# Patient Record
Sex: FEMALE | Race: WHITE | NOT HISPANIC OR LATINO | Employment: OTHER | URBAN - METROPOLITAN AREA
[De-identification: names, ages, dates, MRNs, and addresses within clinical notes are randomized per-mention and may not be internally consistent; named-entity substitution may affect disease eponyms.]

---

## 2017-01-05 ENCOUNTER — OFFICE VISIT (OUTPATIENT)
Dept: INTERNAL MEDICINE | Facility: CLINIC | Age: 82
End: 2017-01-05
Payer: MEDICARE

## 2017-01-05 VITALS
SYSTOLIC BLOOD PRESSURE: 124 MMHG | HEART RATE: 73 BPM | HEIGHT: 62 IN | OXYGEN SATURATION: 98 % | RESPIRATION RATE: 18 BRPM | BODY MASS INDEX: 28.16 KG/M2 | WEIGHT: 153 LBS | TEMPERATURE: 99 F | DIASTOLIC BLOOD PRESSURE: 78 MMHG

## 2017-01-05 DIAGNOSIS — J44.9 CHRONIC OBSTRUCTIVE PULMONARY DISEASE, UNSPECIFIED COPD TYPE: ICD-10-CM

## 2017-01-05 PROCEDURE — 99213 OFFICE O/P EST LOW 20 MIN: CPT | Mod: PBBFAC,PN | Performed by: INTERNAL MEDICINE

## 2017-01-05 PROCEDURE — 99999 PR PBB SHADOW E&M-EST. PATIENT-LVL III: CPT | Mod: PBBFAC,,, | Performed by: INTERNAL MEDICINE

## 2017-01-05 PROCEDURE — 99213 OFFICE O/P EST LOW 20 MIN: CPT | Mod: S$PBB,,, | Performed by: INTERNAL MEDICINE

## 2017-01-05 NOTE — MR AVS SNAPSHOT
[facility]  [address] [place] [address] [address]  [place] [address]  Phone: [phone]                  Carmela Maria   2017 9:00 AM   Office Visit    Description:  Female : 1926   Provider:  Aden Jones MD   Department:  [facility]           Reason for Visit     Follow-up           Diagnoses this Visit        Comments    Chronic obstructive pulmonary disease, unspecified COPD type                To Do List           Future Appointments        Provider Department Dept Phone    2017 10:00 AM [facility] BMD1 [facility] [phone]    2017 10:30 AM Santosh Narayanan MD [facility] Rheumatology [phone]    2017 10:00 AM Aden Jones MD [facility] [phone]    2017 10:00 AM [facility] XR2 [facility] [phone]    2017 10:20 AM PULMONARY LAB, [facility]- Pulmonary Function Svcs [phone]      Goals (5 Years of Data)     None      Follow-Up and Disposition     Return in about 7 months (around 2017), or if symptoms worsen or fail to improve, for Annual Wellness Visit.    Follow-up and Disposition History      [facility]     [facility] -  Assistance  Registered nurses in the [facility] provide clinical advisement, health education, appointment booking, and other advisory services.  Call for this free service at [phone].             Medications           Message regarding Medications     Verify the changes and/or additions to your medication regime listed below are the same as discussed with your clinician today.  If any of these changes or additions are incorrect, please notify your healthcare provider.        STOP taking these medications     albuterol-ipratropium 2.5mg-0.5mg/3mL (DUO-NEB) 0.5 mg-3 mg(2.5 mg base)/3 mL nebulizer solution Take 3 mLs by nebulization every 6 (six) hours while awake.           Verify that the below list of  "medications is an accurate representation of the medications you are currently taking.  If none reported, the list may be blank. If incorrect, please contact your healthcare provider. Carry this list with you in case of emergency.           Current Medications     betamethasone dipropionate (DIPROLENE) 0.05 % cream Apply topically 2 (two) times daily.    betamethasone dipropionate (DIPROLENE) 0.05 % cream Apply topically 2 (two) times daily.    budesonide (PULMICORT) 0.5 mg/2 mL nebulizer solution Take 2 mLs (0.5 mg total) by nebulization 2 (two) times daily.    calcium carbonate (OS-KASIA) 600 mg (1,500 mg) Tab Take 600 mg by mouth once daily.    cholecalciferol, vitamin D3, (VITAMIN D3) 400 unit Chew Take by mouth. Take  tues - thurs - sat    dasatinib (SPRYCEL) 50 MG tablet Take 1 tablet (50 mg total) by mouth every other day.    docusate sodium (COLACE) 100 MG capsule Take by mouth 2 (two) times daily.    furosemide (LASIX) 20 MG tablet Take 1 tablet (20 mg total) by mouth 2 (two) times daily as needed (leg swelling).    metoprolol succinate (TOPROL-XL) 25 MG 24 hr tablet Take 0.5 tablets (12.5 mg total) by mouth every evening.    ranitidine (ZANTAC) 150 MG tablet Take 150 mg by mouth 2 (two) times daily.    ranolazine (RANEXA) 500 MG Tb12 Take 1 tablet (500 mg total) by mouth 2 (two) times daily.    trazodone (DESYREL) 50 MG tablet Take 0.5 tablets (25 mg total) by mouth every evening.           Clinical Reference Information           Vital Signs - Last Recorded  Most recent update: 1/5/2017  9:07 AM by Mag Yu MA    BP Pulse Temp Resp Ht Wt    124/78 (BP Location: Right arm, Patient Position: Sitting, BP Method: Manual) 73 98.6 °F (37 °C) (Tympanic) 18 5' 2" (1.575 m) 69.4 kg (153 lb)    SpO2 BMI             98% 27.98 kg/m2         Blood Pressure          Most Recent Value    BP  124/78      Allergies as of 1/5/2017     Aspirin    Asmanex Twisthaler  [Mometasone]    Brovana  [Arformoterol]    Butisol  " [Butabarbital]    Codeine    Diazepam    Iodine    Limbitrol  [Amitriptyline-chlordiazepoxide]    Medrol  [Methylprednisolone]    Sulfa (Sulfonamide Antibiotics)      Immunizations Administered on Date of Encounter - 1/5/2017     None

## 2017-01-05 NOTE — PROGRESS NOTES
Subjective:      Patient ID: Carmela Maria is a 89 y.o. female.    Chief Complaint: Follow-up    HPI  Ms. Maria is a patient of mine, who presents for FU on leg swelling. She reports resolution of her bilateral leg swelling following taking lasix 20 mg on 2 episodes. No sob. No cp.     She reports she stopped taking her DUO-NEB 1 month ago due making her feel nervous, fatigue and sob.    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 Unruly Ball    CHF (congestive heart failure)     Clotting disorder     COPD (chronic obstructive pulmonary disease)     Coronary artery disease     CVA (cerebral infarction) 2013    Depression     Fall 2013    Heart murmur     Hyperlipidemia     Osteoporosis     Palpitation 2015    Sleep apnea     Traumatic brain injury 2013    Ulcer      Past Surgical History   Procedure Laterality Date    Appendectomy       section      Tubal ligation       Social History     Social History    Marital status:      Spouse name: N/A    Number of children: N/A    Years of education: N/A     Occupational History    Not on file.     Social History Main Topics    Smoking status: Never Smoker    Smokeless tobacco: Never Used    Alcohol use No    Drug use: No    Sexual activity: No     Other Topics Concern    Not on file     Social History Narrative     Family History   Problem Relation Age of Onset    COPD Mother     Heart disease Father     Cancer Sister     COPD Sister     COPD Brother        Current Outpatient Prescriptions:     betamethasone dipropionate (DIPROLENE) 0.05 % cream, Apply topically 2 (two) times daily., Disp: 60 g, Rfl: 1    betamethasone dipropionate (DIPROLENE) 0.05 % cream, Apply topically 2 (two) times daily., Disp: 60 g, Rfl: 1    budesonide (PULMICORT) 0.5 mg/2 mL nebulizer  solution, Take 2 mLs (0.5 mg total) by nebulization 2 (two) times daily., Disp: 360 mL, Rfl: 3    calcium carbonate (OS-KASIA) 600 mg (1,500 mg) Tab, Take 600 mg by mouth once daily., Disp: , Rfl:     cholecalciferol, vitamin D3, (VITAMIN D3) 400 unit Chew, Take by mouth. Take  tues - thurs - sat, Disp: , Rfl:     dasatinib (SPRYCEL) 50 MG tablet, Take 1 tablet (50 mg total) by mouth every other day., Disp: , Rfl:     docusate sodium (COLACE) 100 MG capsule, Take by mouth 2 (two) times daily., Disp: , Rfl:     furosemide (LASIX) 20 MG tablet, Take 1 tablet (20 mg total) by mouth 2 (two) times daily as needed (leg swelling)., Disp: 60 tablet, Rfl: 11    metoprolol succinate (TOPROL-XL) 25 MG 24 hr tablet, Take 0.5 tablets (12.5 mg total) by mouth every evening., Disp: 30 tablet, Rfl: 11    ranitidine (ZANTAC) 150 MG tablet, Take 150 mg by mouth 2 (two) times daily., Disp: , Rfl:     ranolazine (RANEXA) 500 MG Tb12, Take 1 tablet (500 mg total) by mouth 2 (two) times daily., Disp: 180 tablet, Rfl: 2    trazodone (DESYREL) 50 MG tablet, Take 0.5 tablets (25 mg total) by mouth every evening., Disp: 15 tablet, Rfl: 11    Review of patient's allergies indicates:   Allergen Reactions    Aspirin      Other reaction(s): Unknown    Asmanex twisthaler  [mometasone]      Other reaction(s): Rhinitis  Other reaction(s): Headache  Other reaction(s): Eye irritation    Brovana  [arformoterol] Nausea Only     Other reaction(s): Headache  Other reaction(s): Dry Nose    Butisol  [butabarbital]      Other reaction(s): Unknown    Codeine      Other reaction(s): Unknown    Diazepam      Other reaction(s): Unknown    Iodine      Other reaction(s): Unknown    Limbitrol  [amitriptyline-chlordiazepoxide]      Other reaction(s): Unknown  Other reaction(s): Unknown    Medrol  [methylprednisolone]      Other reaction(s): Shortness of breath  Other reaction(s): Shortness of breath    Sulfa (sulfonamide antibiotics)      Other  "reaction(s): Unknown     Lab Results   Component Value Date    WBC 5.63 09/09/2015    HGB 11.6 (L) 09/09/2015    HCT 34.7 (L) 09/09/2015     (L) 09/09/2015    CHOL 169 11/10/2015    TRIG 119 11/10/2015    HDL 55 11/10/2015    ALT 8 (L) 09/21/2016    AST 18 09/21/2016     09/21/2016     09/21/2016    K 4.9 09/21/2016    K 4.9 09/21/2016     09/21/2016     09/21/2016    CREATININE 0.9 09/21/2016    CREATININE 0.9 09/21/2016    BUN 23 09/21/2016    BUN 23 09/21/2016    CO2 34 (H) 09/21/2016    CO2 34 (H) 09/21/2016    TSH 1.771 12/08/2016    INR 1.0 06/07/2013    GLUF 103 05/03/2006     Visit Vitals    /78 (BP Location: Right arm, Patient Position: Sitting, BP Method: Manual)    Pulse 73    Temp 98.6 °F (37 °C) (Tympanic)    Resp 18    Ht 5' 2" (1.575 m)    Wt 69.4 kg (153 lb)    SpO2 98%    BMI 27.98 kg/m2     Review of Systems   Negative    Objective:     Physical Exam  GEN: A&O x3, NAD  PSYC: Normal affect  EXT: No C/C/E    No results found for: HGBA1C    Assessment/Plan:   1. Peripheral edema: Resolved.  2. SOB: Resolved.     RTC in 7 months for annual wellness visit or sooner as needed  "

## 2017-02-04 DIAGNOSIS — R00.2 PALPITATION: ICD-10-CM

## 2017-02-04 DIAGNOSIS — I35.0 NONRHEUMATIC AORTIC VALVE STENOSIS: ICD-10-CM

## 2017-02-04 RX ORDER — METOPROLOL SUCCINATE 25 MG/1
TABLET, EXTENDED RELEASE ORAL
Qty: 30 TABLET | Refills: 10 | Status: SHIPPED | OUTPATIENT
Start: 2017-02-04 | End: 2017-03-30 | Stop reason: SDUPTHER

## 2017-03-06 ENCOUNTER — TELEPHONE (OUTPATIENT)
Dept: RHEUMATOLOGY | Facility: HOSPITAL | Age: 82
End: 2017-03-06

## 2017-03-06 DIAGNOSIS — M81.0 OSTEOPOROSIS: Primary | ICD-10-CM

## 2017-03-06 RX ORDER — ZOLEDRONIC ACID 5 MG/100ML
5 INJECTION, SOLUTION INTRAVENOUS ONCE
Status: CANCELLED | OUTPATIENT
Start: 2017-03-06 | End: 2017-03-06

## 2017-03-06 RX ORDER — SODIUM CHLORIDE 0.9 % (FLUSH) 0.9 %
10 SYRINGE (ML) INJECTION
Status: CANCELLED | OUTPATIENT
Start: 2017-03-06

## 2017-03-21 DIAGNOSIS — M85.80 OSTEOPENIA OF THE ELDERLY: Primary | ICD-10-CM

## 2017-03-21 RX ORDER — SODIUM CHLORIDE 0.9 % (FLUSH) 0.9 %
10 SYRINGE (ML) INJECTION
Status: CANCELLED | OUTPATIENT
Start: 2017-03-21

## 2017-03-21 RX ORDER — ZOLEDRONIC ACID 5 MG/100ML
5 INJECTION, SOLUTION INTRAVENOUS ONCE
Status: CANCELLED | OUTPATIENT
Start: 2017-03-21 | End: 2017-03-21

## 2017-03-30 DIAGNOSIS — R00.2 PALPITATION: ICD-10-CM

## 2017-03-30 DIAGNOSIS — I35.0 NONRHEUMATIC AORTIC VALVE STENOSIS: ICD-10-CM

## 2017-03-30 DIAGNOSIS — J44.9 CHRONIC OBSTRUCTIVE PULMONARY DISEASE, UNSPECIFIED COPD TYPE: ICD-10-CM

## 2017-03-30 DIAGNOSIS — R00.2 PALPITATION: Primary | ICD-10-CM

## 2017-03-30 RX ORDER — BUDESONIDE 0.5 MG/2ML
0.5 INHALANT ORAL 2 TIMES DAILY
Qty: 360 ML | Refills: 3 | Status: SHIPPED | OUTPATIENT
Start: 2017-03-30 | End: 2017-08-17 | Stop reason: SDUPTHER

## 2017-03-30 RX ORDER — BUDESONIDE 0.5 MG/2ML
0.5 INHALANT ORAL 2 TIMES DAILY
Qty: 360 ML | Refills: 3 | Status: SHIPPED | OUTPATIENT
Start: 2017-03-30 | End: 2017-03-30

## 2017-03-30 RX ORDER — RANOLAZINE 500 MG/1
500 TABLET, EXTENDED RELEASE ORAL 2 TIMES DAILY
Qty: 180 TABLET | Refills: 2 | Status: SHIPPED | OUTPATIENT
Start: 2017-03-30 | End: 2018-04-12 | Stop reason: SDUPTHER

## 2017-03-30 RX ORDER — METOPROLOL SUCCINATE 25 MG/1
25 TABLET, EXTENDED RELEASE ORAL DAILY
Qty: 30 TABLET | Refills: 6 | Status: SHIPPED | OUTPATIENT
Start: 2017-03-30 | End: 2018-04-17 | Stop reason: SINTOL

## 2017-03-30 RX ORDER — METOPROLOL SUCCINATE 25 MG/1
TABLET, EXTENDED RELEASE ORAL
Qty: 30 TABLET | Refills: 10 | Status: SHIPPED | OUTPATIENT
Start: 2017-03-30 | End: 2017-08-07

## 2017-03-30 NOTE — TELEPHONE ENCOUNTER
Pt last seen on 10/25/16 , last seen by Aury 11/16/16, last seen by Karen 01/05/17 and Cruz 12/08/16.

## 2017-04-24 ENCOUNTER — INFUSION (OUTPATIENT)
Dept: RHEUMATOLOGY | Facility: HOSPITAL | Age: 82
End: 2017-04-24
Attending: INTERNAL MEDICINE
Payer: MEDICARE

## 2017-04-24 ENCOUNTER — APPOINTMENT (OUTPATIENT)
Dept: RADIOLOGY | Facility: CLINIC | Age: 82
End: 2017-04-24
Attending: INTERNAL MEDICINE
Payer: MEDICARE

## 2017-04-24 ENCOUNTER — OFFICE VISIT (OUTPATIENT)
Dept: RHEUMATOLOGY | Facility: CLINIC | Age: 82
End: 2017-04-24
Payer: MEDICARE

## 2017-04-24 VITALS
WEIGHT: 149.5 LBS | BODY MASS INDEX: 27.51 KG/M2 | SYSTOLIC BLOOD PRESSURE: 111 MMHG | RESPIRATION RATE: 18 BRPM | HEART RATE: 61 BPM | HEIGHT: 62 IN | DIASTOLIC BLOOD PRESSURE: 70 MMHG

## 2017-04-24 VITALS
SYSTOLIC BLOOD PRESSURE: 106 MMHG | WEIGHT: 149.5 LBS | HEART RATE: 58 BPM | BODY MASS INDEX: 27.51 KG/M2 | HEIGHT: 62 IN | DIASTOLIC BLOOD PRESSURE: 54 MMHG

## 2017-04-24 DIAGNOSIS — M85.80 OSTEOPENIA OF THE ELDERLY: Primary | ICD-10-CM

## 2017-04-24 DIAGNOSIS — M85.80 OSTEOPENIA OF THE ELDERLY: Chronic | ICD-10-CM

## 2017-04-24 DIAGNOSIS — Z51.81 MEDICATION MONITORING ENCOUNTER: Primary | ICD-10-CM

## 2017-04-24 DIAGNOSIS — M81.0 AGE-RELATED OSTEOPOROSIS WITHOUT CURRENT PATHOLOGICAL FRACTURE: ICD-10-CM

## 2017-04-24 PROCEDURE — 99213 OFFICE O/P EST LOW 20 MIN: CPT | Mod: PBBFAC,PO | Performed by: INTERNAL MEDICINE

## 2017-04-24 PROCEDURE — 99213 OFFICE O/P EST LOW 20 MIN: CPT | Mod: S$PBB,,, | Performed by: INTERNAL MEDICINE

## 2017-04-24 PROCEDURE — 77080 DXA BONE DENSITY AXIAL: CPT | Mod: 26,,, | Performed by: INTERNAL MEDICINE

## 2017-04-24 PROCEDURE — 99999 PR PBB SHADOW E&M-EST. PATIENT-LVL III: CPT | Mod: PBBFAC,,, | Performed by: INTERNAL MEDICINE

## 2017-04-24 RX ORDER — ZOLEDRONIC ACID 5 MG/100ML
5 INJECTION, SOLUTION INTRAVENOUS ONCE
Status: COMPLETED | OUTPATIENT
Start: 2017-04-24 | End: 2017-04-24

## 2017-04-24 RX ORDER — SODIUM CHLORIDE 0.9 % (FLUSH) 0.9 %
10 SYRINGE (ML) INJECTION
Status: CANCELLED | OUTPATIENT
Start: 2017-04-24

## 2017-04-24 RX ORDER — SODIUM CHLORIDE 0.9 % (FLUSH) 0.9 %
10 SYRINGE (ML) INJECTION
Status: DISCONTINUED | OUTPATIENT
Start: 2017-04-24 | End: 2017-04-24 | Stop reason: HOSPADM

## 2017-04-24 RX ORDER — ZOLEDRONIC ACID 5 MG/100ML
5 INJECTION, SOLUTION INTRAVENOUS ONCE
Status: CANCELLED | OUTPATIENT
Start: 2017-04-24 | End: 2017-04-24

## 2017-04-24 RX ADMIN — Medication 10 ML: at 12:04

## 2017-04-24 RX ADMIN — ZOLEDRONIC ACID 5 MG: 5 INJECTION, SOLUTION INTRAVENOUS at 12:04

## 2017-04-24 NOTE — MR AVS SNAPSHOT
[facility]  [address] [place] [address]  Phone: [phone]  Fax: [phone]                  Carmela Maria   2017 11:00 AM   Infusion    Description:  Female : 1926   Provider:  RHEUMATOLOGY, INFUSION   Department:  [facility]           Diagnoses this Visit        Comments    Osteopenia of the elderly    -  Primary            To Do List           Future Appointments        Provider Department Dept Phone    2017 10:00 AM Aden Jones MD [place] - Internal Medicine [phone]    2017 10:00 AM [facility] XR2 [facility] [phone]    2017 10:20 AM PULMONARY LAB, [facility]- Pulmonary Function Svcs [phone]    2017 10:40 AM Gigi Arroyo MD [facility] - Pulmonary Services [phone]      Goals (5 Years of Data)     None      [facility]     [facility] -  Assistance  Unless otherwise directed by your provider, please contact [facility], our nurse care line that is available for  assistance.     Registered nurses in the [facility] provide: appointment scheduling, clinical advisement, health education, and other advisory services.  Call: [phone] (toll free)               Medications           Message regarding Medications     Verify the changes and/or additions to your medication regime listed below are the same as discussed with your clinician today.  If any of these changes or additions are incorrect, please notify your healthcare provider.        These medications were administered today        Dose Freq    zoledronic acid-mannitol & water 5 mg/100 mL infusion 5 mg 5 mg Once    Sig: Inject 100 mLs (5 mg total) into the vein once.    Class: Normal    Route: Intravenous    Non-formulary Exception Code: Specific indication for non-formulary alternative    sodium chloride 0.9% flush 10 mL 10 mL As needed (PRN)    Sig: Inject 10 mLs into the vein as  "needed for Line Care.    Class: Normal    Route: Intravenous    Non-formulary Exception Code: Specific indication for non-formulary alternative           Verify that the below list of medications is an accurate representation of the medications you are currently taking.  If none reported, the list may be blank. If incorrect, please contact your healthcare provider. Carry this list with you in case of emergency.           Current Medications     betamethasone dipropionate (DIPROLENE) 0.05 % cream Apply topically 2 (two) times daily.    budesonide (PULMICORT) 0.5 mg/2 mL nebulizer solution Take 2 mLs (0.5 mg total) by nebulization 2 (two) times daily.    calcium carbonate (OS-KASIA) 600 mg (1,500 mg) Tab Take 600 mg by mouth once daily.    cholecalciferol, vitamin D3, (VITAMIN D3) 400 unit Chew Take by mouth. Take  tues - thurs - sat    dasatinib (SPRYCEL) 50 MG tablet Take 1 tablet (50 mg total) by mouth every other day.    docusate sodium (COLACE) 100 MG capsule Take by mouth 2 (two) times daily.    furosemide (LASIX) 20 MG tablet Take 1 tablet (20 mg total) by mouth 2 (two) times daily as needed (leg swelling).    metoprolol succinate (TOPROL XL) 25 MG 24 hr tablet TAKE ONE-HALF TABLET (12.5 MG) EVERY EVENING    metoprolol succinate (TOPROL XL) 25 MG 24 hr tablet Take 1 tablet (25 mg total) by mouth once daily.    ranitidine (ZANTAC) 150 MG capsule Take 1 capsule (150 mg total) by mouth 2 (two) times daily.    ranolazine (RANEXA) 500 MG Tb12 Take 1 tablet (500 mg total) by mouth 2 (two) times daily.    trazodone (DESYREL) 50 MG tablet Take 0.5 tablets (25 mg total) by mouth every evening.           Clinical Reference Information           Your Vitals Were     Height Weight BMI          5' 2" (1.575 m) 67.8 kg (149 lb 7.6 oz) 27.34 kg/m2        Allergies as of 4/24/2017     Aspirin    Asmanex Twisthaler  [Mometasone]    Brovana  [Arformoterol]    Butisol  [Butabarbital]    Codeine    Diazepam    Iodine    Limbitrol  " [Amitriptyline-chlordiazepoxide]    Medrol  [Methylprednisolone]    Sulfa (Sulfonamide Antibiotics)      Immunizations Administered on Date of Encounter - 4/24/2017     None      Orders Placed During Today's Visit      Normal Orders This Visit    Medication Pre-Authorization       Administrations This Visit     sodium chloride 0.9% flush 10 mL     Admin Date Action Dose Route Administered By             04/24/2017 Given 10 mL Intravenous Rafita Logan RN                    zoledronic acid-mannitol & water 5 mg/100 mL infusion 5 mg     Admin Date Action Dose Rate Route Administered By          04/24/2017 New Bag 5 mg 300 mL/hr Intravenous Rafita Logan RN                     Instructions      Zoledronic Acid injection (Paget's Disease, Osteoporosis)  What is this medicine?  ZOLEDRONIC ACID (MAIKOL [place] AS id) lowers the amount of calcium loss from bone. It is used to treat Paget's disease and osteoporosis in women.  How should I use this medicine?  This medicine is for infusion into a vein. It is given by a health care professional in a hospital or clinic setting.  Talk to your pediatrician regarding the use of this medicine in children. This medicine is not approved for use in children.  What side effects may I notice from receiving this medicine?  Side effects that you should report to your doctor or health care professional as soon as possible:  · allergic reactions like skin rash, itching or hives, swelling of the face, lips, or tongue  · anxiety, confusion, or depression  · breathing problems  · changes in vision  · eye pain  · feeling faint or lightheaded, falls  · jaw pain, especially after dental work  · mouth sores  · muscle cramps, stiffness, or weakness  · redness, blistering, peeling or loosening of the skin, including inside the mouth  · trouble passing urine or change in the amount of urine  Side effects that usually do not require medical attention (report to your doctor or health care  professional if they continue or are bothersome):  · bone, joint, or muscle pain  · constipation  · diarrhea  · fever  · hair loss  · irritation at site where injected  · loss of appetite  · nausea, vomiting  · stomach upset  · trouble sleeping  · trouble swallowing  · weak or tired  What may interact with this medicine?  · certain antibiotics given by injection  · NSAIDs, medicines for pain and inflammation, like ibuprofen or naproxen  · some diuretics like bumetanide, furosemide  · teriparatide  What if I miss a dose?  It is important not to miss your dose. Call your doctor or health care professional if you are unable to keep an appointment.  Where should I keep my medicine?  This drug is given in a hospital or clinic and will not be stored at home.  What should I tell my health care provider before I take this medicine?  They need to know if you have any of these conditions:  · aspirin-sensitive asthma  · cancer, especially if you are receiving medicines used to treat cancer  · dental disease or wear dentures  · infection  · kidney disease  · low levels of calcium in the blood  · past surgery on the parathyroid gland or intestines  · receiving corticosteroids like dexamethasone or prednisone  · an unusual or allergic reaction to zoledronic acid, other medicines, foods, dyes, or preservatives  · pregnant or trying to get pregnant  · breast-feeding  What should I watch for while using this medicine?  Visit your doctor or health care professional for regular checkups. It may be some time before you see the benefit from this medicine. Do not stop taking your medicine unless your doctor tells you to. Your doctor may order blood tests or other tests to see how you are doing.  Women should inform their doctor if they wish to become pregnant or think they might be pregnant. There is a potential for serious side effects to an unborn child. Talk to your health care professional or pharmacist for more information.  You  should make sure that you get enough calcium and vitamin D while you are taking this medicine. Discuss the foods you eat and the vitamins you take with your health care professional.  Some people who take this medicine have severe bone, joint, and/or muscle pain. This medicine may also increase your risk for jaw problems or a broken thigh bone. Tell your doctor right away if you have severe pain in your jaw, bones, joints, or muscles. Tell your doctor if you have any pain that does not go away or that gets worse.  Tell your dentist and dental surgeon that you are taking this medicine. You should not have major dental surgery while on this medicine. See your dentist to have a dental exam and fix any dental problems before starting this medicine. Take good care of your teeth while on this medicine. Make sure you see your dentist for regular follow-up appointments.  Date Last Reviewed:   NOTE:This sheet is a summary. It may not cover all possible information. If you have questions about this medicine, talk to your doctor, pharmacist, or health care provider. Copyright© 2016 [employer]             Language Assistance Services     ATTENTION: Language assistance services are available, free of charge. Please call [phone].      ATENCIÓN: Si yao chisholm, tiene a diaz disposición servicios gratuitos de asistencia lingüística. Llame al [phone].     GISELA Ý: N?u b?n nói Ti?ng Vi?t, có các d?ch v? h? tr? ngôn ng? mi?n phí dành cho b?n. G?i s? [phone].         [facility] complies with applicable Federal civil rights laws and does not discriminate on the basis of race, color, national origin, age, disability, or sex.

## 2017-04-24 NOTE — PROGRESS NOTES
Subjective:       Patient ID: Blake Garza is a 89 y.o. female.    Chief Complaint: Osteoporosis and Osteoarthritis    HPI   Pt is a 89 yo with CML, hypovitaminosis D, OA, osteoporosis presenting for f/u. Pt was last seen on 9/21/16. She is currently on Reclast 5 mg IV q 12 mo, infusion started on 3/6/2014. Previously Mrs. Garza was on Boniva and had a drug holiday in 2012 but after a Colles fx Reclast was restarted.   Pt is currently on Sprycel for CML and reports doing well. Pt denies any joint pain or swelling. She has minimal activity but reports walking outside when no one is around. No near falls or traumas.    Pt takes Calcium on Tues, Thurs, Saturday. She takes daily vit D 1 g    Review of Systems   Constitutional: Positive for activity change. Negative for appetite change.   Respiratory: Negative for shortness of breath.    Cardiovascular: Negative for palpitations.   Gastrointestinal: Negative for constipation, diarrhea, nausea and vomiting.   Genitourinary: Negative for frequency.   Musculoskeletal: Negative for arthralgias, back pain, joint swelling and myalgias.   Skin: Negative for pallor, rash and wound.   Neurological: Negative for weakness, light-headedness, numbness and headaches.   All other systems reviewed and are negative.        Labs:   Results for BLAKE GARZA (MRN [id number]) as of 4/24/2017 15:36   Ref. Range 4/24/2017 10:11   Sodium Latest Ref Range: 136 - 145 mmol/L 142   Potassium Latest Ref Range: 3.5 - 5.1 mmol/L 4.2   Chloride Latest Ref Range: 95 - 110 mmol/L 103   CO2 Latest Ref Range: 23 - 29 mmol/L 33 (H)   Anion Gap Latest Ref Range: 8 - 16 mmol/L 6 (L)   BUN, Bld Latest Ref Range: 8 - 23 mg/dL 22   Creatinine Latest Ref Range: 0.5 - 1.4 mg/dL 0.9   eGFR if non African American Latest Ref Range: >60 mL/min/1.73 m^2 56 (A)   eGFR if African American Latest Ref Range: >60 mL/min/1.73 m^2 >60   Glucose Latest Ref Range: 70 - 110 mg/dL 85   Calcium Latest Ref Range: 8.7 - 10.5  "mg/dL 9.2   Alkaline Phosphatase Latest Ref Range: 55 - 135 U/L 52 (L)   Total Protein Latest Ref Range: 6.0 - 8.4 g/dL 6.5   Albumin Latest Ref Range: 3.5 - 5.2 g/dL 3.5   Total Bilirubin Latest Ref Range: 0.1 - 1.0 mg/dL 0.5   AST Latest Ref Range: 10 - 40 U/L 19   ALT Latest Ref Range: 10 - 44 U/L 9 (L)       DEXA 4/24/17: Osteoporosis Femur neck -2.5, stable BMD    Objective:     BP (!) 106/54  Pulse (!) 58  Ht 5' 2" (1.575 m)  Wt 67.8 kg (149 lb 7.6 oz)  BMI 27.34 kg/m2     Physical Exam   Constitutional: She is oriented to person, place, and time and well-developed, well-nourished, and in no distress.   HENT:   Head: Normocephalic.   Mouth/Throat: Oropharynx is clear and moist.   Eyes: Conjunctivae and EOM are normal. No scleral icterus.   Neck: Normal range of motion. Neck supple.   Cardiovascular: Normal rate and regular rhythm.    Murmur heard.  Pulmonary/Chest: Effort normal and breath sounds normal.   Abdominal: Soft.   Neurological: She is alert and oriented to person, place, and time.   Slow gait   Skin: Skin is warm and dry. No rash noted. No erythema. No pallor.     Psychiatric: Affect normal.   Musculoskeletal: She exhibits deformity. She exhibits no edema or tenderness.    OA changes to the DIPs and CMCs.  Knees and hips move well.            Assessment:       1. Medication monitoring encounter    2. Age-related osteoporosis without current pathological fracture        89 yo with CML, hypovitaminosis D, OA, osteoporosis presenting for f/u. Labs reviewed, no adverse effects seen with Reclast. Normal Renal function, calcium. Last magnesium level normal  DEXA done today show osteoporosis with stable BMD, due for 4th Reclast infusion today (4/24/17)  No toxicities from Reclast therapy  Plan:   - Okay to receive Reclast infusion today, her BMD is stable after 3 infusions, will plan to continue for 3 more years (6 yrs total). Depending on the DEXA, we may be able to give pt a drug holiday.   - " Continue vitamin D 400 IU and calcium 600 mg   -  RTC in 1 year with repeat CMP, CBC, magnesium, phosphorus level.     Jared Roman Sedrick  PGY-4   I have personally reviewed the history with the patient in the room, examined the patient's joints and pertinent organ systems in the room and developed the above Impressions and Plan. The Imp: and Plan were discussed with the patient and  daughter before leaving the room. BLAYNE Narayanan MD

## 2017-04-24 NOTE — PROGRESS NOTES
Hx reviewed personally  with pt. in room, examined pt.'s joints and pertinent organ areas in room, developed  Imp/Plan as follows and discussed same with patient and daughter.  Continue Reclast for 3 mor yrs as still OP and stable. Fall prevention program given. Discussed hip strengthening program - note Get up and Go time 15-20 sec

## 2017-04-24 NOTE — PROGRESS NOTES
Infusion # 4  Recent Labs? 4/24/17  Recent Invasive or planned dental procedures? Denies.  Reclast 5mg administered IV over 20 minutes. Pt tolerated well without adverse events. Pressure dressing applied.  See Vitals and MAR for further details.  Pt discharged and ambulatory from infusion clinic without any difficulty.

## 2017-04-24 NOTE — PATIENT INSTRUCTIONS
Zoledronic Acid injection (Paget's Disease, Osteoporosis)  What is this medicine?  ZOLEDRONIC ACID (MAIKOL le dron ik AS id) lowers the amount of calcium loss from bone. It is used to treat Paget's disease and osteoporosis in women.  How should I use this medicine?  This medicine is for infusion into a vein. It is given by a health care professional in a hospital or clinic setting.  Talk to your pediatrician regarding the use of this medicine in children. This medicine is not approved for use in children.  What side effects may I notice from receiving this medicine?  Side effects that you should report to your doctor or health care professional as soon as possible:  · allergic reactions like skin rash, itching or hives, swelling of the face, lips, or tongue  · anxiety, confusion, or depression  · breathing problems  · changes in vision  · eye pain  · feeling faint or lightheaded, falls  · jaw pain, especially after dental work  · mouth sores  · muscle cramps, stiffness, or weakness  · redness, blistering, peeling or loosening of the skin, including inside the mouth  · trouble passing urine or change in the amount of urine  Side effects that usually do not require medical attention (report to your doctor or health care professional if they continue or are bothersome):  · bone, joint, or muscle pain  · constipation  · diarrhea  · fever  · hair loss  · irritation at site where injected  · loss of appetite  · nausea, vomiting  · stomach upset  · trouble sleeping  · trouble swallowing  · weak or tired  What may interact with this medicine?  · certain antibiotics given by injection  · NSAIDs, medicines for pain and inflammation, like ibuprofen or naproxen  · some diuretics like bumetanide, furosemide  · teriparatide  What if I miss a dose?  It is important not to miss your dose. Call your doctor or health care professional if you are unable to keep an appointment.  Where should I keep my medicine?  This drug is given in a  hospital or clinic and will not be stored at home.  What should I tell my health care provider before I take this medicine?  They need to know if you have any of these conditions:  · aspirin-sensitive asthma  · cancer, especially if you are receiving medicines used to treat cancer  · dental disease or wear dentures  · infection  · kidney disease  · low levels of calcium in the blood  · past surgery on the parathyroid gland or intestines  · receiving corticosteroids like dexamethasone or prednisone  · an unusual or allergic reaction to zoledronic acid, other medicines, foods, dyes, or preservatives  · pregnant or trying to get pregnant  · breast-feeding  What should I watch for while using this medicine?  Visit your doctor or health care professional for regular checkups. It may be some time before you see the benefit from this medicine. Do not stop taking your medicine unless your doctor tells you to. Your doctor may order blood tests or other tests to see how you are doing.  Women should inform their doctor if they wish to become pregnant or think they might be pregnant. There is a potential for serious side effects to an unborn child. Talk to your health care professional or pharmacist for more information.  You should make sure that you get enough calcium and vitamin D while you are taking this medicine. Discuss the foods you eat and the vitamins you take with your health care professional.  Some people who take this medicine have severe bone, joint, and/or muscle pain. This medicine may also increase your risk for jaw problems or a broken thigh bone. Tell your doctor right away if you have severe pain in your jaw, bones, joints, or muscles. Tell your doctor if you have any pain that does not go away or that gets worse.  Tell your dentist and dental surgeon that you are taking this medicine. You should not have major dental surgery while on this medicine. See your dentist to have a dental exam and fix any dental  problems before starting this medicine. Take good care of your teeth while on this medicine. Make sure you see your dentist for regular follow-up appointments.  Date Last Reviewed:   NOTE:This sheet is a summary. It may not cover all possible information. If you have questions about this medicine, talk to your doctor, pharmacist, or health care provider. Copyright© 2016 [employer]

## 2017-04-24 NOTE — PATIENT INSTRUCTIONS
Fall Prevention  Falls often occur due to slipping, tripping or losing your balance. Millions of people fall every year and injure themselves. Here are ways to reduce your risk of falling again.  · Think about your fall, was there anything that caused your fall that can be fixed, removed, or replaced?  · Make your home safe by keeping walkways clear of objects you may trip over.  · Use non-slip pads under rugs. Do not use area rugs or small throw rugs.  · Use non-slip mats in bathtubs and showers.  · Install handrails and lights on staircases.  · Do not walk in poorly lit areas.  · Do not stand on chairs or wobbly ladders.  · Use caution when reaching overhead or looking upward. This position can cause a loss of balance.  · Be sure your shoes fit properly, have non-slip bottoms and are in good condition.   · Wear shoes both inside and out. Avoid going barefoot or wearing slippers.  · Be cautious when going up and down stairs, curbs, and when walking on uneven sidewalks.  · If your balance is poor, consider using a cane or walker.  · If your fall was related to alcohol use, stop or limit alcohol intake.   · If your fall was related to use of sleeping medicines, talk to your doctor about this. You may need to reduce your dosage at bedtime if you awaken during the night to go to the bathroom.    · To reduce the need for nighttime bathroom trips:  ¨ Avoid drinking fluids for several hours before going to bed  ¨ Empty your bladder before going to bed  ¨ Men can keep a urinal at the bedside  · Stay as active as you can. Balance, flexibility, strength, and endurance all come from exercise. They all play a role in preventing falls. Ask your healthcare provider which types of activity are right for you.  · Get your vision checked on a regular basis.  · If you have pets, know where they are before you stand up or walk so you don't trip over them.  · Use night lights.  Date Last Reviewed: 11/5/2015  © 9382-7815 [employer] [address] [place] [address], [place] [address]. All rights reserved. This information is not intended as a substitute for professional medical care. Always follow your healthcare professional's instructions.

## 2017-05-18 ENCOUNTER — LAB VISIT (OUTPATIENT)
Dept: LAB | Facility: HOSPITAL | Age: 82
End: 2017-05-18
Attending: INTERNAL MEDICINE
Payer: MEDICARE

## 2017-05-18 ENCOUNTER — OFFICE VISIT (OUTPATIENT)
Dept: INTERNAL MEDICINE | Facility: CLINIC | Age: 82
End: 2017-05-18
Payer: MEDICARE

## 2017-05-18 VITALS
DIASTOLIC BLOOD PRESSURE: 58 MMHG | TEMPERATURE: 98 F | HEART RATE: 85 BPM | SYSTOLIC BLOOD PRESSURE: 125 MMHG | WEIGHT: 147.5 LBS | HEIGHT: 59 IN | OXYGEN SATURATION: 96 % | BODY MASS INDEX: 29.73 KG/M2

## 2017-05-18 DIAGNOSIS — I35.0 AORTIC VALVE STENOSIS, UNSPECIFIED ETIOLOGY: ICD-10-CM

## 2017-05-18 DIAGNOSIS — F33.1 DEPRESSION, MAJOR, RECURRENT, MODERATE: ICD-10-CM

## 2017-05-18 DIAGNOSIS — R53.83 FATIGUE, UNSPECIFIED TYPE: ICD-10-CM

## 2017-05-18 DIAGNOSIS — J44.9 CHRONIC OBSTRUCTIVE PULMONARY DISEASE, UNSPECIFIED COPD TYPE: Primary | ICD-10-CM

## 2017-05-18 DIAGNOSIS — I50.20 SYSTOLIC CONGESTIVE HEART FAILURE, UNSPECIFIED CONGESTIVE HEART FAILURE CHRONICITY: ICD-10-CM

## 2017-05-18 DIAGNOSIS — C92.10 CML (CHRONIC MYELOCYTIC LEUKEMIA): ICD-10-CM

## 2017-05-18 DIAGNOSIS — Z86.73 HX OF COMPLETED STROKE: ICD-10-CM

## 2017-05-18 LAB
BASOPHILS # BLD AUTO: 0.02 K/UL
BASOPHILS NFR BLD: 0.3 %
BNP SERPL-MCNC: 378 PG/ML
DIFFERENTIAL METHOD: ABNORMAL
EOSINOPHIL # BLD AUTO: 0.2 K/UL
EOSINOPHIL NFR BLD: 3.4 %
ERYTHROCYTE [DISTWIDTH] IN BLOOD BY AUTOMATED COUNT: 14 %
HCT VFR BLD AUTO: 36.6 %
HGB BLD-MCNC: 11.9 G/DL
LYMPHOCYTES # BLD AUTO: 1.9 K/UL
LYMPHOCYTES NFR BLD: 27.8 %
MCH RBC QN AUTO: 32.2 PG
MCHC RBC AUTO-ENTMCNC: 32.5 %
MCV RBC AUTO: 99 FL
MONOCYTES # BLD AUTO: 0.5 K/UL
MONOCYTES NFR BLD: 7.9 %
NEUTROPHILS # BLD AUTO: 4.1 K/UL
NEUTROPHILS NFR BLD: 60.6 %
PLATELET # BLD AUTO: 141 K/UL
PLATELET BLD QL SMEAR: ABNORMAL
PMV BLD AUTO: 14.1 FL
RBC # BLD AUTO: 3.69 M/UL
WBC # BLD AUTO: 6.69 K/UL

## 2017-05-18 PROCEDURE — 99213 OFFICE O/P EST LOW 20 MIN: CPT | Mod: S$PBB,,, | Performed by: INTERNAL MEDICINE

## 2017-05-18 PROCEDURE — 85025 COMPLETE CBC W/AUTO DIFF WBC: CPT

## 2017-05-18 PROCEDURE — 83880 ASSAY OF NATRIURETIC PEPTIDE: CPT

## 2017-05-18 PROCEDURE — 36415 COLL VENOUS BLD VENIPUNCTURE: CPT | Mod: PO

## 2017-05-18 PROCEDURE — 99999 PR PBB SHADOW E&M-EST. PATIENT-LVL III: CPT | Mod: PBBFAC,,, | Performed by: INTERNAL MEDICINE

## 2017-05-18 RX ORDER — CITALOPRAM 40 MG/1
TABLET, FILM COATED ORAL
Qty: 90 TABLET | Refills: 3 | Status: SHIPPED | OUTPATIENT
Start: 2017-05-18 | End: 2018-06-28

## 2017-05-18 RX ORDER — ALBUTEROL SULFATE 5 MG/ML
2.5 SOLUTION RESPIRATORY (INHALATION) EVERY 6 HOURS PRN
Qty: 1 VIAL | Refills: 11
Start: 2017-05-18 | End: 2017-08-17 | Stop reason: SDUPTHER

## 2017-05-18 NOTE — PROGRESS NOTES
Subjective:      Patient ID: Carmela Maria is a 89 y.o. female.    Chief Complaint: Fatigue      HPI  Ms. Maria is a patient of mine, who presents for fatigue.    She reports stopping her lasix after two doses in 2016 for swollen legs, which resolved the leg swelling and ever since continues to use compression stockings.    She denies having any CP.     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 Unruly Ball    CHF (congestive heart failure)     Clotting disorder     COPD (chronic obstructive pulmonary disease)     Coronary artery disease     CVA (cerebral infarction) 2013    Depression     Fall 2013    Heart murmur     Hyperlipidemia     Osteoporosis     Palpitation 2015    Sleep apnea     Traumatic brain injury 2013    Ulcer      Past Surgical History:   Procedure Laterality Date    APPENDECTOMY       SECTION      TUBAL LIGATION       Social History     Social History    Marital status:      Spouse name: N/A    Number of children: N/A    Years of education: N/A     Occupational History    Not on file.     Social History Main Topics    Smoking status: Never Smoker    Smokeless tobacco: Never Used    Alcohol use No    Drug use: No    Sexual activity: No     Other Topics Concern    Not on file     Social History Narrative     Family History   Problem Relation Age of Onset    COPD Mother     Heart disease Father     Cancer Sister     COPD Sister     COPD Brother        Current Outpatient Prescriptions:     betamethasone dipropionate (DIPROLENE) 0.05 % cream, Apply topically 2 (two) times daily., Disp: 60 g, Rfl: 1    budesonide (PULMICORT) 0.5 mg/2 mL nebulizer solution, Take 2 mLs (0.5 mg total) by nebulization 2 (two) times daily., Disp: 360 mL, Rfl: 3    calcium carbonate (OS-KASIA) 600 mg  (1,500 mg) Tab, Take 600 mg by mouth once daily., Disp: , Rfl:     cholecalciferol, vitamin D3, (VITAMIN D3) 400 unit Chew, Take by mouth. Take  tues - thurs - sat, Disp: , Rfl:     citalopram (CELEXA) 40 MG tablet, TAKE 1 TABLET DAILY, Disp: 90 tablet, Rfl: 3    dasatinib (SPRYCEL) 50 MG tablet, Take 1 tablet (50 mg total) by mouth every other day., Disp: , Rfl:     docusate sodium (COLACE) 100 MG capsule, Take by mouth 2 (two) times daily., Disp: , Rfl:     metoprolol succinate (TOPROL XL) 25 MG 24 hr tablet, TAKE ONE-HALF TABLET (12.5 MG) EVERY EVENING, Disp: 30 tablet, Rfl: 10    metoprolol succinate (TOPROL XL) 25 MG 24 hr tablet, Take 1 tablet (25 mg total) by mouth once daily., Disp: 30 tablet, Rfl: 6    ranitidine (ZANTAC) 150 MG capsule, Take 1 capsule (150 mg total) by mouth 2 (two) times daily., Disp: 180 capsule, Rfl: 10    ranolazine (RANEXA) 500 MG Tb12, Take 1 tablet (500 mg total) by mouth 2 (two) times daily., Disp: 180 tablet, Rfl: 2    trazodone (DESYREL) 50 MG tablet, Take 0.5 tablets (25 mg total) by mouth every evening., Disp: 15 tablet, Rfl: 11    albuterol (PROVENTIL) 5 mg/mL nebulizer solution, Take 0.5 mLs (2.5 mg total) by nebulization every 6 (six) hours as needed for Wheezing., Disp: 1 vial, Rfl: 11    Review of patient's allergies indicates:   Allergen Reactions    Aspirin      Other reaction(s): Unknown    Asmanex twisthaler  [mometasone]      Other reaction(s): Rhinitis  Other reaction(s): Headache  Other reaction(s): Eye irritation    Brovana  [arformoterol] Nausea Only     Other reaction(s): Headache  Other reaction(s): Dry Nose    Butisol  [butabarbital]      Other reaction(s): Unknown    Codeine      Other reaction(s): Unknown    Diazepam      Other reaction(s): Unknown    Iodine      Other reaction(s): Unknown    Limbitrol  [amitriptyline-chlordiazepoxide]      Other reaction(s): Unknown  Other reaction(s): Unknown    Medrol  [methylprednisolone]      Other  "reaction(s): Shortness of breath  Other reaction(s): Shortness of breath    Sulfa (sulfonamide antibiotics)      Other reaction(s): Unknown     Review of Systems   Constitutional: Negative.   Cardiovascular: Negative.   Respiratory: Negative, except for her usual CRYSTAL, which is much improved by budsonide.   Gastrointestinal: Negative.   Genitourinary: Negative.   Musculoskeletal: Negative.   Derm: Negative.  Allergic/Immunologic: Negative.   Endocrine: Negative.   Hematological: Negative.   Neurological: Negative.   Psychiatric/Behavioral: Negative.     Objective:     BP (!) 125/58 (BP Location: Right arm, Patient Position: Sitting, BP Method: Manual)  Pulse 85  Temp 98.1 °F (36.7 °C) (Tympanic)   Ht 4' 11" (1.499 m)  Wt 66.9 kg (147 lb 7.8 oz)  SpO2 96%  BMI 29.79 kg/m2    Physical Exam  GEN: A&O fully, NAD; ambulates with walker  PSYC: Normal affect  CV: RRR, +EDUARDO best at apex  PULM: CTA bilaterally, no wheezes, rales  EXT: No C/C/E, stockings on legs  NEURO: 5/5 strength globally, no sensory losses    Lab Results   Component Value Date    WBC 5.63 09/09/2015    HGB 11.6 (L) 09/09/2015    HCT 34.7 (L) 09/09/2015     (L) 09/09/2015    CHOL 169 11/10/2015    TRIG 119 11/10/2015    HDL 55 11/10/2015    ALT 9 (L) 04/24/2017    AST 19 04/24/2017     04/24/2017    K 4.2 04/24/2017     04/24/2017    CREATININE 0.9 04/24/2017    BUN 22 04/24/2017    CO2 33 (H) 04/24/2017    TSH 1.771 12/08/2016    INR 1.0 06/07/2013    GLUF 103 05/03/2006       Assessment:      1. Chronic obstructive pulmonary disease, unspecified COPD type: Stable.    2. Systolic congestive heart failure, unspecified congestive heart failure chronicity: Stable.     3. Depression, major, recurrent, moderate: Stable.    4. CML (chronic myelocytic leukemia): Stable.   5. Hx of completed stroke: Stable.    6. Aortic valve stenosis, unspecified etiology: Stable.   7.      Fatigue: May be related to any of the above. Will check CBC and " BNP to r/o worsening anemia and CHF.     Plan:   Chronic obstructive pulmonary disease, unspecified COPD type  -     albuterol (PROVENTIL) 5 mg/mL nebulizer solution; Take 0.5 mLs (2.5 mg total) by nebulization every 6 (six) hours as needed for Wheezing.  Dispense: 1 vial; Refill: 11    Systolic congestive heart failure, unspecified congestive heart failure chronicity  -     Brain natriuretic peptide; Future; Expected date: 5/18/17    Depression, major, recurrent, moderate    CML (chronic myelocytic leukemia)  -     CBC auto differential; Future; Expected date: 5/18/17    Hx of completed stroke    Aortic valve stenosis, unspecified etiology    Fatigue, unspecified type  -     CBC auto differential; Future; Expected date: 5/18/17  -     Brain natriuretic peptide; Future; Expected date: 5/18/17      RTC already made for July, 2017

## 2017-05-18 NOTE — MR AVS SNAPSHOT
[facility]  [address] [place] [id number]  Phone: [phone]                  Carmela Maria   2017 1:20 PM   Office Visit    Description:  Female : 1926   Provider:  Aden Jones MD   Department:  [facility]           Reason for Visit     Fatigue           Diagnoses this Visit        Comments    Chronic obstructive pulmonary disease, unspecified COPD type    -  Primary     Systolic congestive heart failure, unspecified congestive heart failure chronicity         Depression, major, recurrent, moderate         CML (chronic myelocytic leukemia)         Hx of completed stroke         Aortic valve stenosis, unspecified etiology         Fatigue, unspecified type                To Do List           Future Appointments        Provider Department Dept Phone    2017 4:50 PM LABORATORY, [facility]     2017 10:00 AM Aden Jones MD [facility] [phone]    2017 10:00 AM SUM XR2 [facility] [phone]    2017 10:20 AM PULMONARY LAB, [facility] [phone]    2017 10:40 AM Gigi Arroyo MD [facility] [phone]      Goals (5 Years of Data)     None      Follow-Up and Disposition     Return if symptoms worsen or fail to improve.    Follow-up and Disposition History       These Medications        Disp Refills Start End    albuterol (PROVENTIL) 5 mg/mL nebulizer solution 1 vial 11 2017     Take 0.5 mLs (2.5 mg total) by nebulization every 6 (six) hours as needed for Wheezing. - Nebulization    Pharmacy: [facility] - [facility], [place] [address] #: [phone]         [facility]     [facility] -  Assistance  Unless otherwise directed by your provider, please contact [facility], our nurse care line that is available for  assistance.      Registered nurses in the [facility] provide: appointment scheduling, clinical advisement, health education, and other advisory services.  Call: [phone] (toll free)               Medications           Message regarding Medications     Verify the changes and/or additions to your medication regime listed below are the same as discussed with your clinician today.  If any of these changes or additions are incorrect, please notify your healthcare provider.        START taking these NEW medications        Refills    albuterol (PROVENTIL) 5 mg/mL nebulizer solution 11    Sig: Take 0.5 mLs (2.5 mg total) by nebulization every 6 (six) hours as needed for Wheezing.    Class: No Print    Route: Nebulization      STOP taking these medications     furosemide (LASIX) 20 MG tablet Take 1 tablet (20 mg total) by mouth 2 (two) times daily as needed (leg swelling).           Verify that the below list of medications is an accurate representation of the medications you are currently taking.  If none reported, the list may be blank. If incorrect, please contact your healthcare provider. Carry this list with you in case of emergency.           Current Medications     citalopram (CELEXA) 40 MG tablet TAKE 1 TABLET DAILY    albuterol (PROVENTIL) 5 mg/mL nebulizer solution Take 0.5 mLs (2.5 mg total) by nebulization every 6 (six) hours as needed for Wheezing.    betamethasone dipropionate (DIPROLENE) 0.05 % cream Apply topically 2 (two) times daily.    budesonide (PULMICORT) 0.5 mg/2 mL nebulizer solution Take 2 mLs (0.5 mg total) by nebulization 2 (two) times daily.    calcium carbonate (OS-KASIA) 600 mg (1,500 mg) Tab Take 600 mg by mouth once daily.    cholecalciferol, vitamin D3, (VITAMIN D3) 400 unit Chew Take by mouth. Take  tues - thurs - sat    dasatinib (SPRYCEL) 50 MG tablet Take 1 tablet (50 mg total) by mouth every other day.    docusate sodium (COLACE) 100 MG capsule Take by mouth 2 (two) times daily.     "metoprolol succinate (TOPROL XL) 25 MG 24 hr tablet TAKE ONE-HALF TABLET (12.5 MG) EVERY EVENING    metoprolol succinate (TOPROL XL) 25 MG 24 hr tablet Take 1 tablet (25 mg total) by mouth once daily.    ranitidine (ZANTAC) 150 MG capsule Take 1 capsule (150 mg total) by mouth 2 (two) times daily.    ranolazine (RANEXA) 500 MG Tb12 Take 1 tablet (500 mg total) by mouth 2 (two) times daily.    trazodone (DESYREL) 50 MG tablet Take 0.5 tablets (25 mg total) by mouth every evening.           Clinical Reference Information           Your Vitals Were     BP Pulse Temp Height    125/58 (BP Location: Right arm, Patient Position: Sitting, BP Method: Manual) 85 98.1 °F (36.7 °C) (Tympanic) 4' 11" (1.499 m)    Weight SpO2 BMI    66.9 kg (147 lb 7.8 oz) 96% 29.79 kg/m2      Blood Pressure          Most Recent Value    BP  (!)  125/58      Allergies as of 5/18/2017     Aspirin    Asmanex Twisthaler  [Mometasone]    Brovana  [Arformoterol]    Butisol  [Butabarbital]    Codeine    Diazepam    Iodine    Limbitrol  [Amitriptyline-chlordiazepoxide]    Medrol  [Methylprednisolone]    Sulfa (Sulfonamide Antibiotics)      Immunizations Administered on Date of Encounter - 5/18/2017     None      Orders Placed During Today's Visit     Future Labs/Procedures Expected by Expires    Brain natriuretic peptide  5/18/2017 7/17/2018    CBC auto differential  5/18/2017 7/17/2018      Language Assistance Services     ATTENTION: Language assistance services are available, free of charge. Please call [phone].      ATENCIÓN: Si habla español, tiene a diaz disposición servicios gratuitos de asistencia lingüística. Llame al [phone].     CHÚ Ý: N?u b?n nói Ti?ng Vi?t, có các d?ch v? h? tr? ngôn ng? mi?n phí dành cho b?n. G?i s? [phone].         [facility] complies with applicable Federal civil rights laws and does not discriminate on the basis of race, color, national origin, age, disability, or sex.        "

## 2017-07-05 ENCOUNTER — APPOINTMENT (OUTPATIENT)
Dept: RADIOLOGY | Facility: HOSPITAL | Age: 82
End: 2017-07-05
Attending: INTERNAL MEDICINE
Payer: MEDICARE

## 2017-07-05 ENCOUNTER — OFFICE VISIT (OUTPATIENT)
Dept: INTERNAL MEDICINE | Facility: CLINIC | Age: 82
End: 2017-07-05
Payer: MEDICARE

## 2017-07-05 VITALS
WEIGHT: 150.38 LBS | OXYGEN SATURATION: 98 % | RESPIRATION RATE: 16 BRPM | HEIGHT: 59 IN | TEMPERATURE: 98 F | DIASTOLIC BLOOD PRESSURE: 62 MMHG | BODY MASS INDEX: 30.32 KG/M2 | HEART RATE: 82 BPM | SYSTOLIC BLOOD PRESSURE: 133 MMHG

## 2017-07-05 DIAGNOSIS — J44.9 CHRONIC OBSTRUCTIVE PULMONARY DISEASE, UNSPECIFIED COPD TYPE: Primary | ICD-10-CM

## 2017-07-05 DIAGNOSIS — R05.9 COUGH: ICD-10-CM

## 2017-07-05 DIAGNOSIS — R53.83 FATIGUE, UNSPECIFIED TYPE: ICD-10-CM

## 2017-07-05 DIAGNOSIS — C92.10 CML (CHRONIC MYELOCYTIC LEUKEMIA): ICD-10-CM

## 2017-07-05 DIAGNOSIS — J42 CHRONIC BRONCHITIS, UNSPECIFIED CHRONIC BRONCHITIS TYPE: ICD-10-CM

## 2017-07-05 DIAGNOSIS — I35.0 AORTIC VALVE STENOSIS, UNSPECIFIED ETIOLOGY: ICD-10-CM

## 2017-07-05 DIAGNOSIS — I50.20 SYSTOLIC CONGESTIVE HEART FAILURE, UNSPECIFIED CONGESTIVE HEART FAILURE CHRONICITY: ICD-10-CM

## 2017-07-05 PROCEDURE — 71020 XR CHEST PA AND LATERAL: CPT | Mod: TC,PO

## 2017-07-05 PROCEDURE — 1159F MED LIST DOCD IN RCRD: CPT | Mod: ,,, | Performed by: INTERNAL MEDICINE

## 2017-07-05 PROCEDURE — 71020 XR CHEST PA AND LATERAL: CPT | Mod: 26,,, | Performed by: RADIOLOGY

## 2017-07-05 PROCEDURE — 99214 OFFICE O/P EST MOD 30 MIN: CPT | Mod: S$PBB,,, | Performed by: INTERNAL MEDICINE

## 2017-07-05 PROCEDURE — 99999 PR PBB SHADOW E&M-EST. PATIENT-LVL III: CPT | Mod: PBBFAC,,, | Performed by: INTERNAL MEDICINE

## 2017-07-05 PROCEDURE — 1126F AMNT PAIN NOTED NONE PRSNT: CPT | Mod: ,,, | Performed by: INTERNAL MEDICINE

## 2017-07-05 NOTE — PROGRESS NOTES
Subjective:      Patient ID: Carmela Maria is a 89 y.o. female.    Chief Complaint: Follow-up (7 MONTHS**) and COPD      HPI  Ms. Maria is a patient of mine, who presents for f/u on CHF, COPD, CAD.    She reports feeling more tired lately, which she attributes to her worsening heart function.     She continues cooking, washing and cleaning the house, but feels tired quicker. She walks 8 times around the house, but previously was able to do so without feeling tired and sob. She does not want to know from me exactly what the condition of her heart is, which she has requested multiple times in the past. She is followed by Dr. Jeffers.     She reports coughing more than usual, which is productive of clear or cream colored phlegm.    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 Unruly Ball    CHF (congestive heart failure)     Clotting disorder     COPD (chronic obstructive pulmonary disease)     Coronary artery disease     CVA (cerebral infarction) 2013    Depression     Fall 2013    Heart murmur     Hyperlipidemia     Osteoporosis     Palpitation 2015    Sleep apnea     Traumatic brain injury 2013    Ulcer      Past Surgical History:   Procedure Laterality Date    APPENDECTOMY       SECTION      TUBAL LIGATION       Social History     Social History    Marital status:      Spouse name: N/A    Number of children: N/A    Years of education: N/A     Occupational History    Not on file.     Social History Main Topics    Smoking status: Never Smoker    Smokeless tobacco: Never Used    Alcohol use No    Drug use: No    Sexual activity: No     Other Topics Concern    Not on file     Social History Narrative    No narrative on file     Family History   Problem Relation Age of Onset    COPD Mother     Heart disease Father      Cancer Sister     COPD Sister     COPD Brother        Current Outpatient Prescriptions:     albuterol (PROVENTIL) 5 mg/mL nebulizer solution, Take 0.5 mLs (2.5 mg total) by nebulization every 6 (six) hours as needed for Wheezing., Disp: 1 vial, Rfl: 11    betamethasone dipropionate (DIPROLENE) 0.05 % cream, Apply topically 2 (two) times daily., Disp: 60 g, Rfl: 1    budesonide (PULMICORT) 0.5 mg/2 mL nebulizer solution, Take 2 mLs (0.5 mg total) by nebulization 2 (two) times daily., Disp: 360 mL, Rfl: 3    calcium carbonate (OS-KASIA) 600 mg (1,500 mg) Tab, Take 600 mg by mouth once daily., Disp: , Rfl:     cholecalciferol, vitamin D3, (VITAMIN D3) 400 unit Chew, Take by mouth. Take  tues - thurs - sat, Disp: , Rfl:     citalopram (CELEXA) 40 MG tablet, TAKE 1 TABLET DAILY, Disp: 90 tablet, Rfl: 3    dasatinib (SPRYCEL) 50 MG tablet, Take 1 tablet (50 mg total) by mouth every other day., Disp: , Rfl:     docusate sodium (COLACE) 100 MG capsule, Take by mouth 2 (two) times daily., Disp: , Rfl:     metoprolol succinate (TOPROL XL) 25 MG 24 hr tablet, TAKE ONE-HALF TABLET (12.5 MG) EVERY EVENING, Disp: 30 tablet, Rfl: 10    metoprolol succinate (TOPROL XL) 25 MG 24 hr tablet, Take 1 tablet (25 mg total) by mouth once daily., Disp: 30 tablet, Rfl: 6    ranitidine (ZANTAC) 150 MG capsule, Take 1 capsule (150 mg total) by mouth 2 (two) times daily., Disp: 180 capsule, Rfl: 10    ranolazine (RANEXA) 500 MG Tb12, Take 1 tablet (500 mg total) by mouth 2 (two) times daily., Disp: 180 tablet, Rfl: 2    Review of patient's allergies indicates:   Allergen Reactions    Asmanex twisthaler  [mometasone]      Other reaction(s): Rhinitis  Other reaction(s): Headache  Other reaction(s): Eye irritation    Aspirin      Other reaction(s): Unknown    Brovana  [arformoterol] Nausea Only     Other reaction(s): Headache  Other reaction(s): Dry Nose    Butisol  [butabarbital]      Other reaction(s): Unknown    Codeine      " Other reaction(s): Unknown    Diazepam      Other reaction(s): Unknown    Iodine      Other reaction(s): Unknown    Limbitrol  [amitriptyline-chlordiazepoxide]      Other reaction(s): Unknown  Other reaction(s): Unknown    Medrol  [methylprednisolone]      Other reaction(s): Shortness of breath  Other reaction(s): Shortness of breath    Sulfa (sulfonamide antibiotics)      Other reaction(s): Unknown     Review of Systems   Negative.     Objective:     /62 (BP Location: Left arm, Patient Position: Sitting, BP Method: Automatic)   Pulse 82   Temp 97.8 °F (36.6 °C) (Tympanic)   Resp 16   Ht 4' 11" (1.499 m)   Wt 68.2 kg (150 lb 5.7 oz)   SpO2 98%   BMI 30.37 kg/m²     Physical Exam  GEN: A&O fully, NAD  PSYC: Normal affect  CV: IV/VI EDUARDO best at R 2nd ICS  PULM: CTA bilaterally, no wheezes, rales  EXT: No C/C/E    Lab Results   Component Value Date    WBC 6.69 05/18/2017    HGB 11.9 (L) 05/18/2017    HCT 36.6 (L) 05/18/2017     (L) 05/18/2017    CHOL 169 11/10/2015    TRIG 119 11/10/2015    HDL 55 11/10/2015    ALT 9 (L) 04/24/2017    AST 19 04/24/2017     04/24/2017    K 4.2 04/24/2017     04/24/2017    CREATININE 0.9 04/24/2017    BUN 22 04/24/2017    CO2 33 (H) 04/24/2017    TSH 1.771 12/08/2016    INR 1.0 06/07/2013    GLUF 103 05/03/2006      Ref Range & Units 1mo ago 6yr ago     BNP 0 - 99 pg/mL 378  58CM       Assessment:   1. CHF: Worsening sx and increasing BNP. 2/2 need for AVR, which she declined at ~88y. She reports                  Dr. Jeffers told her not to take any fluid pills. Continue ranolazine.   2. CML: Her dasatinib was recently decreased from 7 times to 3 times per week. Followed by Dr. Unruly Ball.    RTC in RE 3 months or sooner as needed  "

## 2017-07-09 ENCOUNTER — PATIENT MESSAGE (OUTPATIENT)
Dept: CARDIOLOGY | Facility: CLINIC | Age: 82
End: 2017-07-09

## 2017-07-10 ENCOUNTER — TELEPHONE (OUTPATIENT)
Dept: CARDIOLOGY | Facility: CLINIC | Age: 82
End: 2017-07-10

## 2017-07-10 NOTE — TELEPHONE ENCOUNTER
----- Message from Kristen Norton sent at 7/10/2017  2:42 PM CDT -----  Mrs collins came to [facility] trying to bkd appt soon as possible having heart problems , she ask to call [phone] to set up her appt samm foss

## 2017-07-10 NOTE — TELEPHONE ENCOUNTER
Patient is scheduled for soonest available with Dr. Jeffers.  She refused appointment with mid-level provider.

## 2017-08-07 ENCOUNTER — OFFICE VISIT (OUTPATIENT)
Dept: INTERNAL MEDICINE | Facility: CLINIC | Age: 82
End: 2017-08-07
Payer: MEDICARE

## 2017-08-07 VITALS
WEIGHT: 150.56 LBS | TEMPERATURE: 98 F | HEIGHT: 59 IN | BODY MASS INDEX: 30.35 KG/M2 | DIASTOLIC BLOOD PRESSURE: 51 MMHG | SYSTOLIC BLOOD PRESSURE: 107 MMHG | RESPIRATION RATE: 16 BRPM | OXYGEN SATURATION: 96 % | HEART RATE: 60 BPM

## 2017-08-07 DIAGNOSIS — L03.116 CELLULITIS OF BOTH FEET: Primary | ICD-10-CM

## 2017-08-07 DIAGNOSIS — L03.115 CELLULITIS OF BOTH FEET: Primary | ICD-10-CM

## 2017-08-07 PROCEDURE — 3008F BODY MASS INDEX DOCD: CPT | Mod: ,,, | Performed by: INTERNAL MEDICINE

## 2017-08-07 PROCEDURE — 99213 OFFICE O/P EST LOW 20 MIN: CPT | Mod: S$PBB,,, | Performed by: INTERNAL MEDICINE

## 2017-08-07 PROCEDURE — 99213 OFFICE O/P EST LOW 20 MIN: CPT | Mod: PBBFAC,PN | Performed by: INTERNAL MEDICINE

## 2017-08-07 PROCEDURE — 99999 PR PBB SHADOW E&M-EST. PATIENT-LVL III: CPT | Mod: PBBFAC,,, | Performed by: INTERNAL MEDICINE

## 2017-08-07 PROCEDURE — 1159F MED LIST DOCD IN RCRD: CPT | Mod: ,,, | Performed by: INTERNAL MEDICINE

## 2017-08-07 PROCEDURE — 1125F AMNT PAIN NOTED PAIN PRSNT: CPT | Mod: ,,, | Performed by: INTERNAL MEDICINE

## 2017-08-07 PROCEDURE — 87070 CULTURE OTHR SPECIMN AEROBIC: CPT

## 2017-08-07 RX ORDER — CLINDAMYCIN HYDROCHLORIDE 300 MG/1
300 CAPSULE ORAL 3 TIMES DAILY
Qty: 30 CAPSULE | Refills: 0 | Status: SHIPPED | OUTPATIENT
Start: 2017-08-07 | End: 2017-08-16 | Stop reason: SDDI

## 2017-08-07 NOTE — PROGRESS NOTES
Subjective:      Patient ID: Carmela Maria is a 89 y.o. female.    Chief Complaint: Foot Swelling and Blister (on feet**)      HPI  Ms. Maria is a patient of mine, who presents for bilateral foot redness and pain. She reports her dog ran over her feet with uncut nails on Thursday, which led to redness, pain and bleeding. No f/c.     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 Unruly Ball    CHF (congestive heart failure)     Clotting disorder     COPD (chronic obstructive pulmonary disease)     Coronary artery disease     CVA (cerebral infarction) 2013    Depression     Fall 2013    Heart murmur     Hyperlipidemia     Osteoporosis     Palpitation 2015    Sleep apnea     Traumatic brain injury 2013    Ulcer      Past Surgical History:   Procedure Laterality Date    APPENDECTOMY       SECTION      TUBAL LIGATION       Social History     Social History    Marital status:      Spouse name: N/A    Number of children: N/A    Years of education: N/A     Occupational History    Not on file.     Social History Main Topics    Smoking status: Never Smoker    Smokeless tobacco: Never Used    Alcohol use No    Drug use: No    Sexual activity: No     Other Topics Concern    Not on file     Social History Narrative    No narrative on file     Family History   Problem Relation Age of Onset    COPD Mother     Heart disease Father     Cancer Sister     COPD Sister     COPD Brother        Current Outpatient Prescriptions:     albuterol (PROVENTIL) 5 mg/mL nebulizer solution, Take 0.5 mLs (2.5 mg total) by nebulization every 6 (six) hours as needed for Wheezing., Disp: 1 vial, Rfl: 11    betamethasone dipropionate (DIPROLENE) 0.05 % cream, Apply topically 2 (two) times daily., Disp: 60 g, Rfl: 1    budesonide (PULMICORT)  0.5 mg/2 mL nebulizer solution, Take 2 mLs (0.5 mg total) by nebulization 2 (two) times daily., Disp: 360 mL, Rfl: 3    calcium carbonate (OS-KASIA) 600 mg (1,500 mg) Tab, Take 600 mg by mouth once daily., Disp: , Rfl:     cholecalciferol, vitamin D3, (VITAMIN D3) 400 unit Chew, Take by mouth. Take  tues - thurs - sat, Disp: , Rfl:     citalopram (CELEXA) 40 MG tablet, TAKE 1 TABLET DAILY, Disp: 90 tablet, Rfl: 3    dasatinib (SPRYCEL) 50 MG tablet, Take 1 tablet (50 mg total) by mouth every other day., Disp: , Rfl:     docusate sodium (COLACE) 100 MG capsule, Take by mouth 2 (two) times daily., Disp: , Rfl:     metoprolol succinate (TOPROL XL) 25 MG 24 hr tablet, Take 1 tablet (25 mg total) by mouth once daily., Disp: 30 tablet, Rfl: 6    ranitidine (ZANTAC) 150 MG capsule, Take 1 capsule (150 mg total) by mouth 2 (two) times daily., Disp: 180 capsule, Rfl: 10    ranolazine (RANEXA) 500 MG Tb12, Take 1 tablet (500 mg total) by mouth 2 (two) times daily., Disp: 180 tablet, Rfl: 2    clindamycin (CLEOCIN) 300 MG capsule, Take 1 capsule (300 mg total) by mouth 3 (three) times daily., Disp: 30 capsule, Rfl: 0    Review of patient's allergies indicates:   Allergen Reactions    Asmanex twisthaler  [mometasone]      Other reaction(s): Rhinitis  Other reaction(s): Headache  Other reaction(s): Eye irritation    Aspirin      Other reaction(s): Unknown    Brovana  [arformoterol] Nausea Only     Other reaction(s): Headache  Other reaction(s): Dry Nose    Butisol  [butabarbital]      Other reaction(s): Unknown    Codeine      Other reaction(s): Unknown    Diazepam      Other reaction(s): Unknown    Iodine      Other reaction(s): Unknown    Limbitrol  [amitriptyline-chlordiazepoxide]      Other reaction(s): Unknown  Other reaction(s): Unknown    Medrol  [methylprednisolone]      Other reaction(s): Shortness of breath  Other reaction(s): Shortness of breath    Sulfa (sulfonamide antibiotics)      Other  "reaction(s): Unknown     Review of Systems   Negative.     Objective:     BP (!) 107/51 (BP Location: Left arm, Patient Position: Sitting, BP Method: Manual)   Pulse 60   Temp 97.8 °F (36.6 °C) (Tympanic)   Resp 16   Ht 4' 11" (1.499 m)   Wt 68.3 kg (150 lb 9.2 oz)   SpO2 96%   BMI 30.41 kg/m²     Physical Exam  GEN: A&O fully, NAD  PSYC: Normal affect  DERM: Right foot: erythematous rash around ankle 2x7" with a 7x5 mm central ulcerated lesion with serous fluid draining    Lab Results   Component Value Date    WBC 6.69 05/18/2017    HGB 11.9 (L) 05/18/2017    HCT 36.6 (L) 05/18/2017     (L) 05/18/2017    CHOL 169 11/10/2015    TRIG 119 11/10/2015    HDL 55 11/10/2015    ALT 9 (L) 04/24/2017    AST 19 04/24/2017     04/24/2017    K 4.2 04/24/2017     04/24/2017    CREATININE 0.9 04/24/2017    BUN 22 04/24/2017    CO2 33 (H) 04/24/2017    TSH 1.771 12/08/2016    INR 1.0 06/07/2013    GLUF 103 05/03/2006       Assessment:      1. Cellulitis of both feet      Plan:   Cellulitis of both feet  -     clindamycin (CLEOCIN) 300 MG capsule; Take 1 capsule (300 mg total) by mouth 3 (three) times daily.  Dispense: 30 capsule; Refill: 0  -     CULTURE, AEROBIC  (SPECIFY SOURCE)        RTC in 2 weeks RE bilateral foot cellulitis or sooner as needed  "

## 2017-08-10 LAB — BACTERIA SPEC AEROBE CULT: NORMAL

## 2017-08-16 ENCOUNTER — OFFICE VISIT (OUTPATIENT)
Dept: INTERNAL MEDICINE | Facility: CLINIC | Age: 82
End: 2017-08-16
Payer: MEDICARE

## 2017-08-16 ENCOUNTER — APPOINTMENT (OUTPATIENT)
Dept: RADIOLOGY | Facility: HOSPITAL | Age: 82
End: 2017-08-16
Attending: INTERNAL MEDICINE
Payer: MEDICARE

## 2017-08-16 VITALS
WEIGHT: 150.56 LBS | HEIGHT: 59 IN | HEART RATE: 73 BPM | DIASTOLIC BLOOD PRESSURE: 56 MMHG | OXYGEN SATURATION: 91 % | SYSTOLIC BLOOD PRESSURE: 114 MMHG | RESPIRATION RATE: 22 BRPM | TEMPERATURE: 99 F | BODY MASS INDEX: 30.35 KG/M2

## 2017-08-16 DIAGNOSIS — R06.02 SOB (SHORTNESS OF BREATH): ICD-10-CM

## 2017-08-16 DIAGNOSIS — R06.02 SOB (SHORTNESS OF BREATH): Primary | ICD-10-CM

## 2017-08-16 PROCEDURE — 1159F MED LIST DOCD IN RCRD: CPT | Mod: ,,, | Performed by: INTERNAL MEDICINE

## 2017-08-16 PROCEDURE — 99213 OFFICE O/P EST LOW 20 MIN: CPT | Mod: PBBFAC,PN | Performed by: INTERNAL MEDICINE

## 2017-08-16 PROCEDURE — 99214 OFFICE O/P EST MOD 30 MIN: CPT | Mod: S$PBB,,, | Performed by: INTERNAL MEDICINE

## 2017-08-16 PROCEDURE — 1126F AMNT PAIN NOTED NONE PRSNT: CPT | Mod: ,,, | Performed by: INTERNAL MEDICINE

## 2017-08-16 PROCEDURE — 71020 XR CHEST PA AND LATERAL: CPT | Mod: 26,,, | Performed by: RADIOLOGY

## 2017-08-16 PROCEDURE — 99999 PR PBB SHADOW E&M-EST. PATIENT-LVL III: CPT | Mod: PBBFAC,,, | Performed by: INTERNAL MEDICINE

## 2017-08-16 PROCEDURE — 71020 XR CHEST PA AND LATERAL: CPT | Mod: TC,PO

## 2017-08-16 RX ORDER — DOXYCYCLINE HYCLATE 100 MG
100 TABLET ORAL 2 TIMES DAILY
Qty: 14 TABLET | Refills: 0 | Status: SHIPPED | OUTPATIENT
Start: 2017-08-16 | End: 2017-10-09 | Stop reason: ALTCHOICE

## 2017-08-16 RX ORDER — GUAIFENESIN 600 MG/1
600 TABLET, EXTENDED RELEASE ORAL 2 TIMES DAILY
Qty: 60 TABLET | Refills: 2 | Status: SHIPPED | OUTPATIENT
Start: 2017-08-16 | End: 2018-06-04

## 2017-08-16 NOTE — PROGRESS NOTES
Subjective:      Patient ID: Carmela Maria is a 89 y.o. female.    Chief Complaint: Nasal Congestion; Cough; and Shortness of Breath      HPI  Mrs. Maria is a patient of mine, who presents for cough, sob, nasal congestion, which started 3-4 days ago, which she describes as cough productive of yellow-green sputum. She also reports a burning cp that started today, which is constant this afternoon. She hasn't taken her ranitidine for this. She has been taking a peppermint candy for it. She reports dropping her clindamycin on the floor by accident this morning. 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 Unruly Ball    CHF (congestive heart failure)     Clotting disorder     COPD (chronic obstructive pulmonary disease)     Coronary artery disease     CVA (cerebral infarction) 2013    Depression     Fall 2013    Heart murmur     Hyperlipidemia     Osteoporosis     Palpitation 2015    Sleep apnea     Traumatic brain injury 2013    Ulcer      Past Surgical History:   Procedure Laterality Date    APPENDECTOMY       SECTION      TUBAL LIGATION       Social History     Social History    Marital status:      Spouse name: N/A    Number of children: N/A    Years of education: N/A     Occupational History    Not on file.     Social History Main Topics    Smoking status: Never Smoker    Smokeless tobacco: Never Used    Alcohol use No    Drug use: No    Sexual activity: No     Other Topics Concern    Not on file     Social History Narrative    No narrative on file     Family History   Problem Relation Age of Onset    COPD Mother     Heart disease Father     Cancer Sister     COPD Sister     COPD Brother        Current Outpatient Prescriptions:     albuterol  (PROVENTIL) 5 mg/mL nebulizer solution, Take 0.5 mLs (2.5 mg total) by nebulization every 6 (six) hours as needed for Wheezing., Disp: 1 vial, Rfl: 11    betamethasone dipropionate (DIPROLENE) 0.05 % cream, Apply topically 2 (two) times daily., Disp: 60 g, Rfl: 1    budesonide (PULMICORT) 0.5 mg/2 mL nebulizer solution, Take 2 mLs (0.5 mg total) by nebulization 2 (two) times daily., Disp: 360 mL, Rfl: 3    calcium carbonate (OS-KASIA) 600 mg (1,500 mg) Tab, Take 600 mg by mouth once daily., Disp: , Rfl:     cholecalciferol, vitamin D3, (VITAMIN D3) 400 unit Chew, Take by mouth. Take  tues - thurs - sat, Disp: , Rfl:     citalopram (CELEXA) 40 MG tablet, TAKE 1 TABLET DAILY, Disp: 90 tablet, Rfl: 3    dasatinib (SPRYCEL) 50 MG tablet, Take 1 tablet (50 mg total) by mouth every other day., Disp: , Rfl:     docusate sodium (COLACE) 100 MG capsule, Take by mouth 2 (two) times daily., Disp: , Rfl:     metoprolol succinate (TOPROL XL) 25 MG 24 hr tablet, Take 1 tablet (25 mg total) by mouth once daily., Disp: 30 tablet, Rfl: 6    ranitidine (ZANTAC) 150 MG capsule, Take 1 capsule (150 mg total) by mouth 2 (two) times daily., Disp: 180 capsule, Rfl: 10    ranolazine (RANEXA) 500 MG Tb12, Take 1 tablet (500 mg total) by mouth 2 (two) times daily., Disp: 180 tablet, Rfl: 2    doxycycline (VIBRA-TABS) 100 MG tablet, Take 1 tablet (100 mg total) by mouth 2 (two) times daily., Disp: 14 tablet, Rfl: 0    guaifenesin (MUCINEX) 600 mg 12 hr tablet, Take 1 tablet (600 mg total) by mouth 2 (two) times daily., Disp: 60 tablet, Rfl: 2    Review of patient's allergies indicates:   Allergen Reactions    Asmanex twisthaler  [mometasone]      Other reaction(s): Rhinitis  Other reaction(s): Headache  Other reaction(s): Eye irritation    Aspirin      Other reaction(s): Unknown    Brovana  [arformoterol] Nausea Only     Other reaction(s): Headache  Other reaction(s): Dry Nose    Butisol  [butabarbital]      Other reaction(s):  "Unknown    Codeine      Other reaction(s): Unknown    Diazepam      Other reaction(s): Unknown    Iodine      Other reaction(s): Unknown    Limbitrol  [amitriptyline-chlordiazepoxide]      Other reaction(s): Unknown  Other reaction(s): Unknown    Medrol  [methylprednisolone]      Other reaction(s): Shortness of breath  Other reaction(s): Shortness of breath    Sulfa (sulfonamide antibiotics)      Other reaction(s): Unknown     Review of Systems   Negative.     Objective:     BP (!) 114/56 (BP Location: Left arm, Patient Position: Sitting, BP Method: Medium (Automatic))   Pulse 73   Temp 98.7 °F (37.1 °C) (Tympanic)   Resp (!) 22   Ht 4' 11" (1.499 m)   Wt 68.3 kg (150 lb 9.2 oz)   SpO2 (!) 91%   BMI 30.41 kg/m²     Physical Exam  GEN: A&O fully, NAD  PSYC: Normal affect  HEENT: OP: Clear, no LAD, no thyroid masses  CV: RRR, V/VI +EDUARDO best at R 2nd ICS  PULM: +crackles in RLL  EXT: No C/C/E  DERM: Right ankle: Decreased erythema (previously all the way around ankle, now only 1x2 cm small patch)    Lab Results   Component Value Date    WBC 6.69 05/18/2017    HGB 11.9 (L) 05/18/2017    HCT 36.6 (L) 05/18/2017     (L) 05/18/2017    CHOL 169 11/10/2015    TRIG 119 11/10/2015    HDL 55 11/10/2015    ALT 9 (L) 04/24/2017    AST 19 04/24/2017     04/24/2017    K 4.2 04/24/2017     04/24/2017    CREATININE 0.9 04/24/2017    BUN 22 04/24/2017    CO2 33 (H) 04/24/2017    TSH 1.771 12/08/2016    INR 1.0 06/07/2013    GLUF 103 05/03/2006       Assessment:      1. SOB (shortness of breath): Decreased sats to 91% at rest. Will check CXR stat.    2.      Cellulitis, right foot/ankle: Improving. Incomplete tx. Was only taking clindamycin 1 po QD instead of             TID. Will change to doxycycline 100 mg po BID since it may be better tolerated.     Plan:   SOB (shortness of breath)  -     X-Ray Chest PA And Lateral; Future; Expected date: 08/16/2017    Other orders  -     guaifenesin (MUCINEX) 600 " mg 12 hr tablet; Take 1 tablet (600 mg total) by mouth 2 (two) times daily.  Dispense: 60 tablet; Refill: 2  -     doxycycline (VIBRA-TABS) 100 MG tablet; Take 1 tablet (100 mg total) by mouth 2 (two) times daily.  Dispense: 14 tablet; Refill: 0      RTC as scheduled on 8/21/17 or sooner as needed

## 2017-08-17 ENCOUNTER — OFFICE VISIT (OUTPATIENT)
Dept: PULMONOLOGY | Facility: CLINIC | Age: 82
End: 2017-08-17
Payer: MEDICARE

## 2017-08-17 VITALS
BODY MASS INDEX: 30.24 KG/M2 | SYSTOLIC BLOOD PRESSURE: 101 MMHG | RESPIRATION RATE: 17 BRPM | HEART RATE: 68 BPM | HEIGHT: 59 IN | DIASTOLIC BLOOD PRESSURE: 62 MMHG | WEIGHT: 150 LBS | OXYGEN SATURATION: 92 %

## 2017-08-17 DIAGNOSIS — I35.0 AORTIC VALVE STENOSIS, UNSPECIFIED ETIOLOGY: ICD-10-CM

## 2017-08-17 DIAGNOSIS — R06.09 DYSPNEA ON EXERTION: ICD-10-CM

## 2017-08-17 DIAGNOSIS — J45.41 MODERATE PERSISTENT ASTHMA WITH ACUTE EXACERBATION: Primary | ICD-10-CM

## 2017-08-17 DIAGNOSIS — J44.9 CHRONIC OBSTRUCTIVE PULMONARY DISEASE, UNSPECIFIED COPD TYPE: ICD-10-CM

## 2017-08-17 PROBLEM — C92.01: Status: ACTIVE | Noted: 2017-08-17

## 2017-08-17 PROCEDURE — 99215 OFFICE O/P EST HI 40 MIN: CPT | Mod: PBBFAC,PO,25 | Performed by: INTERNAL MEDICINE

## 2017-08-17 PROCEDURE — 1159F MED LIST DOCD IN RCRD: CPT | Mod: ,,, | Performed by: INTERNAL MEDICINE

## 2017-08-17 PROCEDURE — 99215 OFFICE O/P EST HI 40 MIN: CPT | Mod: 25,S$PBB,, | Performed by: INTERNAL MEDICINE

## 2017-08-17 PROCEDURE — 99999 PR PBB SHADOW E&M-EST. PATIENT-LVL V: CPT | Mod: PBBFAC,,, | Performed by: INTERNAL MEDICINE

## 2017-08-17 PROCEDURE — 96372 THER/PROPH/DIAG INJ SC/IM: CPT | Mod: PBBFAC,PO

## 2017-08-17 RX ORDER — BUDESONIDE 0.5 MG/2ML
0.5 INHALANT ORAL 2 TIMES DAILY
Qty: 360 ML | Refills: 3 | Status: SHIPPED | OUTPATIENT
Start: 2017-08-17 | End: 2018-02-21 | Stop reason: SDUPTHER

## 2017-08-17 RX ORDER — PREDNISONE 20 MG/1
TABLET ORAL
Qty: 20 TABLET | Refills: 1 | Status: SHIPPED | OUTPATIENT
Start: 2017-08-17 | End: 2017-08-17 | Stop reason: SDUPTHER

## 2017-08-17 RX ORDER — PREDNISONE 20 MG/1
TABLET ORAL
Qty: 20 TABLET | Refills: 1 | Status: SHIPPED | OUTPATIENT
Start: 2017-08-17 | End: 2017-10-09 | Stop reason: ALTCHOICE

## 2017-08-17 RX ORDER — TRIAMCINOLONE ACETONIDE 40 MG/ML
80 INJECTION, SUSPENSION INTRA-ARTICULAR; INTRAMUSCULAR
Status: COMPLETED | OUTPATIENT
Start: 2017-08-17 | End: 2017-08-17

## 2017-08-17 RX ORDER — ALBUTEROL SULFATE 5 MG/ML
2.5 SOLUTION RESPIRATORY (INHALATION) EVERY 6 HOURS PRN
Qty: 1 VIAL | Refills: 11
Start: 2017-08-17 | End: 2018-02-21 | Stop reason: ALTCHOICE

## 2017-08-17 RX ADMIN — TRIAMCINOLONE ACETONIDE 80 MG: 40 INJECTION, SUSPENSION INTRA-ARTICULAR; INTRAMUSCULAR at 11:08

## 2017-08-17 NOTE — PATIENT INSTRUCTIONS
Triamcinolone injection  What is this medicine?  TRIAMCINOLONE (trye am SIN [place]) is a corticosteroid. It helps to reduce swelling, redness, itching, and allergic reactions. This medicine is used to treat allergies, arthritis, asthma, skin problems, and many other conditions.  How should I use this medicine?  This medicine is injected by a health care professional. After your dose follow your doctor's instructions for your care.  Contact your pediatrician regarding the use of this medicine in children. Special care may be needed.  What side effects may I notice from receiving this medicine?  Side effects that you should report to your doctor or health care professional as soon as possible:  · allergic reactions like skin rash, itching or hives, swelling of the face, lips, or tongue  · black, tarry stools  · breathing problems  · changes in vision  · confusion, depression, excitement, mood swings  · dizziness  · fever, infection, sores that do not heal  · frequent passing of urine  · high blood pressure  · increased thirst  · lumpy, thin skin at site where injected  · menstrual problems  · pain in back, hips, shoulders, ribs  · rounding of face  · seizures  · stomach pain  · swelling of feet, hands  · unusual bruising or red pinpoint spots on the skin  · unusually weak or tired  Side effects that usually do not require medical attention (report to your doctor or health care professional if they continue or are bothersome):  · headache  · increased sweating  · trouble sleeping  · unusual increased growth of hair on the face or body  · upset stomach, nausea  What may interact with this medicine?  Do not take this medicine with any of the following medications:  · mifepristone  This medicine may also interact with the following medications:  · aspirin  · other steroid medicines  · vaccines and other immunization products  What if I miss a dose?  This does not apply.  Where should I keep my medicine?  Keep out of  the reach of children.  Store at room temperature between 15 and 30 degrees C (59 and 86 degrees F). Do not freeze. Protect from light. Throw away any unused medicine after the expiration date.  What should I tell my health care provider before I take this medicine?  They need to know if you have any of these conditions:  · depression, anxiety, or psychotic disturbances  · diabetes  · infection, like tuberculosis, herpes, or fungal infection  · liver disease  · osteoporosis  · previous heart attack  · seizures  · stomach or intestine disease  · thyroid disease  · an unusual or allergic reaction to triamcinolone, corticosteroids, benzyl alcohol, other medicines, foods, dyes, or preservatives  · pregnant or trying to get pregnant  · breast-feeding  What should I watch for while using this medicine?  Visit your doctor or health care professional for regular checks on your progress. If you are diabetic, check your blood sugar as directed. If you are taking this medicine for a long time, carry an identification card with your name, the type and dose of medicine, and your doctor's name and address.  You may need to be on a special diet while taking this medicine. Talk to your doctor.  Do not come in contact with people who have chickenpox or the measles while you are taking this medicine. If you do, call your doctor right away.  Date Last Reviewed:   NOTE:This sheet is a summary. It may not cover all possible information. If you have questions about this medicine, talk to your doctor, pharmacist, or health care provider. Copyright© 2016 [employer]

## 2017-08-17 NOTE — PROGRESS NOTES
Subjective:       Patient ID: Carmela Maria is a 89 y.o. female.    Chief Complaint: COPD and Cough    HPI COPD  She presents for evaluation and treatment of COPD. The patient is currently having symptoms / an exacerbation. Current symptoms include chronic dyspnea, non-productive cough, cough productive of white sputum in small amounts and wheezing. Symptoms have been present since several weeks ago and have been gradually worsening. She denies chills and fever. Associated symptoms include fatigue, poor exercise tolerance, shortness of breath and weakness.  This episode appears to have been triggered by no identifiable factor. Treatments tried for the current exacerbation: albuterol nebulizer. The patient has been having similar episodes for approximately 10 years.   She uses sleeps in a chair pillows at night. Patient currently is not on home oxygen therapy.. The patient is having no constitutional symptoms, denying fever, chills, anorexia, or weight loss. The patient has been hospitalized for this condition before. She has never smoked. The patient is experiencing exercise intolerance (difficulty walking 10 feet on flat ground).  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 Unruly Ball    CHF (congestive heart failure)     Clotting disorder     COPD (chronic obstructive pulmonary disease)     Coronary artery disease     CVA (cerebral infarction) 2013    Depression     Fall 2013    Heart murmur     Hyperlipidemia     Osteoporosis     Palpitation 2015    Sleep apnea     Traumatic brain injury 2013    Ulcer      Past Surgical History:   Procedure Laterality Date    APPENDECTOMY       SECTION      TUBAL LIGATION       Social History     Social History    Marital status:      Spouse name: N/A    Number of children: N/A     Years of education: N/A     Occupational History    Not on file.     Social History Main Topics    Smoking status: Never Smoker    Smokeless tobacco: Never Used    Alcohol use No    Drug use: No    Sexual activity: No     Other Topics Concern    Not on file     Social History Narrative    No narrative on file     Review of Systems   Constitutional: Positive for fatigue. Negative for fever.   HENT: Positive for postnasal drip, rhinorrhea and congestion.    Eyes: Negative for redness and itching.   Respiratory: Positive for cough, sputum production, shortness of breath, dyspnea on extertion, use of rescue inhaler and Paroxysmal Nocturnal Dyspnea.    Cardiovascular: Negative for chest pain, palpitations and leg swelling.   Genitourinary: Negative for difficulty urinating and hematuria.   Endocrine: Negative for cold intolerance and heat intolerance.    Skin: Negative for rash.   Gastrointestinal: Negative for nausea and abdominal pain.   Neurological: Negative for dizziness, syncope, weakness and light-headedness.   Hematological: Negative for adenopathy. Does not bruise/bleed easily.   Psychiatric/Behavioral: Negative for sleep disturbance. The patient is not nervous/anxious.        Objective:      Physical Exam   Constitutional: She is oriented to person, place, and time. She appears well-developed and well-nourished.   HENT:   Head: Normocephalic and atraumatic.   Mouth/Throat: Oropharyngeal exudate present.   Eyes: Conjunctivae are normal. Pupils are equal, round, and reactive to light.   Neck: Neck supple. No JVD present. No tracheal deviation present. No thyromegaly present.   Cardiovascular: Normal rate.  An irregularly irregular rhythm present. Exam reveals decreased pulses.    Murmur heard.   Systolic murmur is present with a grade of 2/6   Pulmonary/Chest: Effort normal. No respiratory distress. She has decreased breath sounds. She has wheezes in the right lower field and the left lower field. She has  no rhonchi. She has no rales. She exhibits no tenderness.   Abdominal: Soft. Bowel sounds are normal.   Musculoskeletal: Normal range of motion. She exhibits no edema.   Lymphadenopathy:     She has no cervical adenopathy.   Neurological: She is alert and oriented to person, place, and time.   Skin: Skin is warm and dry.   Nursing note and vitals reviewed.    Personal Diagnostic Review  Chest X-Ray: I personally reviewed the films and findings are:, air trapping/emphysema, cardiomegaly  Pulmonary function tests:  No recent  No flowsheet data found.      Assessment:       1. Moderate persistent asthma with acute exacerbation    2. Chronic obstructive pulmonary disease, unspecified COPD type        Outpatient Encounter Prescriptions as of 8/17/2017   Medication Sig Dispense Refill    albuterol (PROVENTIL) 5 mg/mL nebulizer solution Take 0.5 mLs (2.5 mg total) by nebulization every 6 (six) hours as needed for Wheezing. 1 vial 11    betamethasone dipropionate (DIPROLENE) 0.05 % cream Apply topically 2 (two) times daily. 60 g 1    budesonide (PULMICORT) 0.5 mg/2 mL nebulizer solution Take 2 mLs (0.5 mg total) by nebulization 2 (two) times daily. 360 mL 3    calcium carbonate (OS-KASIA) 600 mg (1,500 mg) Tab Take 600 mg by mouth once daily.      cholecalciferol, vitamin D3, (VITAMIN D3) 400 unit Chew Take by mouth. Take  tues - thurs - sat      dasatinib (SPRYCEL) 50 MG tablet Take 1 tablet (50 mg total) by mouth every other day.      docusate sodium (COLACE) 100 MG capsule Take by mouth 2 (two) times daily.      doxycycline (VIBRA-TABS) 100 MG tablet Take 1 tablet (100 mg total) by mouth 2 (two) times daily. 14 tablet 0    guaifenesin (MUCINEX) 600 mg 12 hr tablet Take 1 tablet (600 mg total) by mouth 2 (two) times daily. 60 tablet 2    metoprolol succinate (TOPROL XL) 25 MG 24 hr tablet Take 1 tablet (25 mg total) by mouth once daily. 30 tablet 6    ranitidine (ZANTAC) 150 MG capsule Take 1 capsule (150 mg  total) by mouth 2 (two) times daily. 180 capsule 10    ranolazine (RANEXA) 500 MG Tb12 Take 1 tablet (500 mg total) by mouth 2 (two) times daily. 180 tablet 2    [DISCONTINUED] albuterol (PROVENTIL) 5 mg/mL nebulizer solution Take 0.5 mLs (2.5 mg total) by nebulization every 6 (six) hours as needed for Wheezing. 1 vial 11    [DISCONTINUED] budesonide (PULMICORT) 0.5 mg/2 mL nebulizer solution Take 2 mLs (0.5 mg total) by nebulization 2 (two) times daily. 360 mL 3    citalopram (CELEXA) 40 MG tablet TAKE 1 TABLET DAILY 90 tablet 3    predniSONE (DELTASONE) 20 MG tablet Prednisone 60 mg/ day for 3 days, 40 mg/day for 3 days,20 mg/ day for 3 days, (1/2 tablet )10 mg a day for 3 days. 20 tablet 1    [DISCONTINUED] predniSONE (DELTASONE) 20 MG tablet Prednisone 60 mg/ day for 3 days, 40 mg/day for 3 days,20 mg/ day for 3 days, (1/2 tablet )10 mg a day for 3 days. 20 tablet 1     Facility-Administered Encounter Medications as of 8/17/2017   Medication Dose Route Frequency Provider Last Rate Last Dose    triamcinolone acetonide injection 80 mg  80 mg Intramuscular 1 time in Clinic/HOD Gigi Arroyo MD         Orders Placed This Encounter   Procedures    Ambulatory referral to Home Health     Referral Priority:   Routine     Referral Type:   Home Health     Referral Reason:   Specialty Services Required     Requested Specialty:   Home Health Services     Number of Visits Requested:   1     Plan:       Requested Prescriptions     Signed Prescriptions Disp Refills    albuterol (PROVENTIL) 5 mg/mL nebulizer solution 1 vial 11     Sig: Take 0.5 mLs (2.5 mg total) by nebulization every 6 (six) hours as needed for Wheezing.    budesonide (PULMICORT) 0.5 mg/2 mL nebulizer solution 360 mL 3     Sig: Take 2 mLs (0.5 mg total) by nebulization 2 (two) times daily.    predniSONE (DELTASONE) 20 MG tablet 20 tablet 1     Sig: Prednisone 60 mg/ day for 3 days, 40 mg/day for 3 days,20 mg/ day for 3 days, (1/2 tablet )10 mg a  day for 3 days.     Moderate persistent asthma with acute exacerbation  -     triamcinolone acetonide injection 80 mg; Inject 2 mLs (80 mg total) into the muscle one time.  -     Discontinue: predniSONE (DELTASONE) 20 MG tablet; Prednisone 60 mg/ day for 3 days, 40 mg/day for 3 days,20 mg/ day for 3 days, (1/2 tablet )10 mg a day for 3 days.  Dispense: 20 tablet; Refill: 1  -     Ambulatory referral to Home Health  -     predniSONE (DELTASONE) 20 MG tablet; Prednisone 60 mg/ day for 3 days, 40 mg/day for 3 days,20 mg/ day for 3 days, (1/2 tablet )10 mg a day for 3 days.  Dispense: 20 tablet; Refill: 1    Chronic obstructive pulmonary disease, unspecified COPD type  -     albuterol (PROVENTIL) 5 mg/mL nebulizer solution; Take 0.5 mLs (2.5 mg total) by nebulization every 6 (six) hours as needed for Wheezing.  Dispense: 1 vial; Refill: 11  -     budesonide (PULMICORT) 0.5 mg/2 mL nebulizer solution; Take 2 mLs (0.5 mg total) by nebulization 2 (two) times daily.  Dispense: 360 mL; Refill: 3  -     Ambulatory referral to Home Health    Side effects of steroid medications discussed in detail. These side effects include : changes in mood, hyperglycemia, cataracts, fluid retention, hypertension, diabetes and weight gain.    Return in about 6 months (around 2/17/2018).    MEDICAL DECISION MAKING: Moderate to high complexity.  Overall, the multiple problems listed are of moderate to high severity that may impact quality of life and activities of daily living. Side effects of medications, treatment plan as well as options and alternatives reviewed and discussed with patient. There was counseling of patient concerning these issues.    Total time spent in face to face counseling and coordination of care - 40 minutes over 50% of time was used in discussion of prognosis, risks, benefits of treatment, instructions and compliance with regimen . Discussion with other physicians or health care providers ([facility], [facility]  equipment providers).

## 2017-08-19 ENCOUNTER — HOSPITAL ENCOUNTER (EMERGENCY)
Facility: HOSPITAL | Age: 82
Discharge: HOME | End: 2017-08-19
Attending: EMERGENCY MEDICINE
Payer: MEDICARE

## 2017-08-19 VITALS
HEIGHT: 59 IN | BODY MASS INDEX: 30.24 KG/M2 | SYSTOLIC BLOOD PRESSURE: 126 MMHG | HEART RATE: 70 BPM | RESPIRATION RATE: 20 BRPM | TEMPERATURE: 98 F | OXYGEN SATURATION: 95 % | WEIGHT: 150 LBS | DIASTOLIC BLOOD PRESSURE: 47 MMHG

## 2017-08-19 DIAGNOSIS — R06.02 SOB (SHORTNESS OF BREATH): ICD-10-CM

## 2017-08-19 DIAGNOSIS — I50.9 ACUTE ON CHRONIC CONGESTIVE HEART FAILURE, UNSPECIFIED CONGESTIVE HEART FAILURE TYPE: Primary | ICD-10-CM

## 2017-08-19 LAB
ALBUMIN SERPL BCP-MCNC: 3.1 G/DL
ALP SERPL-CCNC: 90 U/L
ALT SERPL W/O P-5'-P-CCNC: 10 U/L
ANION GAP SERPL CALC-SCNC: 8 MMOL/L
APTT BLDCRRT: 29.4 SEC
AST SERPL-CCNC: 12 U/L
BACTERIA #/AREA URNS HPF: NORMAL /HPF
BASOPHILS # BLD AUTO: 0 K/UL
BASOPHILS NFR BLD: 0 %
BILIRUB SERPL-MCNC: 0.2 MG/DL
BILIRUB UR QL STRIP: NEGATIVE
BNP SERPL-MCNC: 776 PG/ML
BUN SERPL-MCNC: 31 MG/DL
CALCIUM SERPL-MCNC: 9.6 MG/DL
CHLORIDE SERPL-SCNC: 103 MMOL/L
CK MB SERPL-MCNC: 2.2 NG/ML
CK MB SERPL-RTO: 12.9 %
CK SERPL-CCNC: 17 U/L
CK SERPL-CCNC: 17 U/L
CLARITY UR: CLEAR
CO2 SERPL-SCNC: 31 MMOL/L
COLOR UR: YELLOW
CREAT SERPL-MCNC: 0.8 MG/DL
DIFFERENTIAL METHOD: ABNORMAL
EOSINOPHIL # BLD AUTO: 0 K/UL
EOSINOPHIL NFR BLD: 0 %
ERYTHROCYTE [DISTWIDTH] IN BLOOD BY AUTOMATED COUNT: 13.8 %
EST. GFR  (AFRICAN AMERICAN): >60 ML/MIN/1.73 M^2
EST. GFR  (NON AFRICAN AMERICAN): >60 ML/MIN/1.73 M^2
GLUCOSE SERPL-MCNC: 160 MG/DL
GLUCOSE UR QL STRIP: NEGATIVE
HCT VFR BLD AUTO: 36.5 %
HGB BLD-MCNC: 12 G/DL
HGB UR QL STRIP: ABNORMAL
HYALINE CASTS #/AREA URNS LPF: 0 /LPF
INR PPP: 1
KETONES UR QL STRIP: NEGATIVE
LEUKOCYTE ESTERASE UR QL STRIP: NEGATIVE
LYMPHOCYTES # BLD AUTO: 1.1 K/UL
LYMPHOCYTES NFR BLD: 13.5 %
MCH RBC QN AUTO: 32 PG
MCHC RBC AUTO-ENTMCNC: 32.9 G/DL
MCV RBC AUTO: 97 FL
MICROSCOPIC COMMENT: NORMAL
MONOCYTES # BLD AUTO: 0.2 K/UL
MONOCYTES NFR BLD: 2.4 %
NEUTROPHILS # BLD AUTO: 6.6 K/UL
NEUTROPHILS NFR BLD: 84.1 %
NITRITE UR QL STRIP: NEGATIVE
PH UR STRIP: 6 [PH] (ref 5–8)
PLATELET # BLD AUTO: 201 K/UL
PMV BLD AUTO: 13 FL
POTASSIUM SERPL-SCNC: 5.2 MMOL/L
PROT SERPL-MCNC: 7.2 G/DL
PROT UR QL STRIP: ABNORMAL
PROTHROMBIN TIME: 10.1 SEC
RBC # BLD AUTO: 3.75 M/UL
RBC #/AREA URNS HPF: 2 /HPF (ref 0–4)
SODIUM SERPL-SCNC: 142 MMOL/L
SP GR UR STRIP: >=1.03 (ref 1–1.03)
TROPONIN I SERPL DL<=0.01 NG/ML-MCNC: 0.01 NG/ML
URN SPEC COLLECT METH UR: ABNORMAL
UROBILINOGEN UR STRIP-ACNC: 1 EU/DL
WBC # BLD AUTO: 7.86 K/UL
WBC #/AREA URNS HPF: 2 /HPF (ref 0–5)

## 2017-08-19 PROCEDURE — 85730 THROMBOPLASTIN TIME PARTIAL: CPT

## 2017-08-19 PROCEDURE — 81000 URINALYSIS NONAUTO W/SCOPE: CPT

## 2017-08-19 PROCEDURE — 85025 COMPLETE CBC W/AUTO DIFF WBC: CPT

## 2017-08-19 PROCEDURE — 99284 EMERGENCY DEPT VISIT MOD MDM: CPT | Mod: 25

## 2017-08-19 PROCEDURE — 80053 COMPREHEN METABOLIC PANEL: CPT

## 2017-08-19 PROCEDURE — 93005 ELECTROCARDIOGRAM TRACING: CPT

## 2017-08-19 PROCEDURE — 93010 ELECTROCARDIOGRAM REPORT: CPT | Mod: ,,, | Performed by: INTERNAL MEDICINE

## 2017-08-19 PROCEDURE — 83880 ASSAY OF NATRIURETIC PEPTIDE: CPT

## 2017-08-19 PROCEDURE — 82553 CREATINE MB FRACTION: CPT

## 2017-08-19 PROCEDURE — 84484 ASSAY OF TROPONIN QUANT: CPT

## 2017-08-19 PROCEDURE — 85610 PROTHROMBIN TIME: CPT

## 2017-08-19 PROCEDURE — 25000003 PHARM REV CODE 250: Performed by: EMERGENCY MEDICINE

## 2017-08-19 RX ORDER — FUROSEMIDE 40 MG/1
40 TABLET ORAL
Status: COMPLETED | OUTPATIENT
Start: 2017-08-20 | End: 2017-08-19

## 2017-08-19 RX ADMIN — FUROSEMIDE 40 MG: 40 TABLET ORAL at 11:08

## 2017-08-20 NOTE — ED PROVIDER NOTES
SCRIBE #1 NOTE: I, Tereza Martinez, am scribing for, and in the presence of, Jose Alejandro Rodrigez MD. I have scribed the entire note.      History      Chief Complaint   Patient presents with    Shortness of Breath     reports recently dx with bronchitits by Dr Arroyo, reports is not improving and that her urine is dark as well        Review of patient's allergies indicates:   Allergen Reactions    Asmanex twisthaler  [mometasone]      Other reaction(s): Rhinitis  Other reaction(s): Headache  Other reaction(s): Eye irritation    Aspirin      Other reaction(s): Unknown    Brovana  [arformoterol] Nausea Only     Other reaction(s): Headache  Other reaction(s): Dry Nose    Butisol  [butabarbital]      Other reaction(s): Unknown    Codeine      Other reaction(s): Unknown    Diazepam      Other reaction(s): Unknown    Iodine      Other reaction(s): Unknown    Limbitrol  [amitriptyline-chlordiazepoxide]      Other reaction(s): Unknown  Other reaction(s): Unknown    Medrol  [methylprednisolone]      Other reaction(s): Shortness of breath  Other reaction(s): Shortness of breath    Sulfa (sulfonamide antibiotics)      Other reaction(s): Unknown        HPI   HPI    8/19/2017, 9:43 PM   History obtained from the patient      History of Present Illness: Carmela Maria is a 89 y.o. female patient who presents to the Emergency Department for SOB which onset gradually 1 week ago. Symptoms are constant and moderate in severity. Pt reports that she noticed her SOB worsening last night. Pt reports recently being dx with bronchitis by Dr. Arroyo. Pt reports that she is not on home oxygen. Sxs are exacerbated with laying flat and movement. Pt reports that she sleeps in a recliner so that she can breath. Associated sxs include mild intermittent L sided CP. Patient denies any fever, diaphoresis, cough, wheezing, leg swelling, palpitations, dizziness, light headedness, and all other sxs at this time. No further complaints or concerns at  this time.         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 Unruly Ball    CHF (congestive heart failure)     Clotting disorder     COPD (chronic obstructive pulmonary disease)     Coronary artery disease     CVA (cerebral infarction) 2013    Depression     Fall 2013    Heart murmur     Hyperlipidemia     Osteoporosis     Palpitation 2015    Sleep apnea     Traumatic brain injury 2013    Ulcer        Past Surgical History:  Past Surgical History:   Procedure Laterality Date    APPENDECTOMY       SECTION      TUBAL LIGATION           Family History:  Family History   Problem Relation Age of Onset    COPD Mother     Heart disease Father     Cancer Sister     COPD Sister     COPD Brother        Social History:  Social History     Social History Main Topics    Smoking status: Never Smoker    Smokeless tobacco: Never Used    Alcohol use No    Drug use: No    Sexual activity: No       ROS   Review of Systems   Constitutional: Negative for diaphoresis and fever.   HENT: Negative for sore throat.    Respiratory: Positive for shortness of breath. Negative for cough and wheezing.    Cardiovascular: Positive for chest pain. Negative for palpitations and leg swelling.   Gastrointestinal: Negative for nausea.   Genitourinary: Negative for dysuria.   Musculoskeletal: Negative for back pain.   Skin: Negative for rash.   Neurological: Negative for weakness.   Hematological: Does not bruise/bleed easily.       Physical Exam      Initial Vitals [174]   BP Pulse Resp Temp SpO2   134/68 86 16 97.7 °F (36.5 °C) (!) 91 %      MAP       90          Physical Exam  Nursing Notes and Vital Signs Reviewed.  Constitutional: Patient is in no apparent  "distress. Elderly. Frail.  Head: Atraumatic. Normocephalic.  Eyes: PERRL. EOM intact. Conjunctivae are not pale. No scleral icterus.  ENT: Mucous membranes are moist. Oropharynx is clear and symmetric.    Neck: Supple. Full ROM. No lymphadenopathy.  Cardiovascular: Regular rate. Regular rhythm. No murmurs, rubs, or gallops. Distal pulses are 2+ and symmetric.  Pulmonary/Chest: Tachypneic. Clear to auscultation bilaterally. No wheezing, rales, or rhonchi.  Abdominal: Soft and non-distended.  There is no tenderness.  No rebound, guarding, or rigidity. Good bowel sounds.  Genitourinary: No CVA tenderness  Musculoskeletal: Moves all extremities. No obvious deformities. No edema. No calf tenderness.  Skin: Warm and dry.  Neurological:  Alert, awake, and appropriate.  Normal speech.  No acute focal neurological deficits are appreciated.  Psychiatric: Normal affect. Good eye contact. Appropriate in content.    ED Course    Procedures  ED Vital Signs:  Vitals:    08/19/17 2124 08/19/17 2138 08/19/17 2215 08/19/17 2330   BP: 134/68 131/62 123/69 (!) 125/49   Pulse: 86 82 74 71   Resp: 16 (!) 23 (!) 22 (!) 23   Temp: 97.7 °F (36.5 °C)      TempSrc: Oral      SpO2: (!) 91% 98% 97% 96%   Weight: 68 kg (150 lb)      Height: 4' 11" (1.499 m)       08/19/17 2345   BP: (!) 126/47   Pulse: 70   Resp: 20   Temp: 98 °F (36.7 °C)   TempSrc:    SpO2: 95%   Weight:    Height:        Abnormal Lab Results:  Labs Reviewed   CBC W/ AUTO DIFFERENTIAL - Abnormal; Notable for the following:        Result Value    RBC 3.75 (*)     Hematocrit 36.5 (*)     MCH 32.0 (*)     MPV 13.0 (*)     Mono # 0.2 (*)     Gran% 84.1 (*)     Lymph% 13.5 (*)     Mono% 2.4 (*)     All other components within normal limits   COMPREHENSIVE METABOLIC PANEL - Abnormal; Notable for the following:     Potassium 5.2 (*)     CO2 31 (*)     Glucose 160 (*)     BUN, Bld 31 (*)     Albumin 3.1 (*)     All other components within normal limits   B-TYPE NATRIURETIC PEPTIDE - " Abnormal; Notable for the following:      (*)     All other components within normal limits   CK-MB - Abnormal; Notable for the following:     CPK 17 (*)     MB% 12.9 (*)     All other components within normal limits   CK - Abnormal; Notable for the following:     CPK 17 (*)     All other components within normal limits   URINALYSIS - Abnormal; Notable for the following:     Specific Gravity, UA >=1.030 (*)     Protein, UA 1+ (*)     Occult Blood UA Trace (*)     All other components within normal limits   PROTIME-INR   APTT   TROPONIN I   URINALYSIS MICROSCOPIC        All Lab Results:  Results for orders placed or performed during the hospital encounter of 08/19/17   CBC auto differential   Result Value Ref Range    WBC 7.86 3.90 - 12.70 K/uL    RBC 3.75 (L) 4.00 - 5.40 M/uL    Hemoglobin 12.0 12.0 - 16.0 g/dL    Hematocrit 36.5 (L) 37.0 - 48.5 %    MCV 97 82 - 98 fL    MCH 32.0 (H) 27.0 - 31.0 pg    MCHC 32.9 32.0 - 36.0 g/dL    RDW 13.8 11.5 - 14.5 %    Platelets 201 150 - 350 K/uL    MPV 13.0 (H) 9.2 - 12.9 fL    Gran # 6.6 1.8 - 7.7 K/uL    Lymph # 1.1 1.0 - 4.8 K/uL    Mono # 0.2 (L) 0.3 - 1.0 K/uL    Eos # 0.0 0.0 - 0.5 K/uL    Baso # 0.00 0.00 - 0.20 K/uL    Gran% 84.1 (H) 38.0 - 73.0 %    Lymph% 13.5 (L) 18.0 - 48.0 %    Mono% 2.4 (L) 4.0 - 15.0 %    Eosinophil% 0.0 0.0 - 8.0 %    Basophil% 0.0 0.0 - 1.9 %    Differential Method Automated    Comprehensive metabolic panel   Result Value Ref Range    Sodium 142 136 - 145 mmol/L    Potassium 5.2 (H) 3.5 - 5.1 mmol/L    Chloride 103 95 - 110 mmol/L    CO2 31 (H) 23 - 29 mmol/L    Glucose 160 (H) 70 - 110 mg/dL    BUN, Bld 31 (H) 10 - 30 mg/dL    Creatinine 0.8 0.5 - 1.4 mg/dL    Calcium 9.6 8.7 - 10.5 mg/dL    Total Protein 7.2 6.0 - 8.4 g/dL    Albumin 3.1 (L) 3.5 - 5.2 g/dL    Total Bilirubin 0.2 0.1 - 1.0 mg/dL    Alkaline Phosphatase 90 55 - 135 U/L    AST 12 10 - 40 U/L    ALT 10 10 - 44 U/L    Anion Gap 8 8 - 16 mmol/L    eGFR if African American  >60 >60 mL/min/1.73 m^2    eGFR if non African American >60 >60 mL/min/1.73 m^2   Protime-INR   Result Value Ref Range    Prothrombin Time 10.1 9.0 - 12.5 sec    INR 1.0 0.8 - 1.2   APTT   Result Value Ref Range    aPTT 29.4 21.0 - 32.0 sec   Troponin I   Result Value Ref Range    Troponin I 0.013 0.000 - 0.026 ng/mL   Brain natriuretic peptide   Result Value Ref Range     (H) 0 - 99 pg/mL   CK-MB   Result Value Ref Range    CPK 17 (L) 20 - 180 U/L    CPK MB 2.2 0.1 - 6.5 ng/mL    MB% 12.9 (H) 0.0 - 5.0 %   CK   Result Value Ref Range    CPK 17 (L) 20 - 180 U/L   Urinalysis   Result Value Ref Range    Specimen UA Urine, Clean Catch     Color, UA Yellow Yellow, Straw, Zully    Appearance, UA Clear Clear    pH, UA 6.0 5.0 - 8.0    Specific Gravity, UA >=1.030 (A) 1.005 - 1.030    Protein, UA 1+ (A) Negative    Glucose, UA Negative Negative    Ketones, UA Negative Negative    Bilirubin (UA) Negative Negative    Occult Blood UA Trace (A) Negative    Nitrite, UA Negative Negative    Urobilinogen, UA 1.0 <2.0 EU/dL    Leukocytes, UA Negative Negative   Urinalysis Microscopic   Result Value Ref Range    RBC, UA 2 0 - 4 /hpf    WBC, UA 2 0 - 5 /hpf    Bacteria, UA Occasional None-Occ /hpf    Hyaline Casts, UA 0 0-1/lpf /lpf    Microscopic Comment SEE COMMENT          Imaging Results:  Imaging Results          X-Ray Chest 1 View (Final result)  Result time 08/19/17 22:47:09    Final result by Brett Kumar MD (08/19/17 22:47:09)                 Impression:     Chronic lung coarsening but no acute lung infiltrate.            Electronically signed by: BRETT KUMAR MD  Date:     08/19/17  Time:    22:47              Narrative:    Exam: Portable chest radiograph    Clinical History: Shortness of breath.     Comparison: Chest x-ray, 08/16/2017.    Findings:.     There is chronic interstitial lung scarring.  No consolidation. Heart size is normal. No pleural effusion or pneumothorax.                             The EKG was  ordered, reviewed, and independently interpreted by the ED provider.  Interpretation time: 21:31  Rate: 80 BPM  Rhythm: normal sinus rhythm  Interpretation: No acute ST changes. No STEMI.             The Emergency Provider reviewed the vital signs and test results, which are outlined above.    ED Discussion     12:57 AM: Reassessed pt at this time. Pt is in no distress. Pt's daughter states that pt stopped taking her lasix at home because she did not like the increased urine output. Pt is instructed to continue taking her lasix as instructed.  Discussed with pt all pertinent ED information and results. Discussed pt dx and plan of tx. Gave pt all f/u and return to the ED instructions. All questions and concerns were addressed at this time. Pt expresses understanding of information and instructions, and is comfortable with plan to discharge. Pt is stable for discharge.    I discussed with patient and/or family/caretaker that evaluation in the ED does not suggest any emergent or life threatening medical conditions requiring immediate intervention beyond what was provided in the ED, and I believe patient is safe for discharge.  Regardless, an unremarkable evaluation in the ED does not preclude the development or presence of a serious of life threatening condition. As such, patient was instructed to return immediately for any worsening or change in current symptoms.      ED Medication(s):  Medications   furosemide tablet 40 mg (40 mg Oral Given 8/19/17 8295)       Discharge Medication List as of 8/19/2017 11:52 PM          Follow-up Information     Aden Jones MD In 2 days.    Specialty:  Internal Medicine  Contact information:  [address] [facility]  [place] [address]  [phone]             [facility] - .    Specialty:  Emergency Medicine  Why:  If symptoms worsen  Contact information:  [address] [facility] [address] [phone]                   Medical Decision  Making    Medical Decision Making:   Clinical Tests:   Lab Tests: Ordered and Reviewed  Radiological Study: Ordered and Reviewed  Medical Tests: Ordered and Reviewed           Scribe Attestation:   Scribe #1: I performed the above scribed service and the documentation accurately describes the services I performed. I attest to the accuracy of the note.    Attending:   Physician Attestation Statement for Scribe #1: I, Jose Alejandro Rodrigez MD, personally performed the services described in this documentation, as scribed by Tereza Martinez, in my presence, and it is both accurate and complete.          Clinical Impression       ICD-10-CM ICD-9-CM   1. Acute on chronic congestive heart failure, unspecified congestive heart failure type I50.9 428.0   2. SOB (shortness of breath) R06.02 786.05       Disposition:   Disposition: Discharged  Condition: Stable         Jose Alejandro Rodrigez MD  08/20/17 4490

## 2017-08-20 NOTE — ED NOTES
Pt resting in ER stretcher, aaox4, rr e/u, NAD noted. Pt remains on cardiac monitor with vss noted. Bed low and locked, call light in reach, side rails up x2. Son and daughter-in-law at bedside.  Pt verbalized understanding of status and POC; denies further needs. Will continue to monitor.

## 2017-08-20 NOTE — ED NOTES
Pt instructed on need for urine sample. Pt informed to alert staff when urine sample available. Pt verbalizes understanding. Pt daughter at bedside to assist with proper cleansing and assist onto bedpan.

## 2017-08-31 ENCOUNTER — TELEPHONE (OUTPATIENT)
Dept: INTERNAL MEDICINE | Facility: CLINIC | Age: 82
End: 2017-08-31

## 2017-08-31 ENCOUNTER — OFFICE VISIT (OUTPATIENT)
Dept: INTERNAL MEDICINE | Facility: CLINIC | Age: 82
End: 2017-08-31
Payer: MEDICARE

## 2017-08-31 VITALS
TEMPERATURE: 98 F | SYSTOLIC BLOOD PRESSURE: 110 MMHG | OXYGEN SATURATION: 95 % | HEART RATE: 70 BPM | DIASTOLIC BLOOD PRESSURE: 56 MMHG

## 2017-08-31 DIAGNOSIS — B35.1 ONYCHOMYCOSIS: ICD-10-CM

## 2017-08-31 DIAGNOSIS — S51.812A SKIN TEAR OF FOREARM WITHOUT COMPLICATION, LEFT, INITIAL ENCOUNTER: Primary | ICD-10-CM

## 2017-08-31 PROCEDURE — 12002 RPR S/N/AX/GEN/TRNK2.6-7.5CM: CPT | Mod: S$PBB,,, | Performed by: INTERNAL MEDICINE

## 2017-08-31 PROCEDURE — 99213 OFFICE O/P EST LOW 20 MIN: CPT | Mod: PBBFAC,PN,25 | Performed by: INTERNAL MEDICINE

## 2017-08-31 PROCEDURE — 99214 OFFICE O/P EST MOD 30 MIN: CPT | Mod: 25,S$PBB,, | Performed by: INTERNAL MEDICINE

## 2017-08-31 PROCEDURE — 1159F MED LIST DOCD IN RCRD: CPT | Mod: ,,, | Performed by: INTERNAL MEDICINE

## 2017-08-31 PROCEDURE — 99999 PR PBB SHADOW E&M-EST. PATIENT-LVL III: CPT | Mod: PBBFAC,,, | Performed by: INTERNAL MEDICINE

## 2017-08-31 PROCEDURE — 12002 RPR S/N/AX/GEN/TRNK2.6-7.5CM: CPT | Mod: PBBFAC,PN | Performed by: INTERNAL MEDICINE

## 2017-08-31 NOTE — TELEPHONE ENCOUNTER
----- Message from Tania Gay sent at 8/31/2017  8:36 AM CDT -----  Contact: mansi/son  Would like to speak to nurse about pt fall 8/30. Please call back at [phone]. thanks

## 2017-08-31 NOTE — PROGRESS NOTES
Subjective:      Patient ID: Carmela Maria is a 89 y.o. female.    Chief Complaint: Fall      HPI  Ms. Maria is a patient of mine, who presents for recent fall. She is accompanied by her son. She reports falling down last night after tripping over her 's oxygen tubing and fell and hit her head, left forearm and was seen in the ER in [facility], who sent over the records by fax. There, VS WNL, A CT head w/o, CT cervical spine w/o, XR left radius/ulna all WNL. She was dx'd with contusion of her forehead and tear of skin on her left forearm. Her wound was cleaned and dressed without sutures and was treated with ice pk to forehead. She reports being able to see through both eyes and her right eye subsequently became swollen shut last night.     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 Unruly Ball    CHF (congestive heart failure)     Clotting disorder     COPD (chronic obstructive pulmonary disease)     Coronary artery disease     CVA (cerebral infarction) 2013    Depression     Fall 2013    Heart murmur     Hyperlipidemia     Osteoporosis     Palpitation 2015    Sleep apnea     Traumatic brain injury 2013    Ulcer      Past Surgical History:   Procedure Laterality Date    APPENDECTOMY       SECTION      TUBAL LIGATION       Social History     Social History    Marital status:      Spouse name: N/A    Number of children: N/A    Years of education: N/A     Occupational History    Not on file.     Social History Main Topics    Smoking status: Never Smoker    Smokeless tobacco: Never Used    Alcohol use No    Drug use: No    Sexual activity: No     Other Topics Concern    Not on file     Social History Narrative    No narrative on file     Family History   Problem Relation Age of Onset    COPD Mother      Heart disease Father     Cancer Sister     COPD Sister     COPD Brother        Current Outpatient Prescriptions:     albuterol (PROVENTIL) 5 mg/mL nebulizer solution, Take 0.5 mLs (2.5 mg total) by nebulization every 6 (six) hours as needed for Wheezing., Disp: 1 vial, Rfl: 11    betamethasone dipropionate (DIPROLENE) 0.05 % cream, Apply topically 2 (two) times daily., Disp: 60 g, Rfl: 1    budesonide (PULMICORT) 0.5 mg/2 mL nebulizer solution, Take 2 mLs (0.5 mg total) by nebulization 2 (two) times daily., Disp: 360 mL, Rfl: 3    calcium carbonate (OS-KASIA) 600 mg (1,500 mg) Tab, Take 600 mg by mouth once daily., Disp: , Rfl:     cholecalciferol, vitamin D3, (VITAMIN D3) 400 unit Chew, Take by mouth. Take  tues - thurs - sat, Disp: , Rfl:     citalopram (CELEXA) 40 MG tablet, TAKE 1 TABLET DAILY, Disp: 90 tablet, Rfl: 3    dasatinib (SPRYCEL) 50 MG tablet, Take 1 tablet (50 mg total) by mouth every other day., Disp: , Rfl:     docusate sodium (COLACE) 100 MG capsule, Take by mouth 2 (two) times daily., Disp: , Rfl:     doxycycline (VIBRA-TABS) 100 MG tablet, Take 1 tablet (100 mg total) by mouth 2 (two) times daily., Disp: 14 tablet, Rfl: 0    guaifenesin (MUCINEX) 600 mg 12 hr tablet, Take 1 tablet (600 mg total) by mouth 2 (two) times daily., Disp: 60 tablet, Rfl: 2    metoprolol succinate (TOPROL XL) 25 MG 24 hr tablet, Take 1 tablet (25 mg total) by mouth once daily., Disp: 30 tablet, Rfl: 6    predniSONE (DELTASONE) 20 MG tablet, Prednisone 60 mg/ day for 3 days, 40 mg/day for 3 days,20 mg/ day for 3 days, (1/2 tablet )10 mg a day for 3 days., Disp: 20 tablet, Rfl: 1    ranitidine (ZANTAC) 150 MG capsule, Take 1 capsule (150 mg total) by mouth 2 (two) times daily., Disp: 180 capsule, Rfl: 10    ranolazine (RANEXA) 500 MG Tb12, Take 1 tablet (500 mg total) by mouth 2 (two) times daily., Disp: 180 tablet, Rfl: 2    Review of patient's allergies indicates:   Allergen Reactions    Asmanex  twisthaler  [mometasone]      Other reaction(s): Rhinitis  Other reaction(s): Headache  Other reaction(s): Eye irritation    Aspirin      Other reaction(s): Unknown    Brovana  [arformoterol] Nausea Only     Other reaction(s): Headache  Other reaction(s): Dry Nose    Butisol  [butabarbital]      Other reaction(s): Unknown    Codeine      Other reaction(s): Unknown    Diazepam      Other reaction(s): Unknown    Iodine      Other reaction(s): Unknown    Limbitrol  [amitriptyline-chlordiazepoxide]      Other reaction(s): Unknown  Other reaction(s): Unknown    Medrol  [methylprednisolone]      Other reaction(s): Shortness of breath  Other reaction(s): Shortness of breath    Sulfa (sulfonamide antibiotics)      Other reaction(s): Unknown     Review of Systems   Negative.     Objective:     BP (!) 110/56 (BP Location: Right arm, Patient Position: Sitting, BP Method: Large (Automatic))   Pulse 70   Temp 97.6 °F (36.4 °C) (Tympanic)   SpO2 95%     Physical Exam  GEN: A&O fully, NAD, in wheelchair  PSYC: Normal affect  HEENT: Echymoses on forehead, echymoses in farhana-occular regions R>L with edema in right farhana-occular region (swollen shut, but no hemorrhage when propped open manually). OP: Clear, no LAD, no thyroid masses  CV: RRR, no M/G/R  PULM: CTA bilaterally, no wheezes, rales  NEURO: CN II-XII intact, 5/5 strength globally, no sensory losses  DERM: 3x3 cm superficial tear of skin; old gauze gently taken off, excess skin cut off, iodine used to clean wound, applied triple AB ointment, bandaged.    Lab Results   Component Value Date    WBC 7.86 08/19/2017    HGB 12.0 08/19/2017    HCT 36.5 (L) 08/19/2017     08/19/2017    CHOL 169 11/10/2015    TRIG 119 11/10/2015    HDL 55 11/10/2015    ALT 10 08/19/2017    AST 12 08/19/2017     08/19/2017    K 5.2 (H) 08/19/2017     08/19/2017    CREATININE 0.8 08/19/2017    BUN 31 (H) 08/19/2017    CO2 31 (H) 08/19/2017    TSH 1.771 12/08/2016    INR 1.0  08/19/2017    GLUF 103 05/03/2006       Assessment:   1. Left forearm skin tear: Wound cleaned, applied iodine and triple AB ointment, and bandaged. Spoke       with [facility] visiting nursing, who agreed to provide wound care tomorrow and moving forward.  2. Onychomychosis: Encouraged to apply eucalyptus from topical Lamisil, Vics vapor rub on toenails and to apply       Tea tree oil to her feet for the xerosis. If not improved over the coming month as above, will        consider oral Lamisil.     RTC scheduled for 9/5/17 or sooner as needed

## 2017-09-05 ENCOUNTER — OFFICE VISIT (OUTPATIENT)
Dept: INTERNAL MEDICINE | Facility: CLINIC | Age: 82
End: 2017-09-05
Payer: MEDICARE

## 2017-09-05 VITALS
DIASTOLIC BLOOD PRESSURE: 56 MMHG | OXYGEN SATURATION: 96 % | SYSTOLIC BLOOD PRESSURE: 116 MMHG | HEART RATE: 67 BPM | TEMPERATURE: 97 F

## 2017-09-05 DIAGNOSIS — B35.1 ONYCHOMYCOSIS: Primary | ICD-10-CM

## 2017-09-05 PROCEDURE — 1159F MED LIST DOCD IN RCRD: CPT | Mod: ,,, | Performed by: INTERNAL MEDICINE

## 2017-09-05 PROCEDURE — 1125F AMNT PAIN NOTED PAIN PRSNT: CPT | Mod: ,,, | Performed by: INTERNAL MEDICINE

## 2017-09-05 PROCEDURE — 99213 OFFICE O/P EST LOW 20 MIN: CPT | Mod: S$PBB,,, | Performed by: INTERNAL MEDICINE

## 2017-09-05 PROCEDURE — 99999 PR PBB SHADOW E&M-EST. PATIENT-LVL III: CPT | Mod: PBBFAC,,, | Performed by: INTERNAL MEDICINE

## 2017-09-05 PROCEDURE — 99213 OFFICE O/P EST LOW 20 MIN: CPT | Mod: PBBFAC,PN | Performed by: INTERNAL MEDICINE

## 2017-09-05 NOTE — PROGRESS NOTES
Subjective:      Patient ID: Carmela Maria is a 89 y.o. female.    Chief Complaint: No chief complaint on file.      HPI  Ms. Maria is a patient of mine, who presents for f/u on recent fall. She continues to heal and was able to obtain wound care for her left forearm. No further falls.     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 Unruly Ball    CHF (congestive heart failure)     Clotting disorder     COPD (chronic obstructive pulmonary disease)     Coronary artery disease     CVA (cerebral infarction) 2013    Depression     Fall 2013    Heart murmur     Hyperlipidemia     Osteoporosis     Palpitation 2015    Sleep apnea     Traumatic brain injury 2013    Ulcer      Past Surgical History:   Procedure Laterality Date    APPENDECTOMY       SECTION      TUBAL LIGATION       Social History     Social History    Marital status:      Spouse name: N/A    Number of children: N/A    Years of education: N/A     Occupational History    Not on file.     Social History Main Topics    Smoking status: Never Smoker    Smokeless tobacco: Never Used    Alcohol use No    Drug use: No    Sexual activity: No     Other Topics Concern    Not on file     Social History Narrative    No narrative on file     Family History   Problem Relation Age of Onset    COPD Mother     Heart disease Father     Cancer Sister     COPD Sister     COPD Brother        Current Outpatient Prescriptions:     albuterol (PROVENTIL) 5 mg/mL nebulizer solution, Take 0.5 mLs (2.5 mg total) by nebulization every 6 (six) hours as needed for Wheezing., Disp: 1 vial, Rfl: 11    betamethasone dipropionate (DIPROLENE) 0.05 % cream, Apply topically 2 (two) times daily., Disp: 60 g, Rfl: 1    budesonide (PULMICORT) 0.5 mg/2 mL nebulizer solution, Take 2 mLs  (0.5 mg total) by nebulization 2 (two) times daily., Disp: 360 mL, Rfl: 3    calcium carbonate (OS-KASIA) 600 mg (1,500 mg) Tab, Take 600 mg by mouth once daily., Disp: , Rfl:     cholecalciferol, vitamin D3, (VITAMIN D3) 400 unit Chew, Take by mouth. Take  tues - thurs - sat, Disp: , Rfl:     citalopram (CELEXA) 40 MG tablet, TAKE 1 TABLET DAILY, Disp: 90 tablet, Rfl: 3    dasatinib (SPRYCEL) 50 MG tablet, Take 1 tablet (50 mg total) by mouth every other day., Disp: , Rfl:     docusate sodium (COLACE) 100 MG capsule, Take by mouth 2 (two) times daily., Disp: , Rfl:     doxycycline (VIBRA-TABS) 100 MG tablet, Take 1 tablet (100 mg total) by mouth 2 (two) times daily., Disp: 14 tablet, Rfl: 0    guaifenesin (MUCINEX) 600 mg 12 hr tablet, Take 1 tablet (600 mg total) by mouth 2 (two) times daily., Disp: 60 tablet, Rfl: 2    metoprolol succinate (TOPROL XL) 25 MG 24 hr tablet, Take 1 tablet (25 mg total) by mouth once daily., Disp: 30 tablet, Rfl: 6    predniSONE (DELTASONE) 20 MG tablet, Prednisone 60 mg/ day for 3 days, 40 mg/day for 3 days,20 mg/ day for 3 days, (1/2 tablet )10 mg a day for 3 days., Disp: 20 tablet, Rfl: 1    ranitidine (ZANTAC) 150 MG capsule, Take 1 capsule (150 mg total) by mouth 2 (two) times daily., Disp: 180 capsule, Rfl: 10    ranolazine (RANEXA) 500 MG Tb12, Take 1 tablet (500 mg total) by mouth 2 (two) times daily., Disp: 180 tablet, Rfl: 2    Review of patient's allergies indicates:   Allergen Reactions    Asmanex twisthaler  [mometasone]      Other reaction(s): Rhinitis  Other reaction(s): Headache  Other reaction(s): Eye irritation    Aspirin      Other reaction(s): Unknown    Brovana  [arformoterol] Nausea Only     Other reaction(s): Headache  Other reaction(s): Dry Nose    Butisol  [butabarbital]      Other reaction(s): Unknown    Codeine      Other reaction(s): Unknown    Diazepam      Other reaction(s): Unknown    Iodine      Other reaction(s): Unknown    Limbitrol   [amitriptyline-chlordiazepoxide]      Other reaction(s): Unknown  Other reaction(s): Unknown    Medrol  [methylprednisolone]      Other reaction(s): Shortness of breath  Other reaction(s): Shortness of breath    Sulfa (sulfonamide antibiotics)      Other reaction(s): Unknown     Review of Systems   Negative.     Objective:     BP (!) 116/56 (BP Location: Right arm, Patient Position: Sitting, BP Method: Large (Automatic))   Pulse 67   Temp 97.2 °F (36.2 °C) (Tympanic)   SpO2 96%     Physical Exam  GEN: A&O fully, NAD  PSYC: Normal affect  HEENT: OP: Clear, no LAD  DERM: Resolving echymoses from forehead, now down to cervical region  FEET: +thick, yellow toe nails; right 1st tarsal with excess dry skin on medial side    Lab Results   Component Value Date    WBC 7.86 08/19/2017    HGB 12.0 08/19/2017    HCT 36.5 (L) 08/19/2017     08/19/2017    CHOL 169 11/10/2015    TRIG 119 11/10/2015    HDL 55 11/10/2015    ALT 10 08/19/2017    AST 12 08/19/2017     08/19/2017    K 5.2 (H) 08/19/2017     08/19/2017    CREATININE 0.8 08/19/2017    BUN 31 (H) 08/19/2017    CO2 31 (H) 08/19/2017    TSH 1.771 12/08/2016    INR 1.0 08/19/2017    GLUF 103 05/03/2006       Assessment:      1. Onychomycosis: Recommended Tea tree oil and eucalypsus (Khadar's vapor rub) to feet BID, which both patient and her son are amenable to. If not effective with consider Lamisil.     Plan:   Onychomycosis  -     Ambulatory consult to Podiatry      RTC in 6 months RE anemia or sooner as needed

## 2017-10-09 ENCOUNTER — OFFICE VISIT (OUTPATIENT)
Dept: INTERNAL MEDICINE | Facility: CLINIC | Age: 82
End: 2017-10-09
Payer: MEDICARE

## 2017-10-09 VITALS
HEIGHT: 59 IN | DIASTOLIC BLOOD PRESSURE: 59 MMHG | OXYGEN SATURATION: 96 % | RESPIRATION RATE: 18 BRPM | HEART RATE: 62 BPM | TEMPERATURE: 97 F | BODY MASS INDEX: 29.73 KG/M2 | WEIGHT: 147.5 LBS | SYSTOLIC BLOOD PRESSURE: 121 MMHG

## 2017-10-09 DIAGNOSIS — G47.00 INSOMNIA, UNSPECIFIED TYPE: ICD-10-CM

## 2017-10-09 DIAGNOSIS — F41.9 ANXIETY: Primary | ICD-10-CM

## 2017-10-09 PROCEDURE — 99214 OFFICE O/P EST MOD 30 MIN: CPT | Mod: S$PBB,,, | Performed by: INTERNAL MEDICINE

## 2017-10-09 PROCEDURE — G0008 ADMIN INFLUENZA VIRUS VAC: HCPCS | Mod: PBBFAC,PN

## 2017-10-09 PROCEDURE — 99213 OFFICE O/P EST LOW 20 MIN: CPT | Mod: PBBFAC,PN,25 | Performed by: INTERNAL MEDICINE

## 2017-10-09 PROCEDURE — 99999 PR PBB SHADOW E&M-EST. PATIENT-LVL III: CPT | Mod: PBBFAC,,, | Performed by: INTERNAL MEDICINE

## 2017-10-09 RX ORDER — HYDROXYZINE PAMOATE 25 MG/1
25 CAPSULE ORAL NIGHTLY PRN
Qty: 30 CAPSULE | Refills: 0 | Status: SHIPPED | OUTPATIENT
Start: 2017-10-09 | End: 2018-06-28 | Stop reason: SINTOL

## 2017-10-09 NOTE — PROGRESS NOTES
"Subjective:      Patient ID: Carmela Maria is a 89 y.o. female.    Chief Complaint: Follow-up (3 months**) and Fall      Fall   Pertinent negatives include no headaches, hematuria or vomiting.     Mrs. Maria is a patient of mine, who presents for f/u on fall. She reports no further falls. She reports improvement in her bruising.     She reports now checking the floor and turning on the light prior to walking down the [place]donado at home.     She endorses her usual weakness, cp.    She reports an exacerbation in her constipation, for which she ran out of her "powder", but doesn't remember the name. Last BM was last night.     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 Unruly Ball    CHF (congestive heart failure)     Clotting disorder     COPD (chronic obstructive pulmonary disease)     Coronary artery disease     CVA (cerebral infarction) 2013    Depression     Fall 2013    Heart murmur     Hyperlipidemia     Osteoporosis     Palpitation 2015    Sleep apnea     Traumatic brain injury 2013    Ulcer      Past Surgical History:   Procedure Laterality Date    APPENDECTOMY       SECTION      TUBAL LIGATION       Social History     Social History    Marital status:      Spouse name: N/A    Number of children: N/A    Years of education: N/A     Occupational History    Not on file.     Social History Main Topics    Smoking status: Never Smoker    Smokeless tobacco: Never Used    Alcohol use No    Drug use: No    Sexual activity: No     Other Topics Concern    Not on file     Social History Narrative    No narrative on file     Family History   Problem Relation Age of Onset    COPD Mother     Heart disease Father     Cancer Sister     COPD Sister     COPD Brother        Current Outpatient Prescriptions:     albuterol " (PROVENTIL) 5 mg/mL nebulizer solution, Take 0.5 mLs (2.5 mg total) by nebulization every 6 (six) hours as needed for Wheezing., Disp: 1 vial, Rfl: 11    betamethasone dipropionate (DIPROLENE) 0.05 % cream, Apply topically 2 (two) times daily., Disp: 60 g, Rfl: 1    budesonide (PULMICORT) 0.5 mg/2 mL nebulizer solution, Take 2 mLs (0.5 mg total) by nebulization 2 (two) times daily., Disp: 360 mL, Rfl: 3    calcium carbonate (OS-KASIA) 600 mg (1,500 mg) Tab, Take 600 mg by mouth once daily., Disp: , Rfl:     cholecalciferol, vitamin D3, (VITAMIN D3) 400 unit Chew, Take by mouth. Take  tues - thurs - sat, Disp: , Rfl:     citalopram (CELEXA) 40 MG tablet, TAKE 1 TABLET DAILY, Disp: 90 tablet, Rfl: 3    dasatinib (SPRYCEL) 50 MG tablet, Take 1 tablet (50 mg total) by mouth every other day., Disp: , Rfl:     guaifenesin (MUCINEX) 600 mg 12 hr tablet, Take 1 tablet (600 mg total) by mouth 2 (two) times daily., Disp: 60 tablet, Rfl: 2    metoprolol succinate (TOPROL XL) 25 MG 24 hr tablet, Take 1 tablet (25 mg total) by mouth once daily., Disp: 30 tablet, Rfl: 6    ranitidine (ZANTAC) 150 MG capsule, Take 1 capsule (150 mg total) by mouth 2 (two) times daily., Disp: 180 capsule, Rfl: 10    ranolazine (RANEXA) 500 MG Tb12, Take 1 tablet (500 mg total) by mouth 2 (two) times daily., Disp: 180 tablet, Rfl: 2    hydrOXYzine pamoate (VISTARIL) 25 MG Cap, Take 1 capsule (25 mg total) by mouth nightly as needed (insomnia or anxiety)., Disp: 30 capsule, Rfl: 0    Review of patient's allergies indicates:   Allergen Reactions    Asmanex twisthaler  [mometasone]      Other reaction(s): Rhinitis  Other reaction(s): Headache  Other reaction(s): Eye irritation    Aspirin      Other reaction(s): Unknown    Brovana  [arformoterol] Nausea Only     Other reaction(s): Headache  Other reaction(s): Dry Nose    Butisol  [butabarbital]      Other reaction(s): Unknown    Codeine      Other reaction(s): Unknown    Diazepam       "Other reaction(s): Unknown    Iodine      Other reaction(s): Unknown    Limbitrol  [amitriptyline-chlordiazepoxide]      Other reaction(s): Unknown  Other reaction(s): Unknown    Medrol  [methylprednisolone]      Other reaction(s): Shortness of breath  Other reaction(s): Shortness of breath    Sulfa (sulfonamide antibiotics)      Other reaction(s): Unknown     Review of Systems   Constitutional: Negative for activity change and unexpected weight change.   HENT: Positive for hearing loss. Negative for rhinorrhea and trouble swallowing.    Eyes: Negative for discharge and visual disturbance.   Respiratory: Negative for chest tightness and wheezing.    Cardiovascular: Positive for chest pain and palpitations.   Gastrointestinal: Positive for constipation. Negative for blood in stool, diarrhea and vomiting.   Endocrine: Negative for polydipsia and polyuria.   Genitourinary: Negative for difficulty urinating, dysuria, hematuria and menstrual problem.   Musculoskeletal: Positive for arthralgias. Negative for joint swelling and neck pain.   Neurological: Positive for weakness. Negative for headaches.   Psychiatric/Behavioral: Positive for confusion and dysphoric mood.      Constitutional: Negative.   Negative.     Objective:     BP (!) 121/59 (BP Location: Left arm, Patient Position: Sitting, BP Method: Medium (Automatic))   Pulse 62   Temp 96.5 °F (35.8 °C) (Tympanic)   Resp 18   Ht 4' 11" (1.499 m)   Wt 66.9 kg (147 lb 7.8 oz)   SpO2 96%   BMI 29.79 kg/m²     Physical Exam  GEN: A&O fully, NAD  PSYC: Normal affect  DERM: Well healed left forearm scab, no pus; mild echymoses underneath orbits and on forearm, which is much improved compared to last visit    Lab Results   Component Value Date    WBC 7.86 08/19/2017    HGB 12.0 08/19/2017    HCT 36.5 (L) 08/19/2017     08/19/2017    CHOL 169 11/10/2015    TRIG 119 11/10/2015    HDL 55 11/10/2015    ALT 10 08/19/2017    AST 12 08/19/2017     08/19/2017 "    K 5.2 (H) 08/19/2017     08/19/2017    CREATININE 0.8 08/19/2017    BUN 31 (H) 08/19/2017    CO2 31 (H) 08/19/2017    TSH 1.771 12/08/2016    INR 1.0 08/19/2017    GLUF 103 05/03/2006       Assessment:      1. Anxiety: Risks and benefits discussed and patient chose to move forward with hydroxyzine 25 mg 1 po qhs prn.    2. Insomnia, unspecified type: As above.   3.      Constipation: She prefers to take the powdered medication that her  had prescribed, which            she will provide name of asap. Declined colace.     Plan:   Anxiety  -     hydrOXYzine pamoate (VISTARIL) 25 MG Cap; Take 1 capsule (25 mg total) by mouth nightly as needed (insomnia or anxiety).  Dispense: 30 capsule; Refill: 0    Insomnia, unspecified type  -     hydrOXYzine pamoate (VISTARIL) 25 MG Cap; Take 1 capsule (25 mg total) by mouth nightly as needed (insomnia or anxiety).  Dispense: 30 capsule; Refill: 0      RTC in 3 months RE anxiety or sooner as needed

## 2017-10-20 ENCOUNTER — TELEPHONE (OUTPATIENT)
Dept: INTERNAL MEDICINE | Facility: CLINIC | Age: 82
End: 2017-10-20

## 2017-10-20 NOTE — TELEPHONE ENCOUNTER
Spoke with son, he stated pt does not need an appt. He stated pt was only complaining of it for a few minutes and then said nevermind. Let son know to call us back if needed.

## 2017-10-20 NOTE — TELEPHONE ENCOUNTER
----- Message from Nae Lockhart sent at 10/20/2017  9:24 AM CDT -----  Contact: pt  Calling to be worked into the schedule with Lt foot pain. Please advise [phone] or [phone]

## 2017-11-19 DIAGNOSIS — J44.9 CHRONIC OBSTRUCTIVE PULMONARY DISEASE, UNSPECIFIED COPD TYPE: ICD-10-CM

## 2017-11-19 RX ORDER — IPRATROPIUM BROMIDE AND ALBUTEROL SULFATE 2.5; .5 MG/3ML; MG/3ML
SOLUTION RESPIRATORY (INHALATION)
Qty: 1080 ML | Refills: 3 | Status: SHIPPED | OUTPATIENT
Start: 2017-11-19 | End: 2018-02-21 | Stop reason: SDUPTHER

## 2018-02-13 ENCOUNTER — OFFICE VISIT (OUTPATIENT)
Dept: INTERNAL MEDICINE | Facility: CLINIC | Age: 83
End: 2018-02-13
Payer: MEDICARE

## 2018-02-13 VITALS
BODY MASS INDEX: 27.96 KG/M2 | DIASTOLIC BLOOD PRESSURE: 59 MMHG | HEIGHT: 59 IN | TEMPERATURE: 98 F | RESPIRATION RATE: 18 BRPM | SYSTOLIC BLOOD PRESSURE: 128 MMHG | HEART RATE: 83 BPM | WEIGHT: 138.69 LBS | OXYGEN SATURATION: 96 %

## 2018-02-13 DIAGNOSIS — L60.8 DISCOLORATION AND THICKENING OF NAILS BOTH FEET: ICD-10-CM

## 2018-02-13 DIAGNOSIS — F33.1 DEPRESSION, MAJOR, RECURRENT, MODERATE: ICD-10-CM

## 2018-02-13 DIAGNOSIS — J44.9 CHRONIC OBSTRUCTIVE PULMONARY DISEASE, UNSPECIFIED COPD TYPE: ICD-10-CM

## 2018-02-13 DIAGNOSIS — C92.10 CML (CHRONIC MYELOCYTIC LEUKEMIA): ICD-10-CM

## 2018-02-13 DIAGNOSIS — B35.1 ONYCHOMYCOSIS: Primary | ICD-10-CM

## 2018-02-13 PROCEDURE — 1159F MED LIST DOCD IN RCRD: CPT | Mod: ,,, | Performed by: INTERNAL MEDICINE

## 2018-02-13 PROCEDURE — 99213 OFFICE O/P EST LOW 20 MIN: CPT | Mod: S$PBB,,, | Performed by: INTERNAL MEDICINE

## 2018-02-13 PROCEDURE — 99214 OFFICE O/P EST MOD 30 MIN: CPT | Mod: PBBFAC,PN | Performed by: INTERNAL MEDICINE

## 2018-02-13 PROCEDURE — 1126F AMNT PAIN NOTED NONE PRSNT: CPT | Mod: ,,, | Performed by: INTERNAL MEDICINE

## 2018-02-13 PROCEDURE — 99999 PR PBB SHADOW E&M-EST. PATIENT-LVL IV: CPT | Mod: PBBFAC,,, | Performed by: INTERNAL MEDICINE

## 2018-02-13 RX ORDER — TERBINAFINE HYDROCHLORIDE 250 MG/1
250 TABLET ORAL DAILY
Qty: 30 TABLET | Refills: 2 | Status: SHIPPED | OUTPATIENT
Start: 2018-02-13 | End: 2018-02-26 | Stop reason: SINTOL

## 2018-02-13 NOTE — PROGRESS NOTES
Subjective:      Patient ID: Carmela Maria is a 89 y.o. female.    Chief Complaint: Bleeding/Bruising (on (R) foot**)      HPI     Ms. Carmela Maria is a patient of Aden Jones MD, who presents for right foot discoloration. She reports having red discoloration of both of her feet but some dark blue discoloration on the top of her right foot over the past 3 months, but doesn't remember any trauma to the right foot. She endorses red discoloration and thick toe nails of both feet over the past several years.      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 nUruly Ball    CHF (congestive heart failure)     Clotting disorder     COPD (chronic obstructive pulmonary disease)     Coronary artery disease     CVA (cerebral infarction) 2013    Depression     Fall 2013    Heart murmur     Hyperlipidemia     Osteoporosis     Palpitation 2015    Sleep apnea     Traumatic brain injury 2013    Ulcer      Past Surgical History:   Procedure Laterality Date    APPENDECTOMY       SECTION      TUBAL LIGATION       Social History     Social History    Marital status:      Spouse name: N/A    Number of children: N/A    Years of education: N/A     Occupational History    Not on file.     Social History Main Topics    Smoking status: Never Smoker    Smokeless tobacco: Never Used    Alcohol use No    Drug use: No    Sexual activity: No     Other Topics Concern    Not on file     Social History Narrative    No narrative on file     Family History   Problem Relation Age of Onset    COPD Mother     Heart disease Father     Cancer Sister     COPD Sister     COPD Brother        Current Outpatient Prescriptions:     albuterol (PROVENTIL) 5 mg/mL nebulizer solution, Take 0.5 mLs (2.5 mg total) by nebulization every 6 (six) hours as  needed for Wheezing., Disp: 1 vial, Rfl: 11    albuterol-ipratropium 2.5mg-0.5mg/3mL (DUO-NEB) 0.5 mg-3 mg(2.5 mg base)/3 mL nebulizer solution, USE 1 VIAL VIA NEBULIZER EVERY 6 HOURS WHILE AWAKE, Disp: 1080 mL, Rfl: 3    betamethasone dipropionate (DIPROLENE) 0.05 % cream, Apply topically 2 (two) times daily., Disp: 60 g, Rfl: 1    budesonide (PULMICORT) 0.5 mg/2 mL nebulizer solution, Take 2 mLs (0.5 mg total) by nebulization 2 (two) times daily., Disp: 360 mL, Rfl: 3    calcium carbonate (OS-KASIA) 600 mg (1,500 mg) Tab, Take 600 mg by mouth once daily., Disp: , Rfl:     cholecalciferol, vitamin D3, (VITAMIN D3) 400 unit Chew, Take by mouth. Take  tues - thurs - sat, Disp: , Rfl:     citalopram (CELEXA) 40 MG tablet, TAKE 1 TABLET DAILY, Disp: 90 tablet, Rfl: 3    dasatinib (SPRYCEL) 50 MG tablet, Take 1 tablet (50 mg total) by mouth every other day., Disp: , Rfl:     guaifenesin (MUCINEX) 600 mg 12 hr tablet, Take 1 tablet (600 mg total) by mouth 2 (two) times daily., Disp: 60 tablet, Rfl: 2    hydrOXYzine pamoate (VISTARIL) 25 MG Cap, Take 1 capsule (25 mg total) by mouth nightly as needed (insomnia or anxiety)., Disp: 30 capsule, Rfl: 0    metoprolol succinate (TOPROL XL) 25 MG 24 hr tablet, Take 1 tablet (25 mg total) by mouth once daily., Disp: 30 tablet, Rfl: 6    ranitidine (ZANTAC) 150 MG capsule, Take 1 capsule (150 mg total) by mouth 2 (two) times daily., Disp: 180 capsule, Rfl: 10    ranolazine (RANEXA) 500 MG Tb12, Take 1 tablet (500 mg total) by mouth 2 (two) times daily., Disp: 180 tablet, Rfl: 2    terbinafine HCl (LAMISIL) 250 mg tablet, Take 1 tablet (250 mg total) by mouth once daily., Disp: 30 tablet, Rfl: 2    Review of patient's allergies indicates:   Allergen Reactions    Asmanex twisthaler  [mometasone]      Other reaction(s): Rhinitis  Other reaction(s): Headache  Other reaction(s): Eye irritation    Aspirin      Other reaction(s): Unknown    Brovana  [arformoterol] Nausea  "Only     Other reaction(s): Headache  Other reaction(s): Dry Nose    Butisol  [butabarbital]      Other reaction(s): Unknown    Codeine      Other reaction(s): Unknown    Diazepam      Other reaction(s): Unknown    Iodine      Other reaction(s): Unknown    Limbitrol  [amitriptyline-chlordiazepoxide]      Other reaction(s): Unknown  Other reaction(s): Unknown    Medrol  [methylprednisolone]      Other reaction(s): Shortness of breath  Other reaction(s): Shortness of breath    Sulfa (sulfonamide antibiotics)      Other reaction(s): Unknown        Review of Systems   Negative    Objective:     BP (!) 128/59 (BP Location: Left arm, Patient Position: Sitting, BP Method: Large (Automatic))   Pulse 83   Temp 97.7 °F (36.5 °C) (Tympanic)   Resp 18   Ht 4' 11" (1.499 m)   Wt 62.9 kg (138 lb 10.7 oz)   SpO2 96%   BMI 28.01 kg/m²     Physical Exam  GEN: A&O fully, NAD  PSYC: Normal affect  DERM: Bilateral feet with thick, darkened toenails and erythema and scale on lateral portions of feet and sole of feet; 3 1x1 cm regions of blue discoloration of right dorsal foot with echymotic appearance       Lab Results   Component Value Date    WBC 7.86 08/19/2017    HGB 12.0 08/19/2017    HCT 36.5 (L) 08/19/2017     08/19/2017    CHOL 169 11/10/2015    TRIG 119 11/10/2015    HDL 55 11/10/2015    LDLCALC 90.2 11/10/2015    ALT 10 08/19/2017    AST 12 08/19/2017     08/19/2017    K 5.2 (H) 08/19/2017     08/19/2017    CREATININE 0.8 08/19/2017    BUN 31 (H) 08/19/2017    CO2 31 (H) 08/19/2017    TSH 1.771 12/08/2016    INR 1.0 08/19/2017    GLUF 103 05/03/2006       Assessment:      1. Onychomycosis: Tried conservative tx with Vics, etc. With no improvement. Risks and benefits discussed and patient chose to move forward with Lamisil oral 250 mg po 1 qd for 3 months if her blood counts and LFTs allow.   2. Chronic obstructive pulmonary disease, unspecified COPD type: Stable.   3. CML (chronic myelocytic " leukemia): Stable.   4. Depression, major, recurrent, moderate: Stable.   5. Discoloration and thickening of nails both feet: Likely echymoses. Will refer to podiatry for further evaluation and management of her onychomycoses.        Plan:   Onychomycosis  -     Cancel: CBC auto differential; Future; Expected date: 02/13/2018  -     Cancel: Comprehensive metabolic panel; Future; Expected date: 02/13/2018  -     CBC auto differential; Future; Expected date: 02/20/2018  -     Comprehensive metabolic panel; Future; Expected date: 02/20/2018    Chronic obstructive pulmonary disease, unspecified COPD type    CML (chronic myelocytic leukemia)    Depression, major, recurrent, moderate    Discoloration and thickening of nails both feet  -     Ambulatory consult to Podiatry    Other orders  -     terbinafine HCl (LAMISIL) 250 mg tablet; Take 1 tablet (250 mg total) by mouth once daily.  Dispense: 30 tablet; Refill: 2        Follow-up in about 4 weeks (around 3/13/2018), or if symptoms worsen or fail to improve, for FU on onychomosis.

## 2018-02-16 ENCOUNTER — PATIENT MESSAGE (OUTPATIENT)
Dept: PODIATRY | Facility: CLINIC | Age: 83
End: 2018-02-16

## 2018-02-16 DIAGNOSIS — J44.9 CHRONIC OBSTRUCTIVE PULMONARY DISEASE, UNSPECIFIED COPD TYPE: Primary | ICD-10-CM

## 2018-02-21 ENCOUNTER — OFFICE VISIT (OUTPATIENT)
Dept: PULMONOLOGY | Facility: CLINIC | Age: 83
End: 2018-02-21
Payer: MEDICARE

## 2018-02-21 ENCOUNTER — PROCEDURE VISIT (OUTPATIENT)
Dept: PULMONOLOGY | Facility: CLINIC | Age: 83
End: 2018-02-21
Payer: MEDICARE

## 2018-02-21 ENCOUNTER — HOSPITAL ENCOUNTER (OUTPATIENT)
Dept: RADIOLOGY | Facility: HOSPITAL | Age: 83
Discharge: HOME | End: 2018-02-21
Attending: INTERNAL MEDICINE
Payer: MEDICARE

## 2018-02-21 ENCOUNTER — OFFICE VISIT (OUTPATIENT)
Dept: PODIATRY | Facility: CLINIC | Age: 83
End: 2018-02-21
Payer: MEDICARE

## 2018-02-21 VITALS
RESPIRATION RATE: 17 BRPM | BODY MASS INDEX: 27.82 KG/M2 | DIASTOLIC BLOOD PRESSURE: 62 MMHG | HEIGHT: 59 IN | SYSTOLIC BLOOD PRESSURE: 122 MMHG | WEIGHT: 138 LBS | OXYGEN SATURATION: 95 % | HEART RATE: 58 BPM

## 2018-02-21 VITALS
SYSTOLIC BLOOD PRESSURE: 145 MMHG | HEIGHT: 59 IN | HEART RATE: 63 BPM | WEIGHT: 138.44 LBS | DIASTOLIC BLOOD PRESSURE: 58 MMHG | BODY MASS INDEX: 27.91 KG/M2

## 2018-02-21 DIAGNOSIS — J44.9 CHRONIC OBSTRUCTIVE PULMONARY DISEASE, UNSPECIFIED COPD TYPE: ICD-10-CM

## 2018-02-21 DIAGNOSIS — B35.1 DERMATOPHYTOSIS OF NAIL: Primary | ICD-10-CM

## 2018-02-21 DIAGNOSIS — R60.9 EDEMA, UNSPECIFIED TYPE: ICD-10-CM

## 2018-02-21 DIAGNOSIS — J44.9 CHRONIC OBSTRUCTIVE PULMONARY DISEASE, UNSPECIFIED COPD TYPE: Primary | ICD-10-CM

## 2018-02-21 LAB
POST FEF 25 75: 0.41 L/S (ref 0.02–1.08)
POST FET 100: 6.64 S
POST FEV1 FVC: 66 %
POST FEV1: 0.71 L (ref 0.84–1.33)
POST FIF 50: 0.81 L/S
POST FVC: 1.08 L (ref 1.22–1.81)
POST PEF: 1.37 L/S (ref 2.16–3.61)
PRE FEF 25 75: 0.35 L/S (ref 0.02–1.08)
PRE FET 100: 11.21 S
PRE FEV1 FVC: 65 %
PRE FEV1: 0.71 L (ref 0.84–1.33)
PRE FIF 50: 0.83 L/S
PRE FVC: 1.09 L (ref 1.22–1.81)
PRE PEF: 1.45 L/S (ref 2.16–3.61)
PREDICTED FEV1 FVC: 71.47 % (ref 66.57–76.37)
PREDICTED FEV1: 1.08 L (ref 0.84–1.33)
PREDICTED FVC: 1.52 L (ref 1.22–1.81)
PROVOCATION PROTOCOL: ABNORMAL

## 2018-02-21 PROCEDURE — 99999 PR PBB SHADOW E&M-EST. PATIENT-LVL III: CPT | Mod: PBBFAC,,, | Performed by: PODIATRIST

## 2018-02-21 PROCEDURE — 94060 EVALUATION OF WHEEZING: CPT | Mod: 26,S$PBB,, | Performed by: INTERNAL MEDICINE

## 2018-02-21 PROCEDURE — 99215 OFFICE O/P EST HI 40 MIN: CPT | Mod: 25,S$PBB,, | Performed by: INTERNAL MEDICINE

## 2018-02-21 PROCEDURE — 99999 PR PBB SHADOW E&M-EST. PATIENT-LVL III: CPT | Mod: PBBFAC,,, | Performed by: INTERNAL MEDICINE

## 2018-02-21 PROCEDURE — 99213 OFFICE O/P EST LOW 20 MIN: CPT | Mod: PBBFAC,PO,25 | Performed by: PODIATRIST

## 2018-02-21 PROCEDURE — 71046 X-RAY EXAM CHEST 2 VIEWS: CPT | Mod: 26,,, | Performed by: RADIOLOGY

## 2018-02-21 PROCEDURE — 99213 OFFICE O/P EST LOW 20 MIN: CPT | Mod: PBBFAC,27,PO | Performed by: INTERNAL MEDICINE

## 2018-02-21 PROCEDURE — 1159F MED LIST DOCD IN RCRD: CPT | Mod: ,,, | Performed by: PODIATRIST

## 2018-02-21 PROCEDURE — 99203 OFFICE O/P NEW LOW 30 MIN: CPT | Mod: S$PBB,,, | Performed by: PODIATRIST

## 2018-02-21 PROCEDURE — 71046 X-RAY EXAM CHEST 2 VIEWS: CPT | Mod: TC,FY,PO

## 2018-02-21 PROCEDURE — 1126F AMNT PAIN NOTED NONE PRSNT: CPT | Mod: ,,, | Performed by: INTERNAL MEDICINE

## 2018-02-21 PROCEDURE — 94060 EVALUATION OF WHEEZING: CPT | Mod: PBBFAC,PO

## 2018-02-21 PROCEDURE — 1126F AMNT PAIN NOTED NONE PRSNT: CPT | Mod: ,,, | Performed by: PODIATRIST

## 2018-02-21 PROCEDURE — 1159F MED LIST DOCD IN RCRD: CPT | Mod: ,,, | Performed by: INTERNAL MEDICINE

## 2018-02-21 RX ORDER — ALBUTEROL SULFATE 90 UG/1
2 AEROSOL, METERED RESPIRATORY (INHALATION) EVERY 6 HOURS
Qty: 1 INHALER | Refills: 12 | Status: SHIPPED | OUTPATIENT
Start: 2018-02-21 | End: 2018-07-13 | Stop reason: SDUPTHER

## 2018-02-21 RX ORDER — FUROSEMIDE 20 MG/1
20 TABLET ORAL 2 TIMES DAILY
Qty: 180 TABLET | Refills: 3 | Status: ON HOLD | OUTPATIENT
Start: 2018-02-21 | End: 2018-10-16 | Stop reason: HOSPADM

## 2018-02-21 RX ORDER — IPRATROPIUM BROMIDE AND ALBUTEROL SULFATE 2.5; .5 MG/3ML; MG/3ML
SOLUTION RESPIRATORY (INHALATION)
Qty: 1080 ML | Refills: 3 | Status: SHIPPED | OUTPATIENT
Start: 2018-02-21 | End: 2018-07-13 | Stop reason: SDUPTHER

## 2018-02-21 RX ORDER — CICLOPIROX 80 MG/ML
SOLUTION TOPICAL
Qty: 1 BOTTLE | Refills: 10 | Status: SHIPPED | OUTPATIENT
Start: 2018-02-21 | End: 2018-07-19

## 2018-02-21 RX ORDER — BUDESONIDE 0.5 MG/2ML
0.5 INHALANT ORAL 2 TIMES DAILY
Qty: 360 ML | Refills: 3 | Status: SHIPPED | OUTPATIENT
Start: 2018-02-21

## 2018-02-21 NOTE — PROGRESS NOTES
Subjective:       Patient ID: Carmela Maria is a 89 y.o. female.    Chief Complaint: She       COPD    HPI     Tires easily  shortness of breath  Dyspnea  Patient complains of shortness of breath. Symptoms occur while getting dressed. Symptoms began 10 years ago, gradually worsening since. Associated symptoms include  dyspnea on exertion and shortness of breath. She denies chest pain, located left chest. She does not have had recent travel. Weight has been stable. Symptoms are exacerbated by any exercise. Symptoms are alleviated by rest.     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 Unruly Ball    CHF (congestive heart failure)     Clotting disorder     COPD (chronic obstructive pulmonary disease)     Coronary artery disease     CVA (cerebral infarction) 2013    Depression     Fall 2013    Heart murmur     Hyperlipidemia     Osteoporosis     Palpitation 2015    Sleep apnea     Traumatic brain injury 2013    Ulcer      Past Surgical History:   Procedure Laterality Date    APPENDECTOMY       SECTION      TUBAL LIGATION       Social History     Social History    Marital status:      Spouse name: N/A    Number of children: N/A    Years of education: N/A     Occupational History    Not on file.     Social History Main Topics    Smoking status: Never Smoker    Smokeless tobacco: Never Used    Alcohol use No    Drug use: No    Sexual activity: No     Other Topics Concern    Not on file     Social History Narrative    No narrative on file     Review of Systems   Constitutional: Positive for fatigue. Negative for fever.   HENT: Positive for postnasal drip, rhinorrhea and congestion.    Eyes: Negative for redness and itching.   Respiratory: Positive for cough, sputum production, shortness of breath, dyspnea on  extertion, use of rescue inhaler and Paroxysmal Nocturnal Dyspnea.    Cardiovascular: Negative for chest pain, palpitations and leg swelling.   Genitourinary: Negative for difficulty urinating and hematuria.   Endocrine: Negative for cold intolerance and heat intolerance.    Skin: Negative for rash.   Gastrointestinal: Negative for nausea and abdominal pain.   Neurological: Negative for dizziness, syncope, weakness and light-headedness.   Hematological: Negative for adenopathy. Does not bruise/bleed easily.   Psychiatric/Behavioral: Negative for sleep disturbance. The patient is not nervous/anxious.        Objective:      Physical Exam   Constitutional: She is oriented to person, place, and time.   Chronically ill appearing     HENT:   Head: Normocephalic and atraumatic.   Mouth/Throat: Oropharyngeal exudate present.   Eyes: Conjunctivae are normal. Pupils are equal, round, and reactive to light.   Neck: Neck supple. No JVD present. No tracheal deviation present. No thyromegaly present.   Cardiovascular: Normal rate.  An irregular rhythm present.   Murmur heard.   Systolic murmur is present with a grade of 2/6   Pulmonary/Chest: Effort normal. No respiratory distress. She has decreased breath sounds. She has wheezes in the right lower field and the left lower field. She has no rhonchi. She has no rales. She exhibits no tenderness.   Abdominal: Soft. Bowel sounds are normal.   Musculoskeletal: Normal range of motion. She exhibits no edema.   Lymphadenopathy:     She has no cervical adenopathy.   Neurological: She is alert and oriented to person, place, and time.   Skin: Skin is warm and dry.   Nursing note and vitals reviewed.    Personal Diagnostic Review  Chest x-ray: emphysema    No flowsheet data found.      Assessment:       1. Chronic obstructive pulmonary disease, unspecified COPD type    2. Edema, unspecified type        Outpatient Encounter Prescriptions as of 2/21/2018   Medication Sig Dispense Refill     albuterol 90 mcg/actuation inhaler Inhale 2 puffs into the lungs every 6 (six) hours. 1 Inhaler 12    calcium carbonate (OS-KASIA) 600 mg (1,500 mg) Tab Take 600 mg by mouth once daily.      cholecalciferol, vitamin D3, (VITAMIN D3) 400 unit Chew Take by mouth. Take  tues - thurs - sat      ciclopirox (PENLAC) 8 % Soln Apply to affected toenails at night time DAILY. On 7th day, file nails down, clean all nails with alcohol and restart application process. 1 Bottle 10    citalopram (CELEXA) 40 MG tablet TAKE 1 TABLET DAILY 90 tablet 3    dasatinib (SPRYCEL) 50 MG tablet Take 1 tablet (50 mg total) by mouth every other day.      furosemide (LASIX) 20 MG tablet Take 1 tablet (20 mg total) by mouth 2 (two) times daily. 180 tablet 3    guaifenesin (MUCINEX) 600 mg 12 hr tablet Take 1 tablet (600 mg total) by mouth 2 (two) times daily. 60 tablet 2    hydrOXYzine pamoate (VISTARIL) 25 MG Cap Take 1 capsule (25 mg total) by mouth nightly as needed (insomnia or anxiety). 30 capsule 0    metoprolol succinate (TOPROL XL) 25 MG 24 hr tablet Take 1 tablet (25 mg total) by mouth once daily. 30 tablet 6    ranitidine (ZANTAC) 150 MG capsule Take 1 capsule (150 mg total) by mouth 2 (two) times daily. 180 capsule 10    ranolazine (RANEXA) 500 MG Tb12 Take 1 tablet (500 mg total) by mouth 2 (two) times daily. 180 tablet 2    terbinafine HCl (LAMISIL) 250 mg tablet Take 1 tablet (250 mg total) by mouth once daily. 30 tablet 2    [DISCONTINUED] albuterol (PROVENTIL) 5 mg/mL nebulizer solution Take 0.5 mLs (2.5 mg total) by nebulization every 6 (six) hours as needed for Wheezing. 1 vial 11    [DISCONTINUED] albuterol-ipratropium 2.5mg-0.5mg/3mL (DUO-NEB) 0.5 mg-3 mg(2.5 mg base)/3 mL nebulizer solution USE 1 VIAL VIA NEBULIZER EVERY 6 HOURS WHILE AWAKE 1080 mL 3    [DISCONTINUED] betamethasone dipropionate (DIPROLENE) 0.05 % cream Apply topically 2 (two) times daily. 60 g 1    [DISCONTINUED] budesonide (PULMICORT) 0.5 mg/2  mL nebulizer solution Take 2 mLs (0.5 mg total) by nebulization 2 (two) times daily. 360 mL 3     No facility-administered encounter medications on file as of 2/21/2018.      Orders Placed This Encounter   Procedures    X-Ray Chest PA And Lateral     Standing Status:   Future     Standing Expiration Date:   2/21/2019     Order Specific Question:   May the Radiologist modify the order per protocol to meet the clinical needs of the patient?     Answer:   Yes     Plan:       Requested Prescriptions     Signed Prescriptions Disp Refills    albuterol 90 mcg/actuation inhaler 1 Inhaler 12     Sig: Inhale 2 puffs into the lungs every 6 (six) hours.    furosemide (LASIX) 20 MG tablet 180 tablet 3     Sig: Take 1 tablet (20 mg total) by mouth 2 (two) times daily.     Chronic obstructive pulmonary disease, unspecified COPD type  -     albuterol 90 mcg/actuation inhaler; Inhale 2 puffs into the lungs every 6 (six) hours.  Dispense: 1 Inhaler; Refill: 12  -     X-Ray Chest PA And Lateral; Future; Expected date: 02/21/2018    Edema, unspecified type  -     furosemide (LASIX) 20 MG tablet; Take 1 tablet (20 mg total) by mouth 2 (two) times daily.  Dispense: 180 tablet; Refill: 3           Follow-up in about 1 year (around 2/21/2019) for cxr .    MEDICAL DECISION MAKING: Moderate to high complexity.  Overall, the multiple problems listed are of moderate to high severity that may impact quality of life and activities of daily living. Side effects of medications, treatment plan as well as options and alternatives reviewed and discussed with patient. There was counseling of patient concerning these issues.    Total time spent in face to face counseling and coordination of care - 40  minutes over 50% of time was used in discussion of prognosis, risks, benefits of treatment, instructions and compliance with regimen . Discussion with other physicians or health care providers (DME, NP, pharmacy, respiratory therapy) occurred.

## 2018-02-21 NOTE — LETTER
February 21, 2018      Aden Jones MD  [address]  [place] [address]           [facility] - Podiat[facility]  [address] [facility] [address] [place] [address]  Phone: [phone]  Fax: [phone]          Patient: Carmela Maria   MR Number: [id number]   YOB: 1926   Date of Visit: 2/21/2018       Dear Dr. Aden Jones:    Thank you for referring Carmela Mraia to me for evaluation. Attached you will find relevant portions of my assessment and plan of care.    If you have questions, please do not hesitate to call me. I look forward to following Carmela Maria along with you.    Sincerely,    Pricilla Anderson, LUIZ    Enclosure  CC:  No Recipients    If you would like to receive this communication electronically, please contact [email or web address] or [phone] to request more information on [employer] access.    For providers and/or their staff who would like to refer a patient to [facility], please contact us through our one-stop-shop provider referral line, [facility], at [phone].    If you feel you have received this communication in error or would no longer like to receive these types of communications, please e-mail [email or web address]

## 2018-02-21 NOTE — LETTER
February 21, 2018      Aden Jones MD  [address]  [address]  [place] [address]           [facility] - Pulmonary Services  [address]  [place] [address]  Phone: [phone]  Fax: [phone]          Patient: Carmela Maria   MR Number: [id number]   YOB: 1926   Date of Visit: 2/21/2018       Dear Dr. Gigi Arroyo:    Thank you for referring Carmela Maria to me for evaluation. Attached you will find relevant portions of my assessment and plan of care.    If you have questions, please do not hesitate to call me. I look forward to following Carmela Maria along with you.    Sincerely,    Gigi Arroyo MD    Enclosure  CC:  No Recipients    IIf you would like to receive this communication electronically, please contact [email or web address] or [phone] to request [employer] access.    [employer] is a tool which provides read-only access to select patient information with whom you have a relationship. Its easy to use and provides real time access to review your patients record including encounter summaries, notes, results, and demographic information.    If you feel you have received this communication in error or would no longer like to receive these types of communications, please e-mail [email or web address]

## 2018-02-21 NOTE — PROGRESS NOTES
Podiatry Initial Consultation  Requesting Consult Provider:   PCP: Dr. Aden Jones MD    CHIEF COMPLAINT   Chief Complaint   Patient presents with    Nail Problem     Bilateral nail thickness and discoloration         HPI:    Carmela Maria is a 89 y.o. female presenting to podiatry clinic with complaint of fungal infection on nails. She is accompanied with daughter in law. Dr. Jones started patient on oral lamisil for fungus.  Patient inquires about available treatment options. No further pedal complaints.      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 Unruly Ball    CHF (congestive heart failure)     Clotting disorder     COPD (chronic obstructive pulmonary disease)     Coronary artery disease     CVA (cerebral infarction) 11/13/2013    Depression     Fall 11/13/2013    Heart murmur     Hyperlipidemia     Osteoporosis     Palpitation 11/18/2015    Sleep apnea     Traumatic brain injury 11/13/2013    Ulcer        PROBLEM LIST  Patient Active Problem List    Diagnosis Date Noted    Discoloration and thickening of nails both feet 02/13/2018    Leukemia, acute myeloid, in remission 08/17/2017    Age-related osteoporosis without current pathological fracture 04/24/2017    Chronic obstructive pulmonary disease 08/12/2013    Depression, major, recurrent, moderate 08/12/2013    CML (chronic myelocytic leukemia) 06/14/2013    OA (osteoarthritis) 06/12/2013    Aortic stenosis 06/09/2013       MEDS  Current Outpatient Prescriptions on File Prior to Visit   Medication Sig Dispense Refill    albuterol (PROVENTIL) 5 mg/mL nebulizer solution Take 0.5 mLs (2.5 mg total) by nebulization every 6 (six) hours as needed for Wheezing. 1 vial 11    albuterol-ipratropium 2.5mg-0.5mg/3mL (DUO-NEB) 0.5 mg-3 mg(2.5 mg base)/3 mL nebulizer solution USE 1 VIAL VIA  NEBULIZER EVERY 6 HOURS WHILE AWAKE 1080 mL 3    budesonide (PULMICORT) 0.5 mg/2 mL nebulizer solution Take 2 mLs (0.5 mg total) by nebulization 2 (two) times daily. 360 mL 3    cholecalciferol, vitamin D3, (VITAMIN D3) 400 unit Chew Take by mouth. Take  tues - thurs - sat      calcium carbonate (OS-KASIA) 600 mg (1,500 mg) Tab Take 600 mg by mouth once daily.      citalopram (CELEXA) 40 MG tablet TAKE 1 TABLET DAILY 90 tablet 3    dasatinib (SPRYCEL) 50 MG tablet Take 1 tablet (50 mg total) by mouth every other day.      guaifenesin (MUCINEX) 600 mg 12 hr tablet Take 1 tablet (600 mg total) by mouth 2 (two) times daily. 60 tablet 2    hydrOXYzine pamoate (VISTARIL) 25 MG Cap Take 1 capsule (25 mg total) by mouth nightly as needed (insomnia or anxiety). 30 capsule 0    metoprolol succinate (TOPROL XL) 25 MG 24 hr tablet Take 1 tablet (25 mg total) by mouth once daily. 30 tablet 6    ranitidine (ZANTAC) 150 MG capsule Take 1 capsule (150 mg total) by mouth 2 (two) times daily. 180 capsule 10    ranolazine (RANEXA) 500 MG Tb12 Take 1 tablet (500 mg total) by mouth 2 (two) times daily. 180 tablet 2    terbinafine HCl (LAMISIL) 250 mg tablet Take 1 tablet (250 mg total) by mouth once daily. 30 tablet 2    [DISCONTINUED] betamethasone dipropionate (DIPROLENE) 0.05 % cream Apply topically 2 (two) times daily. 60 g 1     No current facility-administered medications on file prior to visit.        PSH     Past Surgical History:   Procedure Laterality Date    APPENDECTOMY       SECTION      TUBAL LIGATION          ALL  Review of patient's allergies indicates:   Allergen Reactions    Asmanex twisthaler  [mometasone]      Other reaction(s): Rhinitis  Other reaction(s): Headache  Other reaction(s): Eye irritation    Aspirin      Other reaction(s): Unknown    Brovana  [arformoterol] Nausea Only     Other reaction(s): Headache  Other reaction(s): Dry Nose    Butisol  [butabarbital]      Other reaction(s):  "Unknown    Codeine      Other reaction(s): Unknown    Diazepam      Other reaction(s): Unknown    Iodine      Other reaction(s): Unknown    Limbitrol  [amitriptyline-chlordiazepoxide]      Other reaction(s): Unknown  Other reaction(s): Unknown    Medrol  [methylprednisolone]      Other reaction(s): Shortness of breath  Other reaction(s): Shortness of breath    Pcn [penicillins]     Sulfa (sulfonamide antibiotics)      Other reaction(s): Unknown       SOC     Social History   Substance Use Topics    Smoking status: Never Smoker    Smokeless tobacco: Never Used    Alcohol use No         Family HX  Family History   Problem Relation Age of Onset    COPD Mother     Heart disease Father     Cancer Sister     COPD Sister     COPD Brother             REVIEW OF SYSTEMS  General: Denies any fever or chills  Chest: Denies shortness of breath, wheezing, coughing, or sputum production  Heart: Denies chest pain, cold extremities, orthopenia, or reduced exercise tolerance  As noted above and per history of current illness above, otherwise negative in the remainder of the 14 systems.      PHYSICAL EXAM:      Vitals:    02/21/18 0829   BP: (!) 145/58   Pulse: 63   Weight: 62.8 kg (138 lb 7.2 oz)   Height: 4' 11" (1.499 m)   PainSc: 0-No pain       General: This patient is well-developed, well-nourished and appears stated age, well-oriented to person, place and time, and cooperative and pleasant on today's visit      LOWER EXTREMITY  Vascular:   · Palpable DP/PT pulses b/l  · Skin temperature warm to warm from prox to distally  · CFT <5 secs b/l  · There is  edema noted b/l perimalleolar     Dermatologic:   · Nails 1-5 bilateral thickened, elongated, dystrophic, with subungual debris  · No open skin lesions noted  · No erythema or drainage noted b/l  · Webspaces are C/D/I B/L  · There is hyperkeratotic tissue noted plantar foot .   · Skin texture and turgor dry    Neurologic:  · epicritic sensation grossly intact b/l "   · Light touch and sharp/dull sensation intact b/l  · Achilles and patellar deep tendon reflexes intact  · Babinski reflex absent b/l    Musculoskeletal/Orthopedic:  · No symptomatic structural abnormalities noted.   · Muscle strength AT/EHL/EDL/PT: 5/5; Achilles/Gastroc/Soleus: 5/5; PB/PL: 5/5 Muscle tone is normal.  · Ankle joint ROM  B/L supple DF/PF, non-crepitus  · STJ ROM supple inv/ev, non crepitus   · MTPJ b/l supple DF/PF, non crepitus    ASSESSMENT   There are no diagnoses linked to this encounter.    PLAN    1. Patient was educated about clinical and imaging findings, and verbalizes understanding of above.  2. I Discussed treatment options for nail fungus.   -I explained that fungus lives in a warm dark moist environment and therefore patient should make every attempt to keep feet clean and dry.  We discussed drying feet thoroughly after shower particularly between the toes and then applying powder between the toes and in the shoes. I also discussed to disinfect shower tub, discard old shoes, and disinfect current shoes with antiseptic  -For fungal toenails I prescribed PENLAC nail lacquer to be used daily for up to a year.  I have provided the patient written instructions on topical solution application   -With patient's permission, nails were aggressively reduced and debrided x 10 to their soft tissue attachment mechanically and with electric , removing all offending nail and debris. Patient relates relief following the procedure.       3. RTC  for follow up/evaluation as scheduled    Report Electronically Signed By:  Pricilla Anderson DPM   Podiatric Medicine & Surgery  [facility]  2/21/2018  8:34 AM

## 2018-02-21 NOTE — PATIENT INSTRUCTIONS
PENLAC ANTI-FUNGAL TOPICAL INSTRUCTIONS   1. You have been prescribed Penlac nail lacquer (Ciclopirox) topical solution 8%. Go and  the prescription from your local pharmacy.    2. Remove any nail polish on your feet. File away (with emery board) loose nail material and trim nails.    3. Apply the Penlac nail lacquer (ciclopirox) Topical Solution, 8% once daily (preferably at bedtime or eight hours before washing) to all affected nails with the applicator brush provided. The nail lacquer should be applied evenly over the entire nail plate. If possible, apply the solution to the nail bed, hyponychium, and the under surface of the nail plate when if your nail is loosely attached. Remember fungus lives under the nail plate, therefore the more the solution penetrates the nail bed, the better.    3. Continue to apply the solution daily (at bedtime preferably).  Daily applications should be made over the previous coat and removed with alcohol every seven days. This cycle should be repeated throughout the duration of therapy.     4. On the 7th day, remove the solution from all of your nails with alcohol. File your nails again with an emery board and then repeat the cycle again.    5. This treatment must be consistent. If you skip a day, continue the cycle as recommended. It may take up to 1 year for your nails to clear the fungal infection. Be patient.    It is recommended to dispose of all infected shoe gear that you will not likely wear again as well as weekly cleansing of all showering/bathing areas with antiseptics.Fungal spores like to hide in dark, warm, moist environments. Lastly, monthly treatments of all current shoe gear with moth balls x 8 hours.                  FUNGAL NAIL INFECTIONS    If your nail is thick and looks white or yellow, it may be infected with fungus. Nail   infections don't look pleasant, but they are not serious. There are treatments that   can clear up the infection.     What is a  fungal nail infection?   It's easy to catch a fungal nail infection, and lots of people get them. The sooner you   treat an infection, the easier it is to get rid of it. The fungi that cause these infections often live in warm, damp places, such as showers and floors around changing rooms.   People used to think there was nothing you could do about a fungal nail infection. But   there are now good treatments that can get rid of it. However, they take a long time to   work (as long as one year if the infection is bad). So you need to be patient.    Fungal infection of the nails causes changes in the appearance of fingernails and toenails. They may thicken, discolor, change shape or split. This condition is difficult to treat because nails grow slowly and have limited blood supply. It is common to have the infection come back after treatment.       What are the symptoms?   Your nail may look whitish or yellowish, and raised up from the finger or toe. The skin   around your nail may turn red. Sometimes the nail lifts off altogether. If the infection is   severe, the nail may also be crumbly. It's more common to get an infection of a toenail   than a fingernail. If you've had an infection for a long time, it may be painful. If you have a badly infected toe, it may be difficult to walk. If your immune system is weak or you have diabetes, you may be more likely to get these infections. The infection can also be more serious. So it's important to see your doctor as soon as possible if you think you have a nail infection.     What treatments work?   To get rid of a fungal nail infection you will probably have to take tablets, sometimes for   several months. There are also topical solutions (over the counter and prescription) that  you can put on your nail, and  things you can do to try to avoid getting another nail infection.    There are two types of medicines used.   Topical anti-fungal medicines are applied to the  "surface of the skin and nail area. These medicines are not very effective because they cannot get deep into the nail.     Topical medicines are most useful in combination with oral medicines . Oral antifungal medicines are more effective because they penetrate the nail from the inside out.  If medicines fail, the nail can be removed surgically or chemically. This improves the effectiveness of medical treatment because the fungus is physically removed from the body.       Tablets   The tablets that doctors use most often are called itraconazole (brand name Sporanox)   and terbinafine (Lamisil). Terbinafine seems to work slightly better. If you take terbinafine every day for 12 weeks, there's a 6 in 10 chance you'll get rid of   your fungal toenail infection.      How are fungal nail infections treated? -- Treatment depends, in part, on how severe the infection is, and how much it bothers you. If your infection is mild or doesn't bother you very much, you might choose not to treat it. An untreated nail infection probably won't go away, but it probably won't cause any long-term problems either.  When people need or choose to have treatment, it usually involves "antifungal" medicines that you get with a prescription from your doctor. These medicines are taken by mouth or put on the nail.Treatment with pills usually lasts a few months. Some people who take these medicines need to have blood tests. That's because these medicines can affect the liver.  If you don't want to or can't take antifungal pills, your doctor will talk with you about other treatment options. These might include using an antifungal medicine on the nail or having surgery to remove your nail.    Before starting any of these treatments, you should know that:  ?It can take many months for your nail to look normal again.  ?There is a chance that the treatment won't work. The infection might not get better, or it might come back. If either of these things " happen, your doctor can try another treatment or send you to a specialist.  Can fungal nail infections be prevented? -- Sometimes. To reduce your chance of getting one, you can:  ?Keep your feet clean and dry.  ?Avoid sharing nail tools, such as clippers and scissors.  ?Wear flip-flops or other footwear in a gym shower or locker room.  What if I want to get pregnant? -- If you want to get pregnant, let your doctor or nurse know. He or she might recommend that you not take certain antifungal medicines during pregnancy.    Alternative final options are:   If still no resolution with oral tablets or topics: then we can completely avulse/remove all involved toenails with subsequent treatment with topical antifungal solution.       Home Care:   1) Use medicines exactly as directed for as long as directed. Treating a fungal infection can take longer than other kinds of infections.   2) Smoking is a risk factor for fungal infection. This is one more reason to quit.   3) Wear absorbent socks and shoes that have ventilation. Sweaty feet increases risk of fungal infection and make an existing infection harder to treat.   4) Use footwear when in damp public places like swimming pools, gyms and shower rooms. This helps avoid exposure to the fungus that grows there.   It is recommended to dispose of all infected shoe gear that you will not likely wear again as well as weekly cleansing of all showering/bathing areas with antiseptics.Fungal spores like to hide in dark, warm, moist environments. Lastly, monthly treatments of all current shoe gear with moth balls x 8 hours.       Follow Up   with your doctor as directed by our staff.   Get Prompt Medical Attention   if any of the following occur:   -- Skin alongside the nail becomes reddened, swollen, painful or drains pus   -- Side effects from oral anti-fungal medicines       © 9103-8376 [employer]. [address] [place] [address], [place] [address]. All rights reserved.  This information is not intended as a substitute for professional medical care. Always follow your healthcare professional's instructions.

## 2018-02-22 DIAGNOSIS — R79.89 ELEVATED BRAIN NATRIURETIC PEPTIDE (BNP) LEVEL: ICD-10-CM

## 2018-02-22 DIAGNOSIS — D64.9 ANEMIA, UNSPECIFIED TYPE: Primary | ICD-10-CM

## 2018-02-22 DIAGNOSIS — D53.1 OTHER MEGALOBLASTIC ANEMIAS, NOT ELSEWHERE CLASSIFIED: ICD-10-CM

## 2018-02-25 ENCOUNTER — PATIENT MESSAGE (OUTPATIENT)
Dept: INTERNAL MEDICINE | Facility: CLINIC | Age: 83
End: 2018-02-25

## 2018-02-26 ENCOUNTER — PATIENT OUTREACH (OUTPATIENT)
Dept: ADMINISTRATIVE | Facility: HOSPITAL | Age: 83
End: 2018-02-26

## 2018-02-26 ENCOUNTER — LAB VISIT (OUTPATIENT)
Dept: LAB | Facility: HOSPITAL | Age: 83
End: 2018-02-26
Attending: INTERNAL MEDICINE
Payer: MEDICARE

## 2018-02-26 ENCOUNTER — TELEPHONE (OUTPATIENT)
Dept: INTERNAL MEDICINE | Facility: CLINIC | Age: 83
End: 2018-02-26

## 2018-02-26 DIAGNOSIS — D64.9 ANEMIA, UNSPECIFIED TYPE: ICD-10-CM

## 2018-02-26 DIAGNOSIS — R79.89 ELEVATED BRAIN NATRIURETIC PEPTIDE (BNP) LEVEL: ICD-10-CM

## 2018-02-26 DIAGNOSIS — D53.1 OTHER MEGALOBLASTIC ANEMIAS, NOT ELSEWHERE CLASSIFIED: ICD-10-CM

## 2018-02-26 LAB
ALBUMIN SERPL BCP-MCNC: 3.3 G/DL
ALP SERPL-CCNC: 58 U/L
ALT SERPL W/O P-5'-P-CCNC: 11 U/L
ANION GAP SERPL CALC-SCNC: 8 MMOL/L
AST SERPL-CCNC: 20 U/L
BILIRUB SERPL-MCNC: 0.4 MG/DL
BNP SERPL-MCNC: 445 PG/ML
BUN SERPL-MCNC: 20 MG/DL
CALCIUM SERPL-MCNC: 9.2 MG/DL
CHLORIDE SERPL-SCNC: 101 MMOL/L
CO2 SERPL-SCNC: 31 MMOL/L
CREAT SERPL-MCNC: 0.8 MG/DL
EST. GFR  (AFRICAN AMERICAN): >60 ML/MIN/1.73 M^2
EST. GFR  (NON AFRICAN AMERICAN): >60 ML/MIN/1.73 M^2
FERRITIN SERPL-MCNC: 21 NG/ML
FOLATE SERPL-MCNC: 13.8 NG/ML
GLUCOSE SERPL-MCNC: 98 MG/DL
IRON SERPL-MCNC: 66 UG/DL
POTASSIUM SERPL-SCNC: 4.4 MMOL/L
PROT SERPL-MCNC: 6.7 G/DL
SATURATED IRON: 16 %
SODIUM SERPL-SCNC: 140 MMOL/L
TOTAL IRON BINDING CAPACITY: 411 UG/DL
TRANSFERRIN SERPL-MCNC: 278 MG/DL
VIT B12 SERPL-MCNC: 600 PG/ML

## 2018-02-26 PROCEDURE — 80053 COMPREHEN METABOLIC PANEL: CPT

## 2018-02-26 PROCEDURE — 82728 ASSAY OF FERRITIN: CPT

## 2018-02-26 PROCEDURE — 83880 ASSAY OF NATRIURETIC PEPTIDE: CPT

## 2018-02-26 PROCEDURE — 83921 ORGANIC ACID SINGLE QUANT: CPT

## 2018-02-26 PROCEDURE — 82607 VITAMIN B-12: CPT

## 2018-02-26 PROCEDURE — 82746 ASSAY OF FOLIC ACID SERUM: CPT

## 2018-02-26 PROCEDURE — 83540 ASSAY OF IRON: CPT

## 2018-02-26 NOTE — TELEPHONE ENCOUNTER
----- Message from Macy Cortes sent at 2/26/2018  2:10 PM CST -----  Pt needs to speak to the nurse regarding her labs/please call [phone]/ma

## 2018-02-26 NOTE — TELEPHONE ENCOUNTER
Spoke with pt's daughter and son. Let them know lab work is ordered for [facility] today. Daughter stated she would bring pt in around 4pm. Daughter verbalized understanding.

## 2018-02-26 NOTE — TELEPHONE ENCOUNTER
----- Message from Janessa Kumar sent at 2/26/2018  1:46 PM CST -----  Contact: PT son/ Ede   Caller request call back to see if pt can do labs at the [facility]. [phone] cell  [phone] home phone

## 2018-02-26 NOTE — TELEPHONE ENCOUNTER
----- Message from Macy Cortes sent at 2/26/2018  2:10 PM CST -----  Pt needs to speak to the nurse regarding her labs/please call [phone]/ma

## 2018-03-01 ENCOUNTER — APPOINTMENT (OUTPATIENT)
Dept: RADIOLOGY | Facility: HOSPITAL | Age: 83
End: 2018-03-01
Attending: NURSE PRACTITIONER
Payer: MEDICARE

## 2018-03-01 ENCOUNTER — TELEPHONE (OUTPATIENT)
Dept: INTERNAL MEDICINE | Facility: CLINIC | Age: 83
End: 2018-03-01

## 2018-03-01 ENCOUNTER — OFFICE VISIT (OUTPATIENT)
Dept: URGENT CARE | Facility: CLINIC | Age: 83
End: 2018-03-01
Payer: MEDICARE

## 2018-03-01 VITALS
WEIGHT: 135.56 LBS | HEIGHT: 62 IN | DIASTOLIC BLOOD PRESSURE: 59 MMHG | TEMPERATURE: 97 F | BODY MASS INDEX: 24.95 KG/M2 | HEART RATE: 70 BPM | OXYGEN SATURATION: 96 % | SYSTOLIC BLOOD PRESSURE: 132 MMHG

## 2018-03-01 DIAGNOSIS — K59.00 CONSTIPATION, UNSPECIFIED CONSTIPATION TYPE: ICD-10-CM

## 2018-03-01 DIAGNOSIS — R10.9 ABDOMINAL PAIN, UNSPECIFIED ABDOMINAL LOCATION: Primary | ICD-10-CM

## 2018-03-01 LAB — METHYLMALONATE SERPL-SCNC: 0.3 UMOL/L

## 2018-03-01 PROCEDURE — 74019 RADEX ABDOMEN 2 VIEWS: CPT | Mod: TC,PO

## 2018-03-01 PROCEDURE — 99999 PR PBB SHADOW E&M-EST. PATIENT-LVL IV: CPT | Mod: PBBFAC,,, | Performed by: NURSE PRACTITIONER

## 2018-03-01 PROCEDURE — 99214 OFFICE O/P EST MOD 30 MIN: CPT | Mod: S$PBB,,, | Performed by: NURSE PRACTITIONER

## 2018-03-01 PROCEDURE — 99214 OFFICE O/P EST MOD 30 MIN: CPT | Mod: PBBFAC,PN | Performed by: NURSE PRACTITIONER

## 2018-03-01 PROCEDURE — 74019 RADEX ABDOMEN 2 VIEWS: CPT | Mod: 26,,, | Performed by: RADIOLOGY

## 2018-03-01 RX ORDER — PHENOL/SODIUM PHENOLATE
20 AEROSOL, SPRAY (ML) MUCOUS MEMBRANE DAILY
Qty: 30 EACH | Refills: 0 | Status: SHIPPED | OUTPATIENT
Start: 2018-03-01 | End: 2018-03-12 | Stop reason: SINTOL

## 2018-03-01 NOTE — PROGRESS NOTES
Subjective:       Patient ID: Carmela Maria is a 89 y.o. female.    Chief Complaint: Abdominal Pain and Extremity Weakness    89 year old female presents to Urgent Care with reports of epigastric abdominal pain that has been present for about 2 days. Denies any other problems or concerns at this time.       Abdominal Pain   This is a new problem. The current episode started yesterday. Pertinent negatives include no diarrhea, dysuria, fever, nausea or vomiting.   Extremity Weakness    Pertinent negatives include no fever or numbness.     Review of Systems   Constitutional: Negative for appetite change, chills and fever.   HENT: Negative for ear pain, sinus pressure, sore throat and trouble swallowing.    Eyes: Negative for visual disturbance.   Respiratory: Negative for shortness of breath.    Cardiovascular: Negative for chest pain.   Gastrointestinal: Positive for abdominal pain. Negative for diarrhea, nausea and vomiting.   Endocrine: Negative for cold intolerance, polyphagia and polyuria.   Genitourinary: Negative for decreased urine volume and dysuria.   Musculoskeletal: Positive for extremity weakness. Negative for back pain.   Skin: Negative for rash.   Allergic/Immunologic: Negative for environmental allergies and food allergies.   Neurological: Negative for dizziness, tremors, weakness and numbness.   Hematological: Does not bruise/bleed easily.   Psychiatric/Behavioral: Negative for confusion and hallucinations. The patient is not nervous/anxious and is not hyperactive.    All other systems reviewed and are negative.      Objective:     Physical Exam   Constitutional: She is oriented to person, place, and time. She appears well-developed and well-nourished.   HENT:   Head: Normocephalic and atraumatic.   Right Ear: External ear normal.   Left Ear: External ear normal.   Nose: Nose normal.   Mouth/Throat: Oropharynx is clear and moist.   Eyes: Conjunctivae and EOM are normal. Pupils are equal, round, and  reactive to light.   Neck: Normal range of motion. Neck supple.   Cardiovascular: Normal rate, regular rhythm, normal heart sounds and intact distal pulses.    No murmur heard.  Pulmonary/Chest: Effort normal and breath sounds normal. She has no wheezes.   Abdominal: Soft. Bowel sounds are normal. There is no tenderness.   Musculoskeletal: Normal range of motion.   Neurological: She is alert and oriented to person, place, and time. She has normal reflexes.   Skin: Skin is warm and dry. No rash noted.   Psychiatric: She has a normal mood and affect. Her behavior is normal. Judgment and thought content normal.   Nursing note and vitals reviewed.    Assessment:     1. Abdominal pain, unspecified abdominal location      Plan:   Carmela was seen today for abdominal pain and extremity weakness.    Diagnoses and all orders for this visit:    Abdominal pain, unspecified abdominal location  -     X-Ray Abdomen Flat And Erect    Constipation, unspecified constipation type    Other orders  -     omeprazole 20 mg TbEC; Take 20 mg by mouth once daily.    Instructed patient to follow prescribed treatment plan as directed and recommended follow up with PCP within 1 week or sooner if needed.

## 2018-03-01 NOTE — PATIENT INSTRUCTIONS
Abdominal Pain    Abdominal pain is pain in the stomach or belly area. Everyone has this pain from time to time. In many cases it goes away on its own. But abdominal pain can sometimes be due to a serious problem, such as appendicitis. So its important to know when to seek help.  Causes of abdominal pain  There are many possible causes of abdominal pain. Common causes in adults include:  · Constipation, diarrhea, or gas  · Stomach acid flowing back up into the esophagus (acid reflux or heartburn)  · Severe acid reflux, called GERD (gastroesophageal reflux disease)  · A sore in the lining of the stomach or small intestine (peptic ulcer)  · Inflammation of the gallbladder, liver, or pancreas  · Gallstones or kidney stones  · Appendicitis   · Intestinal blockage   · An internal organ pushing through a muscle or other tissue (hernia)  · Urinary tract infections  · In women, menstrual cramps, fibroids, or endometriosis  · Inflammation or infection of the intestines  Diagnosing the cause of abdominal pain  Your healthcare provider will do a physical exam help find the cause of your pain. If needed, tests will be ordered. Belly pain has many possible causes. So it can be hard to find the reason for your pain. Giving details about your pain can help. Tell your provider where and when you feel the pain, and what makes it better or worse. Also let your provider know if you have other symptoms such as:  · Fever  · Tiredness  · Upset stomach (nausea)  · Vomiting  · Changes in bathroom habits  Treating abdominal pain  Some causes of pain need emergency medical treatment right away. These include appendicitis or a bowel blockage. Other problems can be treated with rest, fluids, or medicines. Your healthcare provider can give you specific instructions for treatment or self-care based on what is causing your pain.  If you have vomiting or diarrhea, sip water or other clear fluids. When you are ready to eat solid foods again,  start with small amounts of easy-to-digest, low-fat foods. These include apple sauce, toast, or crackers.   When to seek medical care  Call 911 or go to the hospital right away if you:  · Cant pass stool and are vomiting  · Are vomiting blood or have bloody diarrhea or black, tarry diarrhea  · Have chest, neck, or shoulder pain  · Feel like you might pass out  · Have pain in your shoulder blades with nausea  · Have sudden, severe belly pain  · Have new, severe pain unlike any you have felt before  · Have a belly that is rigid, hard, and tender to touch  Call your healthcare provider if you have:  · Pain for more than 5 days  · Bloating for more than 2 days  · Diarrhea for more than 5 days  · A fever of 100.4°F (38.0°C) or higher, or as directed by your provider  · Pain that gets worse  · Weight loss for no reason  · Continued lack of appetite  · Blood in your stool  How to prevent abdominal pain  Here are some tips to help prevent abdominal pain:  · Eat smaller amounts of food at one time.  · Avoid greasy, fried, or other high-fat foods.  · Avoid foods that give you gas.  · Exercise regularly.  · Drink plenty of fluids.  To help prevent GERD symptoms:  · Quit smoking.  · Reduce alcohol and certain foods that increase stomach acid.  · Avoid aspirin and over-the-counter pain and fever medicines (NSAIDS or nonsteroidal anti-inflammatory drugs), if possible  · Lose extra weight.  · Finish eating at least 2 hours before you go to bed or lie down.  · Raise the head of your bed.  Date Last Reviewed: 7/1/2016  © 2787-6239 [employer]. [address] [place] [address], [place] [address]. All rights reserved. This information is not intended as a substitute for professional medical care. Always follow your healthcare professional's instructions.

## 2018-03-01 NOTE — TELEPHONE ENCOUNTER
----- Message from Alma Lane sent at 3/1/2018  3:11 PM CST -----  Contact: pt paul Mera  Please call pt son back at [phone] concerning pt being constipated.

## 2018-03-06 DIAGNOSIS — Z51.81 MEDICATION MONITORING ENCOUNTER: ICD-10-CM

## 2018-03-06 DIAGNOSIS — M81.0 AGE-RELATED OSTEOPOROSIS WITHOUT CURRENT PATHOLOGICAL FRACTURE: Primary | ICD-10-CM

## 2018-03-06 RX ORDER — SODIUM CHLORIDE 0.9 % (FLUSH) 0.9 %
10 SYRINGE (ML) INJECTION
Status: CANCELLED | OUTPATIENT
Start: 2018-03-06

## 2018-03-06 RX ORDER — ZOLEDRONIC ACID 5 MG/100ML
5 INJECTION, SOLUTION INTRAVENOUS
Status: CANCELLED | OUTPATIENT
Start: 2018-03-06

## 2018-03-12 ENCOUNTER — OFFICE VISIT (OUTPATIENT)
Dept: INTERNAL MEDICINE | Facility: CLINIC | Age: 83
End: 2018-03-12
Payer: MEDICARE

## 2018-03-12 ENCOUNTER — LAB VISIT (OUTPATIENT)
Dept: LAB | Facility: HOSPITAL | Age: 83
End: 2018-03-12
Attending: INTERNAL MEDICINE
Payer: MEDICARE

## 2018-03-12 VITALS
HEART RATE: 57 BPM | OXYGEN SATURATION: 98 % | BODY MASS INDEX: 24.75 KG/M2 | HEIGHT: 62 IN | TEMPERATURE: 99 F | DIASTOLIC BLOOD PRESSURE: 59 MMHG | SYSTOLIC BLOOD PRESSURE: 136 MMHG | RESPIRATION RATE: 18 BRPM | WEIGHT: 134.5 LBS

## 2018-03-12 DIAGNOSIS — E55.9 VITAMIN D DEFICIENCY: ICD-10-CM

## 2018-03-12 DIAGNOSIS — R10.13 DYSPEPSIA: ICD-10-CM

## 2018-03-12 DIAGNOSIS — R10.13 DYSPEPSIA: Primary | ICD-10-CM

## 2018-03-12 LAB — 25(OH)D3+25(OH)D2 SERPL-MCNC: 43 NG/ML

## 2018-03-12 PROCEDURE — 99213 OFFICE O/P EST LOW 20 MIN: CPT | Mod: PBBFAC,PN | Performed by: INTERNAL MEDICINE

## 2018-03-12 PROCEDURE — 86677 HELICOBACTER PYLORI ANTIBODY: CPT

## 2018-03-12 PROCEDURE — 99999 PR PBB SHADOW E&M-EST. PATIENT-LVL III: CPT | Mod: PBBFAC,,, | Performed by: INTERNAL MEDICINE

## 2018-03-12 PROCEDURE — 99213 OFFICE O/P EST LOW 20 MIN: CPT | Mod: S$PBB,,, | Performed by: INTERNAL MEDICINE

## 2018-03-12 PROCEDURE — 82306 VITAMIN D 25 HYDROXY: CPT

## 2018-03-12 PROCEDURE — 36415 COLL VENOUS BLD VENIPUNCTURE: CPT | Mod: PO

## 2018-03-12 NOTE — PROGRESS NOTES
"Subjective:      Patient ID: Carmela Maria is a 89 y.o. female.    Chief Complaint: Follow-up      HPI     Ms. Carmela Maria is a patient of Aden Jones MD, who presents for stomach discomfort, which has been progressively worsening. She reports having a BM yesterday, which she attributes to taking Miralax for constipation. She reports stopping PPI several months ago due to an interaction with her dasatinib. She reports no association with food. She reports her stomach pain starts when she relaxes i.e. in the middle of the night. She reports taking ice cream "cools it off". +dark brown stools. No BRBPR.       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 Unruly Ball    CHF (congestive heart failure)     Clotting disorder     COPD (chronic obstructive pulmonary disease)     Coronary artery disease     CVA (cerebral infarction) 2013    Depression     Fall 2013    Heart murmur     Hyperlipidemia     Osteoporosis     Palpitation 2015    Sleep apnea     Traumatic brain injury 2013    Ulcer      Past Surgical History:   Procedure Laterality Date    APPENDECTOMY       SECTION      TUBAL LIGATION       Social History     Social History    Marital status:      Spouse name: N/A    Number of children: N/A    Years of education: N/A     Occupational History    Not on file.     Social History Main Topics    Smoking status: Never Smoker    Smokeless tobacco: Never Used    Alcohol use No    Drug use: No    Sexual activity: No     Other Topics Concern    Not on file     Social History Narrative    No narrative on file     Family History   Problem Relation Age of Onset    COPD Mother     Heart disease Father     Cancer Sister     COPD Sister     COPD Brother        Current Outpatient Prescriptions:     albuterol 90 " mcg/actuation inhaler, Inhale 2 puffs into the lungs every 6 (six) hours., Disp: 1 Inhaler, Rfl: 12    albuterol-ipratropium 2.5mg-0.5mg/3mL (DUO-NEB) 0.5 mg-3 mg(2.5 mg base)/3 mL nebulizer solution, USE 1 VIAL VIA NEBULIZER EVERY 6 HOURS WHILE AWAKE, Disp: 1080 mL, Rfl: 3    budesonide (PULMICORT) 0.5 mg/2 mL nebulizer solution, Take 2 mLs (0.5 mg total) by nebulization 2 (two) times daily., Disp: 360 mL, Rfl: 3    calcium carbonate (OS-KASIA) 600 mg (1,500 mg) Tab, Take 600 mg by mouth once daily., Disp: , Rfl:     cholecalciferol, vitamin D3, (VITAMIN D3) 400 unit Chew, Take by mouth. Take  tues - thurs - sat, Disp: , Rfl:     ciclopirox (PENLAC) 8 % Soln, Apply to affected toenails at night time DAILY. On 7th day, file nails down, clean all nails with alcohol and restart application process., Disp: 1 Bottle, Rfl: 10    citalopram (CELEXA) 40 MG tablet, TAKE 1 TABLET DAILY, Disp: 90 tablet, Rfl: 3    dasatinib (SPRYCEL) 50 MG tablet, Take 1 tablet (50 mg total) by mouth every other day., Disp: , Rfl:     furosemide (LASIX) 20 MG tablet, Take 1 tablet (20 mg total) by mouth 2 (two) times daily., Disp: 180 tablet, Rfl: 3    guaifenesin (MUCINEX) 600 mg 12 hr tablet, Take 1 tablet (600 mg total) by mouth 2 (two) times daily., Disp: 60 tablet, Rfl: 2    hydrOXYzine pamoate (VISTARIL) 25 MG Cap, Take 1 capsule (25 mg total) by mouth nightly as needed (insomnia or anxiety)., Disp: 30 capsule, Rfl: 0    ranolazine (RANEXA) 500 MG Tb12, Take 1 tablet (500 mg total) by mouth 2 (two) times daily., Disp: 180 tablet, Rfl: 2    metoprolol succinate (TOPROL XL) 25 MG 24 hr tablet, Take 1 tablet (25 mg total) by mouth once daily., Disp: 30 tablet, Rfl: 6    Review of patient's allergies indicates:   Allergen Reactions    Asmanex twisthaler  [mometasone]      Other reaction(s): Rhinitis  Other reaction(s): Headache  Other reaction(s): Eye irritation    Aspirin      Other reaction(s): Unknown    Brovana   "[arformoterol] Nausea Only     Other reaction(s): Headache  Other reaction(s): Dry Nose    Butisol  [butabarbital]      Other reaction(s): Unknown    Codeine      Other reaction(s): Unknown    Diazepam      Other reaction(s): Unknown    Iodine      Other reaction(s): Unknown    Limbitrol  [amitriptyline-chlordiazepoxide]      Other reaction(s): Unknown  Other reaction(s): Unknown    Medrol  [methylprednisolone]      Other reaction(s): Shortness of breath  Other reaction(s): Shortness of breath    Pcn [penicillins]     Sulfa (sulfonamide antibiotics)      Other reaction(s): Unknown        Review of Systems   Negative    Objective:     BP (!) 136/59 (BP Location: Left arm, Patient Position: Sitting, BP Method: Large (Automatic))   Pulse (!) 57   Temp 98.9 °F (37.2 °C) (Tympanic)   Resp 18   Ht 5' 2" (1.575 m)   Wt 61 kg (134 lb 7.7 oz)   SpO2 98%   BMI 24.60 kg/m²     Physical Exam  GEN: A&O fully, NAD  PSYC: Normal affect  GI: +epigastric TTP      Lab Results   Component Value Date    WBC 5.41 02/21/2018    HGB 11.0 (L) 02/21/2018    HCT 34.8 (L) 02/21/2018     (L) 02/21/2018    CHOL 169 11/10/2015    TRIG 119 11/10/2015    HDL 55 11/10/2015    LDLCALC 90.2 11/10/2015    ALT 11 02/26/2018    AST 20 02/26/2018     02/26/2018    K 4.4 02/26/2018     02/26/2018    CREATININE 0.8 02/26/2018    BUN 20 02/26/2018    CO2 31 (H) 02/26/2018    TSH 1.771 12/08/2016    INR 1.0 08/19/2017    GLUF 103 05/03/2006       Assessment:      1. Dyspepsia: No association with food, although localizes to epigastric region.    2.      Hypotension/bradycaria: Mild. Will hold metoprolol 25 mg qd for now.      Plan:   Dyspepsia  -     H. PYLORI ANTIBODY, IGG; Future; Expected date: 03/12/2018  -     Ambulatory consult to Gastroenterology        Follow-up in about 4 weeks (around 4/9/2018), or if symptoms worsen or fail to improve, for FU on epigastric pain.  "

## 2018-03-14 LAB — H PYLORI IGG SERPL QL IA: NEGATIVE

## 2018-04-05 ENCOUNTER — PATIENT MESSAGE (OUTPATIENT)
Dept: INTERNAL MEDICINE | Facility: CLINIC | Age: 83
End: 2018-04-05

## 2018-04-06 NOTE — TELEPHONE ENCOUNTER
Received a refill request from my chart, pt requesting new rx on Ranitidine 150 mg. Please review and advise.

## 2018-04-12 RX ORDER — RANOLAZINE 500 MG/1
TABLET, FILM COATED, EXTENDED RELEASE ORAL
Qty: 180 TABLET | Refills: 2 | Status: SHIPPED | OUTPATIENT
Start: 2018-04-12

## 2018-04-17 ENCOUNTER — OFFICE VISIT (OUTPATIENT)
Dept: INTERNAL MEDICINE | Facility: CLINIC | Age: 83
End: 2018-04-17
Payer: MEDICARE

## 2018-04-17 VITALS
HEART RATE: 81 BPM | BODY MASS INDEX: 24.83 KG/M2 | SYSTOLIC BLOOD PRESSURE: 114 MMHG | WEIGHT: 134.94 LBS | TEMPERATURE: 98 F | OXYGEN SATURATION: 97 % | DIASTOLIC BLOOD PRESSURE: 55 MMHG | RESPIRATION RATE: 18 BRPM | HEIGHT: 62 IN

## 2018-04-17 DIAGNOSIS — R53.82 CHRONIC FATIGUE: ICD-10-CM

## 2018-04-17 DIAGNOSIS — I35.0 AORTIC VALVE STENOSIS, ETIOLOGY OF CARDIAC VALVE DISEASE UNSPECIFIED: ICD-10-CM

## 2018-04-17 DIAGNOSIS — C92.10 CML (CHRONIC MYELOCYTIC LEUKEMIA): ICD-10-CM

## 2018-04-17 DIAGNOSIS — F33.1 DEPRESSION, MAJOR, RECURRENT, MODERATE: ICD-10-CM

## 2018-04-17 DIAGNOSIS — J44.9 CHRONIC OBSTRUCTIVE PULMONARY DISEASE, UNSPECIFIED COPD TYPE: ICD-10-CM

## 2018-04-17 DIAGNOSIS — I95.89 OTHER SPECIFIED HYPOTENSION: Primary | ICD-10-CM

## 2018-04-17 PROCEDURE — 99214 OFFICE O/P EST MOD 30 MIN: CPT | Mod: S$PBB,,, | Performed by: INTERNAL MEDICINE

## 2018-04-17 PROCEDURE — 99999 PR PBB SHADOW E&M-EST. PATIENT-LVL III: CPT | Mod: PBBFAC,,, | Performed by: INTERNAL MEDICINE

## 2018-04-17 PROCEDURE — 99213 OFFICE O/P EST LOW 20 MIN: CPT | Mod: PBBFAC,PN | Performed by: INTERNAL MEDICINE

## 2018-04-17 NOTE — PROGRESS NOTES
Subjective:      Patient ID: Carmela Maria is a 89 y.o. female.    Chief Complaint: Follow-up (1 month**)      HPI     Ms. Carmela Maria is a patient of Aden Jones MD, who presents for fatigue.       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 Unruly Ball    CHF (congestive heart failure)     Clotting disorder     COPD (chronic obstructive pulmonary disease)     Coronary artery disease     CVA (cerebral infarction) 2013    Depression     Fall 2013    Heart murmur     Hyperlipidemia     Osteoporosis     Palpitation 2015    Sleep apnea     Traumatic brain injury 2013    Ulcer      Past Surgical History:   Procedure Laterality Date    APPENDECTOMY       SECTION      TUBAL LIGATION       Social History     Social History    Marital status:      Spouse name: N/A    Number of children: N/A    Years of education: N/A     Occupational History    Not on file.     Social History Main Topics    Smoking status: Never Smoker    Smokeless tobacco: Never Used    Alcohol use No    Drug use: No    Sexual activity: No     Other Topics Concern    Not on file     Social History Narrative    No narrative on file     Family History   Problem Relation Age of Onset    COPD Mother     Heart disease Father     Cancer Sister     COPD Sister     COPD Brother        Current Outpatient Prescriptions:     albuterol 90 mcg/actuation inhaler, Inhale 2 puffs into the lungs every 6 (six) hours., Disp: 1 Inhaler, Rfl: 12    albuterol-ipratropium 2.5mg-0.5mg/3mL (DUO-NEB) 0.5 mg-3 mg(2.5 mg base)/3 mL nebulizer solution, USE 1 VIAL VIA NEBULIZER EVERY 6 HOURS WHILE AWAKE, Disp: 1080 mL, Rfl: 3    budesonide (PULMICORT) 0.5 mg/2 mL nebulizer solution, Take 2 mLs (0.5 mg total) by nebulization 2 (two) times daily., Disp: 360 mL, Rfl:  3    calcium carbonate (OS-KASIA) 600 mg (1,500 mg) Tab, Take 600 mg by mouth once daily., Disp: , Rfl:     cholecalciferol, vitamin D3, (VITAMIN D3) 400 unit Chew, Take by mouth. Take  tues - thurs - sat, Disp: , Rfl:     ciclopirox (PENLAC) 8 % Soln, Apply to affected toenails at night time DAILY. On 7th day, file nails down, clean all nails with alcohol and restart application process., Disp: 1 Bottle, Rfl: 10    citalopram (CELEXA) 40 MG tablet, TAKE 1 TABLET DAILY, Disp: 90 tablet, Rfl: 3    dasatinib (SPRYCEL) 50 MG tablet, Take 1 tablet (50 mg total) by mouth every other day., Disp: , Rfl:     guaifenesin (MUCINEX) 600 mg 12 hr tablet, Take 1 tablet (600 mg total) by mouth 2 (two) times daily., Disp: 60 tablet, Rfl: 2    hydrOXYzine pamoate (VISTARIL) 25 MG Cap, Take 1 capsule (25 mg total) by mouth nightly as needed (insomnia or anxiety)., Disp: 30 capsule, Rfl: 0    RANEXA 500 mg Tb12, TAKE 1 TABLET TWICE A DAY, Disp: 180 tablet, Rfl: 2    ranitidine (ZANTAC) 150 MG tablet, Take 1 tablet (150 mg total) by mouth 2 (two) times daily., Disp: 180 tablet, Rfl: 3    furosemide (LASIX) 20 MG tablet, Take 1 tablet (20 mg total) by mouth 2 (two) times daily., Disp: 180 tablet, Rfl: 3    Review of patient's allergies indicates:   Allergen Reactions    Asmanex twisthaler  [mometasone]      Other reaction(s): Rhinitis  Other reaction(s): Headache  Other reaction(s): Eye irritation    Aspirin      Other reaction(s): Unknown    Brovana  [arformoterol] Nausea Only     Other reaction(s): Headache  Other reaction(s): Dry Nose    Butisol  [butabarbital]      Other reaction(s): Unknown    Codeine      Other reaction(s): Unknown    Diazepam      Other reaction(s): Unknown    Iodine      Other reaction(s): Unknown    Limbitrol  [amitriptyline-chlordiazepoxide]      Other reaction(s): Unknown  Other reaction(s): Unknown    Medrol  [methylprednisolone]      Other reaction(s): Shortness of breath  Other  "reaction(s): Shortness of breath    Pcn [penicillins]     Sulfa (sulfonamide antibiotics)      Other reaction(s): Unknown        Review of Systems   Negative    Objective:     BP (!) 114/55 (BP Location: Left arm, Patient Position: Sitting, BP Method: Large (Automatic))   Pulse 81   Temp 98.2 °F (36.8 °C) (Tympanic)   Resp 18   Ht 5' 2" (1.575 m)   Wt 61.2 kg (134 lb 14.7 oz)   SpO2 97%   BMI 24.68 kg/m²     Physical Exam  GEN: A&O fully, NAD  PSYC: Normal affect      Lab Results   Component Value Date    WBC 5.41 02/21/2018    HGB 11.0 (L) 02/21/2018    HCT 34.8 (L) 02/21/2018     (L) 02/21/2018    CHOL 169 11/10/2015    TRIG 119 11/10/2015    HDL 55 11/10/2015    LDLCALC 90.2 11/10/2015    ALT 11 02/26/2018    AST 20 02/26/2018     02/26/2018    K 4.4 02/26/2018     02/26/2018    CREATININE 0.8 02/26/2018    BUN 20 02/26/2018    CO2 31 (H) 02/26/2018    TSH 1.771 12/08/2016    INR 1.0 08/19/2017    GLUF 103 05/03/2006       Assessment:      1. Other specified hypotension: May be 2/2 Ranexa. No CP. Will hold Ranexa for now since she stopped metoprolol 25 qd since last visit, which we will stop.   2. CML (chronic myelocytic leukemia): Stable. Continue current medical regimen.   3. Chronic obstructive pulmonary disease, unspecified COPD type: Stable. Continue current medical regimen.   4. Depression, major, recurrent, moderate: Stable. Continue current medical regimen.   5. Aortic valve stenosis, etiology of cardiac valve disease unspecified: May be contributing to her hypotension, which she declined surgery for several years ago.    6. Chronic fatigue        Plan:   Other specified hypotension    CML (chronic myelocytic leukemia)    Chronic obstructive pulmonary disease, unspecified COPD type    Depression, major, recurrent, moderate    Aortic valve stenosis, etiology of cardiac valve disease unspecified    Chronic fatigue        Follow-up in about 2 weeks (around 5/1/2018), or if " symptoms worsen or fail to improve, for FU on fatigue.

## 2018-05-03 ENCOUNTER — PATIENT OUTREACH (OUTPATIENT)
Dept: ADMINISTRATIVE | Facility: HOSPITAL | Age: 83
End: 2018-05-03

## 2018-05-03 NOTE — PROGRESS NOTES
"Subjective:       Patient ID: Carmela Maria is a 89 y.o. female.    Chief Complaint: reclast    Carmela is a pleasant 89 y/o here for Osteoporosis follow up. She is treated with reclast and has had 4 infusions, last done 4/2017. Prev treated with boniva and given drug holiday in 2012, after a colles fx reclast was resumed.  Dexa in 4/2017 showed stable bmd. She does fall frequently, has had a few falls over the last year but no broken bones. She take daily vit D 1000units, gets ca in her diet.  She walks with a walker.  She has OA to her hands but no sig pain.  She c/o worsening leg swelling lately with some increased sob. She is scheduled to see her cardiologist today, she does have CHF      Review of Systems   Constitutional: Negative for chills, fatigue and fever.   HENT: Negative for mouth sores, rhinorrhea and sore throat.    Eyes: Negative for pain and redness.   Respiratory: Positive for shortness of breath. Negative for cough.    Cardiovascular: Positive for leg swelling. Negative for chest pain.        Chf   Gastrointestinal: Negative for abdominal pain, constipation, diarrhea, nausea and vomiting.   Genitourinary: Negative for dysuria and hematuria.   Musculoskeletal: Positive for arthralgias and gait problem (walks with walker). Negative for joint swelling and myalgias.   Skin: Negative for rash.   Neurological: Negative for weakness, numbness and headaches.   Psychiatric/Behavioral: The patient is not nervous/anxious.          Objective:   /76   Pulse 72   Resp 20   Ht 5' 2" (1.575 m)   Wt 62 kg (136 lb 11 oz)   BMI 25.00 kg/m²      Physical Exam   Constitutional: She is oriented to person, place, and time and well-developed, well-nourished, and in no distress.   HENT:   Head: Normocephalic and atraumatic.   Eyes: Pupils are equal, round, and reactive to light. Right eye exhibits no discharge.   Neck: Normal range of motion.   Cardiovascular: Normal rate, regular rhythm and normal heart sounds.  " Exam reveals no friction rub.    Pulmonary/Chest: Effort normal and breath sounds normal. No respiratory distress.   Abdominal: Soft. She exhibits no distension. There is no tenderness.   Lymphadenopathy:     She has no cervical adenopathy.   Neurological: She is alert and oriented to person, place, and time.   Skin: No rash noted. No erythema.     Psychiatric: Mood normal.   Musculoskeletal: Normal range of motion. She exhibits no edema or deformity.   OA changes to jose enrique hands w 1 cmc squaring, pip and dip enlargement  jose enrique knees oa changes, no effusion, good rom   Walking with rolling walker           Recent Results (from the past 504 hour(s))   Comprehensive metabolic panel    Collection Time: 05/07/18  9:56 AM   Result Value Ref Range    Sodium 142 136 - 145 mmol/L    Potassium 4.3 3.5 - 5.1 mmol/L    Chloride 99 95 - 110 mmol/L    CO2 34 (H) 23 - 29 mmol/L    Glucose 96 70 - 110 mg/dL    BUN, Bld 21 10 - 30 mg/dL    Creatinine 0.8 0.5 - 1.4 mg/dL    Calcium 9.5 8.7 - 10.5 mg/dL    Total Protein 6.5 6.0 - 8.4 g/dL    Albumin 3.6 3.5 - 5.2 g/dL    Total Bilirubin 0.4 0.1 - 1.0 mg/dL    Alkaline Phosphatase 59 55 - 135 U/L    AST 17 10 - 40 U/L    ALT 11 10 - 44 U/L    Anion Gap 9 8 - 16 mmol/L    eGFR if African American >60 >60 mL/min/1.73 m^2    eGFR if non African American >60 >60 mL/min/1.73 m^2        Dexa 4.2017: NM -2.5, spine -1.1-stable bone density   Assessment:       1. Age-related osteoporosis without current pathological fracture    2. Medication monitoring encounter        Impression:  Osteoporosis: reclast x 4 infusions, stable bmd per dexa done 4/2017, vit D daily 1000mg, ca in diet (prev on boniva orally)    Medication monitoring: no issues tolerating reclast, gfr normal at 60+    Plan:       Ok for reclast #5 today, labs reviewed as above    plan for reclast #6 in a year then would repeat dexa in 5/2020 to determine further tx options  Cont vit D daily 1000units   Cont ca in diet  Call for any  question/concerns    rtc in 1 year for reclast #6 w cmp

## 2018-05-07 ENCOUNTER — TELEPHONE (OUTPATIENT)
Dept: CARDIOLOGY | Facility: CLINIC | Age: 83
End: 2018-05-07

## 2018-05-07 ENCOUNTER — INFUSION (OUTPATIENT)
Dept: RHEUMATOLOGY | Facility: HOSPITAL | Age: 83
End: 2018-05-07
Attending: PHYSICIAN ASSISTANT
Payer: MEDICARE

## 2018-05-07 ENCOUNTER — OFFICE VISIT (OUTPATIENT)
Dept: RHEUMATOLOGY | Facility: CLINIC | Age: 83
End: 2018-05-07
Payer: MEDICARE

## 2018-05-07 ENCOUNTER — OFFICE VISIT (OUTPATIENT)
Dept: CARDIOLOGY | Facility: CLINIC | Age: 83
End: 2018-05-07
Payer: MEDICARE

## 2018-05-07 VITALS
WEIGHT: 136.69 LBS | HEART RATE: 72 BPM | BODY MASS INDEX: 25.15 KG/M2 | RESPIRATION RATE: 20 BRPM | SYSTOLIC BLOOD PRESSURE: 130 MMHG | HEIGHT: 62 IN | DIASTOLIC BLOOD PRESSURE: 76 MMHG

## 2018-05-07 VITALS
SYSTOLIC BLOOD PRESSURE: 116 MMHG | DIASTOLIC BLOOD PRESSURE: 56 MMHG | HEIGHT: 62 IN | WEIGHT: 137.56 LBS | BODY MASS INDEX: 25.32 KG/M2 | HEART RATE: 76 BPM

## 2018-05-07 VITALS
RESPIRATION RATE: 20 BRPM | BODY MASS INDEX: 25 KG/M2 | HEART RATE: 65 BPM | SYSTOLIC BLOOD PRESSURE: 112 MMHG | DIASTOLIC BLOOD PRESSURE: 65 MMHG | WEIGHT: 136.69 LBS

## 2018-05-07 DIAGNOSIS — Z51.81 MEDICATION MONITORING ENCOUNTER: ICD-10-CM

## 2018-05-07 DIAGNOSIS — M81.0 AGE-RELATED OSTEOPOROSIS WITHOUT CURRENT PATHOLOGICAL FRACTURE: Primary | ICD-10-CM

## 2018-05-07 DIAGNOSIS — I35.0 NONRHEUMATIC AORTIC VALVE STENOSIS: ICD-10-CM

## 2018-05-07 DIAGNOSIS — R60.0 LOCALIZED EDEMA: ICD-10-CM

## 2018-05-07 DIAGNOSIS — I50.33 ACUTE ON CHRONIC DIASTOLIC HEART FAILURE: Primary | ICD-10-CM

## 2018-05-07 DIAGNOSIS — J42 CHRONIC BRONCHITIS, UNSPECIFIED CHRONIC BRONCHITIS TYPE: ICD-10-CM

## 2018-05-07 PROCEDURE — 99214 OFFICE O/P EST MOD 30 MIN: CPT | Mod: S$PBB,,, | Performed by: PHYSICIAN ASSISTANT

## 2018-05-07 PROCEDURE — A4216 STERILE WATER/SALINE, 10 ML: HCPCS | Mod: PO | Performed by: PHYSICIAN ASSISTANT

## 2018-05-07 PROCEDURE — 99999 PR PBB SHADOW E&M-EST. PATIENT-LVL IV: CPT | Mod: PBBFAC,,, | Performed by: PHYSICIAN ASSISTANT

## 2018-05-07 PROCEDURE — 93005 ELECTROCARDIOGRAM TRACING: CPT | Mod: PBBFAC | Performed by: INTERNAL MEDICINE

## 2018-05-07 PROCEDURE — 96365 THER/PROPH/DIAG IV INF INIT: CPT | Mod: PO

## 2018-05-07 PROCEDURE — 99213 OFFICE O/P EST LOW 20 MIN: CPT | Mod: PBBFAC,25,27 | Performed by: INTERNAL MEDICINE

## 2018-05-07 PROCEDURE — 99999 PR PBB SHADOW E&M-EST. PATIENT-LVL III: CPT | Mod: PBBFAC,,, | Performed by: INTERNAL MEDICINE

## 2018-05-07 PROCEDURE — 93010 ELECTROCARDIOGRAM REPORT: CPT | Mod: S$PBB,,, | Performed by: INTERNAL MEDICINE

## 2018-05-07 PROCEDURE — 99215 OFFICE O/P EST HI 40 MIN: CPT | Mod: S$PBB,,, | Performed by: INTERNAL MEDICINE

## 2018-05-07 PROCEDURE — 63600175 PHARM REV CODE 636 W HCPCS: Mod: PO | Performed by: PHYSICIAN ASSISTANT

## 2018-05-07 PROCEDURE — 99214 OFFICE O/P EST MOD 30 MIN: CPT | Mod: PBBFAC,PO,25 | Performed by: PHYSICIAN ASSISTANT

## 2018-05-07 PROCEDURE — 25000003 PHARM REV CODE 250: Mod: PO | Performed by: PHYSICIAN ASSISTANT

## 2018-05-07 RX ORDER — SODIUM CHLORIDE 0.9 % (FLUSH) 0.9 %
10 SYRINGE (ML) INJECTION
Status: DISCONTINUED | OUTPATIENT
Start: 2018-05-07 | End: 2018-05-07 | Stop reason: HOSPADM

## 2018-05-07 RX ORDER — ZOLEDRONIC ACID 5 MG/100ML
5 INJECTION, SOLUTION INTRAVENOUS
Status: CANCELLED | OUTPATIENT
Start: 2018-05-07

## 2018-05-07 RX ORDER — SODIUM CHLORIDE 0.9 % (FLUSH) 0.9 %
10 SYRINGE (ML) INJECTION
Status: CANCELLED | OUTPATIENT
Start: 2018-05-07

## 2018-05-07 RX ORDER — ZOLEDRONIC ACID 5 MG/100ML
5 INJECTION, SOLUTION INTRAVENOUS
Status: COMPLETED | OUTPATIENT
Start: 2018-05-07 | End: 2018-05-07

## 2018-05-07 RX ADMIN — ZOLEDRONIC ACID 5 MG: 5 INJECTION, SOLUTION INTRAVENOUS at 10:05

## 2018-05-07 RX ADMIN — SODIUM CHLORIDE, PRESERVATIVE FREE 10 ML: 5 INJECTION INTRAVENOUS at 10:05

## 2018-05-07 NOTE — PATIENT INSTRUCTIONS
Double lasix to 40mg twice a day for next three days, eat bananas, WIll check BMP on Thursday. Also start using compression stockings.

## 2018-05-07 NOTE — TELEPHONE ENCOUNTER
Patient presented to [facility] with symptoms of fluid build up and increasing SOB over the last 5 days.  I advised that patient should go to ER, but she declined saying she wanted to see her rheumatologist today and then wants to see a cardiologist.  Dr. Denis did not have openings, so I offered next available at [facility] with Dr. Arango.

## 2018-05-07 NOTE — PROGRESS NOTES
Infusion # 5 - Reclast 5 mg q 1 year    Any invasive dental procedures in past 3 months or upcoming 3 months: denies    Recent labs? 5/7/18  Last Rheumatology provider visit- Seen by COLLIN Jean on 5/7/18     Premeds? none     Reclast 5 mg administered IV at a 20 minute rate per orders; see MAR and vitals for more  details. Tolerated well without adverse events, discharged and ambulatory out of clinic.

## 2018-05-07 NOTE — PATIENT INSTRUCTIONS
Reclast (zoledronic acid) injection (Paget's Disease, Osteoporosis)  What is this medicine?  ZOLEDRONIC ACID (MAIKOL le dron ik AS id) lowers the amount of calcium loss from bone. It is used to treat Paget's disease and osteoporosis in women.  How should I use this medicine?  This medicine is for infusion into a vein. It is given by a health care professional in a hospital or clinic setting.  Talk to your pediatrician regarding the use of this medicine in children. This medicine is not approved for use in children.  What side effects may I notice from receiving this medicine?  Side effects that you should report to your doctor or health care professional as soon as possible:  · allergic reactions like skin rash, itching or hives, swelling of the face, lips, or tongue  · anxiety, confusion, or depression  · breathing problems  · changes in vision  · eye pain  · feeling faint or lightheaded, falls  · jaw pain, especially after dental work  · mouth sores  · muscle cramps, stiffness, or weakness  · redness, blistering, peeling or loosening of the skin, including inside the mouth  · trouble passing urine or change in the amount of urine  Side effects that usually do not require medical attention (report to your doctor or health care professional if they continue or are bothersome):  · bone, joint, or muscle pain  · constipation  · diarrhea  · fever  · hair loss  · irritation at site where injected  · loss of appetite  · nausea, vomiting  · stomach upset  · trouble sleeping  · trouble swallowing  · weak or tired  What may interact with this medicine?  · certain antibiotics given by injection  · NSAIDs, medicines for pain and inflammation, like ibuprofen or naproxen  · some diuretics like bumetanide, furosemide  · teriparatide  What if I miss a dose?  It is important not to miss your dose. Call your doctor or health care professional if you are unable to keep an appointment.  Where should I keep my medicine?  This drug is  given in a hospital or clinic and will not be stored at home.  What should I tell my health care provider before I take this medicine?  They need to know if you have any of these conditions:  · aspirin-sensitive asthma  · cancer, especially if you are receiving medicines used to treat cancer  · dental disease or wear dentures  · infection  · kidney disease  · low levels of calcium in the blood  · past surgery on the parathyroid gland or intestines  · receiving corticosteroids like dexamethasone or prednisone  · an unusual or allergic reaction to zoledronic acid, other medicines, foods, dyes, or preservatives  · pregnant or trying to get pregnant  · breast-feeding  What should I watch for while using this medicine?  Visit your doctor or health care professional for regular checkups. It may be some time before you see the benefit from this medicine. Do not stop taking your medicine unless your doctor tells you to. Your doctor may order blood tests or other tests to see how you are doing.  Women should inform their doctor if they wish to become pregnant or think they might be pregnant. There is a potential for serious side effects to an unborn child. Talk to your health care professional or pharmacist for more information.  You should make sure that you get enough calcium and vitamin D while you are taking this medicine. Discuss the foods you eat and the vitamins you take with your health care professional.  Some people who take this medicine have severe bone, joint, and/or muscle pain. This medicine may also increase your risk for jaw problems or a broken thigh bone. Tell your doctor right away if you have severe pain in your jaw, bones, joints, or muscles. Tell your doctor if you have any pain that does not go away or that gets worse.  Tell your dentist and dental surgeon that you are taking this medicine. You should not have major dental surgery while on this medicine. See your dentist to have a dental exam and fix  any dental problems before starting this medicine. Take good care of your teeth while on this medicine. Make sure you see your dentist for regular follow-up appointments.  NOTE:This sheet is a summary. It may not cover all possible information. If you have questions about this medicine, talk to your doctor, pharmacist, or health care provider. Copyright© 2017 [employer]

## 2018-05-07 NOTE — PROGRESS NOTES
Subjective:   Patient ID:  Carmela Maria is a 89 y.o. female who presents for cardiac consult of Congestive Heart Failure (swelling)      HPI  The patient came in today for cardiac consult of Congestive Heart Failure (swelling)    This is a 89 year old female pt with PMHx Severe AS - mean gradient 93 mmHg, diastolic HF, COPD, CML presents for follow up visit.   11/2016 - Last seen by Dr. Jeffers, opted for conservative therapy for severe AS.     Pt has SOB, increased LE edema along with chest pain. SOB/CRYSTAL worsened over past 3 months. Has severe SOB with walking min distance. Had infusion for osteoporosis today. She still wants only conservative therapy for severe AS. She declined to go to [place] or [place] for TAVR a few years back and has been managing symptoms with medications. She has decreased urination lately with lasix 20mg, discussed will increase to 40mg BID for 3 days but has to be careful with BP as low BP with overdiuresis can cause her to have syncope or other events. LE edema worse as well has not been using compression stockings, wants a new script.     Patient feels no palpitation, no dizziness, no syncope, no CNS symptoms.    Patient is compliant with medications.    2D ECHO 10/2016  CONCLUSIONS     1 - Severe left atrial enlargement.     2 - Concentric hypertrophy.     3 - Normal left ventricular systolic function (EF 55-60%).     4 - Left ventricular diastolic dysfunction.     5 - Normal right ventricular systolic function .     6 - The estimated PA systolic pressure is 37 mmHg.     7 - Trivial to mild mitral regurgitation.     8 - Trivial to mild tricuspid regurgitation.   The peak gradient obtained across the aortic valve is 123.0 mmHg, with a mean gradient of 93.0 mmHg. Using a left ventricular outflow tract diameter of 2.1 cm, a left ventricular outflow tract velocity time integral of 30 cm, and a peak   instantaneous transvalvular velocity time integral of 141 cm, the calculated aortic  valve area is 0.74 cm2. Aortic valve is normal in structure with normal leaflet mobility.     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 Unruly Ball    CHF (congestive heart failure)     Clotting disorder     COPD (chronic obstructive pulmonary disease)     Coronary artery disease     CVA (cerebral infarction) 2013    Depression     Fall 2013    Heart murmur     Hyperlipidemia     Osteoporosis     Palpitation 2015    Sleep apnea     Traumatic brain injury 2013    Ulcer        Past Surgical History:   Procedure Laterality Date    APPENDECTOMY       SECTION      TUBAL LIGATION         Social History   Substance Use Topics    Smoking status: Never Smoker    Smokeless tobacco: Never Used    Alcohol use No       Family History   Problem Relation Age of Onset    COPD Mother     Heart disease Father     Cancer Sister     COPD Sister     COPD Brother        Patient's Medications   New Prescriptions    No medications on file   Previous Medications    ALBUTEROL 90 MCG/ACTUATION INHALER    Inhale 2 puffs into the lungs every 6 (six) hours.    ALBUTEROL-IPRATROPIUM 2.5MG-0.5MG/3ML (DUO-NEB) 0.5 MG-3 MG(2.5 MG BASE)/3 ML NEBULIZER SOLUTION    USE 1 VIAL VIA NEBULIZER EVERY 6 HOURS WHILE AWAKE    BUDESONIDE (PULMICORT) 0.5 MG/2 ML NEBULIZER SOLUTION    Take 2 mLs (0.5 mg total) by nebulization 2 (two) times daily.    CALCIUM CARBONATE (OS-KASIA) 600 MG (1,500 MG) TAB    Take 600 mg by mouth once daily.    CHOLECALCIFEROL, VITAMIN D3, (VITAMIN D3) 400 UNIT CHEW    Take by mouth. Take  tues - thurs - sat    CICLOPIROX (PENLAC) 8 % SOLN    Apply to affected toenails at night time DAILY. On  day, file nails down, clean all nails with alcohol and  "restart application process.    CITALOPRAM (CELEXA) 40 MG TABLET    TAKE 1 TABLET DAILY    DASATINIB (SPRYCEL) 50 MG TABLET    Take 1 tablet (50 mg total) by mouth every other day.    FUROSEMIDE (LASIX) 20 MG TABLET    Take 1 tablet (20 mg total) by mouth 2 (two) times daily.    GUAIFENESIN (MUCINEX) 600 MG 12 HR TABLET    Take 1 tablet (600 mg total) by mouth 2 (two) times daily.    HYDROXYZINE PAMOATE (VISTARIL) 25 MG CAP    Take 1 capsule (25 mg total) by mouth nightly as needed (insomnia or anxiety).    RANEXA 500 MG TB12    TAKE 1 TABLET TWICE A DAY    RANITIDINE (ZANTAC) 150 MG TABLET    Take 1 tablet (150 mg total) by mouth 2 (two) times daily.   Modified Medications    No medications on file   Discontinued Medications    No medications on file       Review of Systems   Constitutional: Negative.    HENT: Negative.    Eyes: Negative.    Respiratory: Positive for shortness of breath.    Cardiovascular: Positive for chest pain and leg swelling. Negative for palpitations.   Gastrointestinal: Negative.    Genitourinary: Negative.    Musculoskeletal: Negative.    Skin: Negative.    Neurological: Negative.    Endo/Heme/Allergies: Negative.    Psychiatric/Behavioral: Negative.    All 12 systems otherwise negative.      Wt Readings from Last 3 Encounters:   05/07/18 62.4 kg (137 lb 9.1 oz)   05/07/18 62 kg (136 lb 11 oz)   05/07/18 62 kg (136 lb 11 oz)     Temp Readings from Last 3 Encounters:   04/17/18 98.2 °F (36.8 °C) (Tympanic)   03/12/18 98.9 °F (37.2 °C) (Tympanic)   03/01/18 96.8 °F (36 °C) (Tympanic)     BP Readings from Last 3 Encounters:   05/07/18 (!) 116/56   05/07/18 112/65   05/07/18 130/76     Pulse Readings from Last 3 Encounters:   05/07/18 76   05/07/18 65   05/07/18 72       BP (!) 116/56 (BP Method: Large (Manual))   Pulse 76   Ht 5' 2" (1.575 m)   Wt 62.4 kg (137 lb 9.1 oz)   BMI 25.16 kg/m²     Objective:   Physical Exam   Constitutional: She is oriented to person, place, and time. She " appears well-developed and well-nourished. No distress.   HENT:   Head: Normocephalic and atraumatic.   Nose: Nose normal.   Mouth/Throat: Oropharynx is clear and moist.   Eyes: Conjunctivae and EOM are normal. No scleral icterus.   Neck: Normal range of motion. Neck supple. JVD present. No thyromegaly present.   Cardiovascular: Normal rate, regular rhythm, S1 normal and S2 normal.  Exam reveals no gallop, no S3, no S4 and no friction rub.    Murmur heard.   Harsh midsystolic murmur is present with a grade of 4/6  at the upper right sternal border radiating to the neck  Pulmonary/Chest: Effort normal. No stridor. No respiratory distress. She has no wheezes. She has rales. She exhibits no tenderness.   Abdominal: Soft. Bowel sounds are normal. She exhibits no distension and no mass. There is no tenderness. There is no rebound.   Genitourinary:   Genitourinary Comments: Deferred   Musculoskeletal: Normal range of motion. She exhibits no tenderness or deformity. Edema: 2+   Lymphadenopathy:     She has no cervical adenopathy.   Neurological: She is alert and oriented to person, place, and time. She exhibits normal muscle tone. Coordination normal.   Skin: Skin is warm and dry. No rash noted. She is not diaphoretic. No erythema. No pallor.   Psychiatric: She has a normal mood and affect. Her behavior is normal. Judgment and thought content normal.   Nursing note and vitals reviewed.      Lab Results   Component Value Date     05/07/2018    K 4.3 05/07/2018    CL 99 05/07/2018    CO2 34 (H) 05/07/2018    BUN 21 05/07/2018    CREATININE 0.8 05/07/2018    GLU 96 05/07/2018    MG 1.8 09/03/2014    AST 17 05/07/2018    ALT 11 05/07/2018    ALBUMIN 3.6 05/07/2018    PROT 6.5 05/07/2018    BILITOT 0.4 05/07/2018    WBC 5.41 02/21/2018    HGB 11.0 (L) 02/21/2018    HCT 34.8 (L) 02/21/2018     (H) 02/21/2018     (L) 02/21/2018    INR 1.0 08/19/2017    TSH 1.771 12/08/2016    CHOL 169 11/10/2015    HDL 55  11/10/2015    LDLCALC 90.2 11/10/2015    TRIG 119 11/10/2015     (H) 02/26/2018     Assessment:      1. Acute on chronic diastolic heart failure    2. Nonrheumatic aortic valve stenosis    3. Chronic bronchitis, unspecified chronic bronchitis type    4. Localized edema        Plan:   1. Acute on chronic diastolic HF  - will increase lasix to 40 mg BID x 3 days  - check BMP on Thurs  - low salt diet  - check CXR     2. Severe AS, mean gradient 83 mmHg  - repeat 2D echo   - pt wanted conservative therapy in past and continues to do so now  - discussed to avoid hypotension but eventually pt will have more HF symptoms     3. Edema  - compression stockings  - elevate legs  - will increase diuretics for 3 days    Thank you for allowing me to participate in this patient's care. Please do not hesitate to contact me with any questions or concerns. Consult note has been forwarded to the referral physician.

## 2018-05-08 ENCOUNTER — PATIENT MESSAGE (OUTPATIENT)
Dept: CARDIOLOGY | Facility: CLINIC | Age: 83
End: 2018-05-08

## 2018-05-09 ENCOUNTER — CLINICAL SUPPORT (OUTPATIENT)
Dept: CARDIOLOGY | Facility: CLINIC | Age: 83
End: 2018-05-09
Attending: INTERNAL MEDICINE
Payer: MEDICARE

## 2018-05-09 ENCOUNTER — LAB VISIT (OUTPATIENT)
Dept: LAB | Facility: HOSPITAL | Age: 83
End: 2018-05-09
Attending: INTERNAL MEDICINE
Payer: MEDICARE

## 2018-05-09 DIAGNOSIS — I50.33 ACUTE ON CHRONIC DIASTOLIC HEART FAILURE: ICD-10-CM

## 2018-05-09 LAB
ANION GAP SERPL CALC-SCNC: 7 MMOL/L
AORTIC VALVE STENOSIS: ABNORMAL
BUN SERPL-MCNC: 21 MG/DL
CALCIUM SERPL-MCNC: 9.2 MG/DL
CHLORIDE SERPL-SCNC: 97 MMOL/L
CO2 SERPL-SCNC: 36 MMOL/L
CREAT SERPL-MCNC: 0.9 MG/DL
DIASTOLIC DYSFUNCTION: YES
EST. GFR  (AFRICAN AMERICAN): >60 ML/MIN/1.73 M^2
EST. GFR  (NON AFRICAN AMERICAN): 56.1 ML/MIN/1.73 M^2
ESTIMATED PA SYSTOLIC PRESSURE: 42.25
GLUCOSE SERPL-MCNC: 86 MG/DL
MITRAL VALVE REGURGITATION: ABNORMAL
POTASSIUM SERPL-SCNC: 4.6 MMOL/L
RETIRED EF AND QEF - SEE NOTES: 55 (ref 55–65)
SODIUM SERPL-SCNC: 140 MMOL/L
TRICUSPID VALVE REGURGITATION: ABNORMAL

## 2018-05-09 PROCEDURE — 93306 TTE W/DOPPLER COMPLETE: CPT | Mod: PBBFAC,PO | Performed by: INTERNAL MEDICINE

## 2018-05-09 PROCEDURE — 36415 COLL VENOUS BLD VENIPUNCTURE: CPT | Mod: PO

## 2018-05-09 PROCEDURE — 80048 BASIC METABOLIC PNL TOTAL CA: CPT

## 2018-05-10 ENCOUNTER — TELEPHONE (OUTPATIENT)
Dept: CARDIOLOGY | Facility: CLINIC | Age: 83
End: 2018-05-10

## 2018-05-10 NOTE — TELEPHONE ENCOUNTER
Faxed labs done by Dr. Arango on 5/9/18, echo and  last office note--called to notify Coleen--unavailable and unable to leave message

## 2018-05-10 NOTE — TELEPHONE ENCOUNTER
----- Message from Nae Lockhart sent at 5/10/2018 12:08 PM CDT -----  Contact: Dr. Unruly Ball ([facility])  Calling in regards to lab results from Monday as well and please advise  Fax # [phone] and ph # [phone]

## 2018-05-14 ENCOUNTER — TELEPHONE (OUTPATIENT)
Dept: CARDIOLOGY | Facility: CLINIC | Age: 83
End: 2018-05-14

## 2018-05-14 NOTE — TELEPHONE ENCOUNTER
Called to patient and gave results of echo--patient verbalizes understanding--states already aware of heart condition--states please call son Luis Antonio with any test results--states does not wish to know

## 2018-05-14 NOTE — TELEPHONE ENCOUNTER
----- Message from Camden Arango MD sent at 5/10/2018  9:08 AM CDT -----  Please call the patient regarding her abnormal result. There is severe aortic stenosis as in the past with significant heart failure with normal EF, would continue close monitoring and avoiding extra fluid/salt.

## 2018-05-21 ENCOUNTER — PATIENT MESSAGE (OUTPATIENT)
Dept: CARDIOLOGY | Facility: CLINIC | Age: 83
End: 2018-05-21

## 2018-05-23 ENCOUNTER — OFFICE VISIT (OUTPATIENT)
Dept: CARDIOLOGY | Facility: CLINIC | Age: 83
End: 2018-05-23
Payer: MEDICARE

## 2018-05-23 VITALS
HEIGHT: 62 IN | BODY MASS INDEX: 24.34 KG/M2 | HEART RATE: 68 BPM | SYSTOLIC BLOOD PRESSURE: 120 MMHG | WEIGHT: 132.25 LBS | DIASTOLIC BLOOD PRESSURE: 76 MMHG

## 2018-05-23 DIAGNOSIS — J42 CHRONIC BRONCHITIS, UNSPECIFIED CHRONIC BRONCHITIS TYPE: ICD-10-CM

## 2018-05-23 DIAGNOSIS — I35.0 SEVERE AORTIC STENOSIS: Primary | ICD-10-CM

## 2018-05-23 DIAGNOSIS — L60.8 DISCOLORATION AND THICKENING OF NAILS BOTH FEET: ICD-10-CM

## 2018-05-23 DIAGNOSIS — C92.10 CML (CHRONIC MYELOCYTIC LEUKEMIA): ICD-10-CM

## 2018-05-23 DIAGNOSIS — I50.33 ACUTE ON CHRONIC DIASTOLIC HEART FAILURE: ICD-10-CM

## 2018-05-23 DIAGNOSIS — M79.89 LEG SWELLING: ICD-10-CM

## 2018-05-23 PROCEDURE — 99999 PR PBB SHADOW E&M-EST. PATIENT-LVL III: CPT | Mod: PBBFAC,,, | Performed by: INTERNAL MEDICINE

## 2018-05-23 PROCEDURE — 99214 OFFICE O/P EST MOD 30 MIN: CPT | Mod: S$PBB,,, | Performed by: INTERNAL MEDICINE

## 2018-05-23 PROCEDURE — 99213 OFFICE O/P EST LOW 20 MIN: CPT | Mod: PBBFAC,PO | Performed by: INTERNAL MEDICINE

## 2018-05-23 NOTE — PROGRESS NOTES
Subjective:   Patient ID:  Carmela Maria is a 89 y.o. female who presents for follow up of Palpitations; Aortic Stenosis; Follow-up; and Shortness of Breath      HPI  A 89 yo female with cml copd sevre aortic stenosis is here for f/u with shortness of breath and leg swelling she is very limited with activitry she is very sleepy during the day. She  Has decreased urine output  She is compliant with diet has not been taking diuretics daily she is eating some tv dinners has extra  salt. She is very short of breath . She has lost weight . She sleeps in recliner tries to keep her feet up. Her swelling is improved though she is tired.  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 Unruly Ball    CHF (congestive heart failure)     Clotting disorder     COPD (chronic obstructive pulmonary disease)     Coronary artery disease     CVA (cerebral infarction) 2013    Depression     Fall 2013    Heart murmur     Hyperlipidemia     Osteoporosis     Palpitation 2015    Sleep apnea     Traumatic brain injury 2013    Ulcer        Past Surgical History:   Procedure Laterality Date    APPENDECTOMY       SECTION      TUBAL LIGATION         Social History   Substance Use Topics    Smoking status: Never Smoker    Smokeless tobacco: Never Used    Alcohol use No       Family History   Problem Relation Age of Onset    COPD Mother     Heart disease Father     Cancer Sister     COPD Sister     COPD Brother        Current Outpatient Prescriptions   Medication Sig    albuterol 90 mcg/actuation inhaler Inhale 2 puffs into the lungs every 6 (six) hours.    albuterol-ipratropium 2.5mg-0.5mg/3mL (DUO-NEB) 0.5 mg-3 mg(2.5 mg base)/3 mL nebulizer solution USE 1 VIAL VIA NEBULIZER EVERY 6 HOURS WHILE AWAKE    budesonide (PULMICORT) 0.5 mg/2 mL nebulizer  solution Take 2 mLs (0.5 mg total) by nebulization 2 (two) times daily.    calcium carbonate (OS-KASIA) 600 mg (1,500 mg) Tab Take 600 mg by mouth once daily.    cholecalciferol, vitamin D3, (VITAMIN D3) 400 unit Chew Take by mouth. Take  tues - thurs - sat    ciclopirox (PENLAC) 8 % Soln Apply to affected toenails at night time DAILY. On 7th day, file nails down, clean all nails with alcohol and restart application process.    citalopram (CELEXA) 40 MG tablet TAKE 1 TABLET DAILY    dasatinib (SPRYCEL) 50 MG tablet Take 1 tablet (50 mg total) by mouth every other day.    furosemide (LASIX) 20 MG tablet Take 1 tablet (20 mg total) by mouth 2 (two) times daily. (Patient taking differently: Take 20 mg by mouth 2 (two) times daily. Pt takes two tablets once a day)    guaifenesin (MUCINEX) 600 mg 12 hr tablet Take 1 tablet (600 mg total) by mouth 2 (two) times daily.    hydrOXYzine pamoate (VISTARIL) 25 MG Cap Take 1 capsule (25 mg total) by mouth nightly as needed (insomnia or anxiety).    RANEXA 500 mg Tb12 TAKE 1 TABLET TWICE A DAY    ranitidine (ZANTAC) 150 MG tablet Take 1 tablet (150 mg total) by mouth 2 (two) times daily.     No current facility-administered medications for this visit.      Current Outpatient Prescriptions on File Prior to Visit   Medication Sig    albuterol 90 mcg/actuation inhaler Inhale 2 puffs into the lungs every 6 (six) hours.    albuterol-ipratropium 2.5mg-0.5mg/3mL (DUO-NEB) 0.5 mg-3 mg(2.5 mg base)/3 mL nebulizer solution USE 1 VIAL VIA NEBULIZER EVERY 6 HOURS WHILE AWAKE    budesonide (PULMICORT) 0.5 mg/2 mL nebulizer solution Take 2 mLs (0.5 mg total) by nebulization 2 (two) times daily.    calcium carbonate (OS-KASIA) 600 mg (1,500 mg) Tab Take 600 mg by mouth once daily.    cholecalciferol, vitamin D3, (VITAMIN D3) 400 unit Chew Take by mouth. Take  tues - thurs - sat    ciclopirox (PENLAC) 8 % Soln Apply to affected toenails at night time DAILY. On 7th day, file nails  down, clean all nails with alcohol and restart application process.    citalopram (CELEXA) 40 MG tablet TAKE 1 TABLET DAILY    dasatinib (SPRYCEL) 50 MG tablet Take 1 tablet (50 mg total) by mouth every other day.    furosemide (LASIX) 20 MG tablet Take 1 tablet (20 mg total) by mouth 2 (two) times daily. (Patient taking differently: Take 20 mg by mouth 2 (two) times daily. Pt takes two tablets once a day)    guaifenesin (MUCINEX) 600 mg 12 hr tablet Take 1 tablet (600 mg total) by mouth 2 (two) times daily.    hydrOXYzine pamoate (VISTARIL) 25 MG Cap Take 1 capsule (25 mg total) by mouth nightly as needed (insomnia or anxiety).    RANEXA 500 mg Tb12 TAKE 1 TABLET TWICE A DAY    ranitidine (ZANTAC) 150 MG tablet Take 1 tablet (150 mg total) by mouth 2 (two) times daily.     No current facility-administered medications on file prior to visit.      Review of patient's allergies indicates:   Allergen Reactions    Asmanex twisthaler  [mometasone]      Other reaction(s): Rhinitis  Other reaction(s): Headache  Other reaction(s): Eye irritation    Aspirin      Other reaction(s): Unknown    Brovana  [arformoterol] Nausea Only     Other reaction(s): Headache  Other reaction(s): Dry Nose    Butisol  [butabarbital]      Other reaction(s): Unknown    Codeine      Other reaction(s): Unknown    Diazepam      Other reaction(s): Unknown    Iodine      Other reaction(s): Unknown    Limbitrol  [amitriptyline-chlordiazepoxide]      Other reaction(s): Unknown  Other reaction(s): Unknown    Medrol  [methylprednisolone]      Other reaction(s): Shortness of breath  Other reaction(s): Shortness of breath    Pcn [penicillins]     Sulfa (sulfonamide antibiotics)      Other reaction(s): Unknown       Review of Systems   Constitution: Positive for weakness, malaise/fatigue and weight loss. Negative for diaphoresis and weight gain.   HENT: Negative for hoarse voice.    Eyes: Negative for double vision and visual disturbance.    Cardiovascular: Positive for dyspnea on exertion and leg swelling. Negative for chest pain, claudication, cyanosis, irregular heartbeat, near-syncope, orthopnea, palpitations, paroxysmal nocturnal dyspnea and syncope.   Respiratory: Positive for shortness of breath. Negative for cough, hemoptysis and snoring.    Hematologic/Lymphatic: Negative for bleeding problem. Does not bruise/bleed easily.   Skin: Negative for color change and poor wound healing.   Musculoskeletal: Negative for muscle cramps, muscle weakness and myalgias.   Gastrointestinal: Negative for bloating, abdominal pain, change in bowel habit, diarrhea, heartburn, hematemesis, hematochezia, melena and nausea.   Genitourinary: Positive for nocturia.   Neurological: Positive for loss of balance. Negative for excessive daytime sleepiness, dizziness, headaches, light-headedness and numbness.   Psychiatric/Behavioral: Negative for memory loss. The patient does not have insomnia.    Allergic/Immunologic: Negative for hives.       Objective:   Physical Exam   Constitutional: She is oriented to person, place, and time. She appears well-developed and well-nourished. She does not appear ill. No distress.   Looks debilitated and deconditioned.   HENT:   Head: Normocephalic and atraumatic.   Eyes: EOM are normal. Pupils are equal, round, and reactive to light. No scleral icterus.   Neck: Normal range of motion. Neck supple. Normal carotid pulses, no hepatojugular reflux and no JVD present. Carotid bruit is not present. No tracheal deviation present. No thyromegaly present.   Cardiovascular: Normal rate, regular rhythm, intact distal pulses and normal pulses.  Exam reveals gallop and S4. Exam reveals no friction rub.    Murmur heard.   Harsh midsystolic murmur is present with a grade of 3/6  at the upper right sternal border radiating to the neck  Pulses:       Carotid pulses are 2+ on the right side, and 2+ on the left side.       Radial pulses are 2+ on the  "right side, and 2+ on the left side.        Femoral pulses are 2+ on the right side, and 2+ on the left side.       Popliteal pulses are 2+ on the right side, and 2+ on the left side.        Dorsalis pedis pulses are 2+ on the right side, and 2+ on the left side.        Posterior tibial pulses are 2+ on the right side, and 2+ on the left side.   Absent s2   Pulmonary/Chest: Effort normal and breath sounds normal. No respiratory distress. She has no wheezes. She has no rhonchi. She has no rales. She exhibits no tenderness.   Poor air entry hyperinflated lungs.   Abdominal: Soft. Normal appearance, normal aorta and bowel sounds are normal. She exhibits no distension, no abdominal bruit, no ascites and no pulsatile midline mass. There is no hepatomegaly. There is no tenderness.   Musculoskeletal: Edema: trace.        Right shoulder: She exhibits no deformity.   Neurological: She is alert and oriented to person, place, and time. She has normal strength. No cranial nerve deficit. Coordination normal.   Skin: Skin is warm and dry. No rash noted. She is not diaphoretic. No cyanosis or erythema. Nails show no clubbing.   Dry skin in feet with scaling and rubor from venous congestion   Psychiatric: She has a normal mood and affect. Her speech is normal and behavior is normal.     Vitals:    05/23/18 1513 05/23/18 1518   BP: 118/70 120/76   BP Location: Right arm Left arm   Patient Position: Sitting Sitting   BP Method: Large (Manual) Large (Manual)   Pulse: 68    Weight: 60 kg (132 lb 4.4 oz)    Height: 5' 2" (1.575 m)      Lab Results   Component Value Date    CHOL 169 11/10/2015    CHOL 165 05/13/2015    CHOL 147 05/09/2014     Lab Results   Component Value Date    HDL 55 11/10/2015    HDL 52 05/13/2015    HDL 46 05/09/2014     Lab Results   Component Value Date    LDLCALC 90.2 11/10/2015    LDLCALC 94.8 05/13/2015    LDLCALC 77.0 05/09/2014     Lab Results   Component Value Date    TRIG 119 11/10/2015    TRIG 91 " 05/13/2015    TRIG 120 05/09/2014     Lab Results   Component Value Date    CHOLHDL 32.5 11/10/2015    CHOLHDL 31.5 05/13/2015    CHOLHDL 31.3 05/09/2014       Chemistry        Component Value Date/Time     05/09/2018 1344    K 4.6 05/09/2018 1344    CL 97 05/09/2018 1344    CO2 36 (H) 05/09/2018 1344    BUN 21 05/09/2018 1344    CREATININE 0.9 05/09/2018 1344    GLU 86 05/09/2018 1344        Component Value Date/Time    CALCIUM 9.2 05/09/2018 1344    ALKPHOS 59 05/07/2018 0956    AST 17 05/07/2018 0956    ALT 11 05/07/2018 0956    BILITOT 0.4 05/07/2018 0956    ESTGFRAFRICA >60.0 05/09/2018 1344    EGFRNONAA 56.1 (A) 05/09/2018 1344          Lab Results   Component Value Date    TSH 1.771 12/08/2016     Lab Results   Component Value Date    INR 1.0 08/19/2017    INR 1.0 06/07/2013    INR 1.0 03/26/2010     Lab Results   Component Value Date    WBC 5.41 02/21/2018    HGB 11.0 (L) 02/21/2018    HCT 34.8 (L) 02/21/2018     (H) 02/21/2018     (L) 02/21/2018     BMP  Sodium   Date Value Ref Range Status   05/09/2018 140 136 - 145 mmol/L Final     Potassium   Date Value Ref Range Status   05/09/2018 4.6 3.5 - 5.1 mmol/L Final     Chloride   Date Value Ref Range Status   05/09/2018 97 95 - 110 mmol/L Final     CO2   Date Value Ref Range Status   05/09/2018 36 (H) 23 - 29 mmol/L Final     BUN, Bld   Date Value Ref Range Status   05/09/2018 21 10 - 30 mg/dL Final     Creatinine   Date Value Ref Range Status   05/09/2018 0.9 0.5 - 1.4 mg/dL Final     Calcium   Date Value Ref Range Status   05/09/2018 9.2 8.7 - 10.5 mg/dL Final     Anion Gap   Date Value Ref Range Status   05/09/2018 7 (L) 8 - 16 mmol/L Final     eGFR if    Date Value Ref Range Status   05/09/2018 >60.0 >60 mL/min/1.73 m^2 Final     eGFR if non    Date Value Ref Range Status   05/09/2018 56.1 (A) >60 mL/min/1.73 m^2 Final     Comment:     Calculation used to obtain the estimated glomerular filtration  rate  (eGFR) is the CKD-EPI equation.        CrCl cannot be calculated (Patient's most recent lab result is older than the maximum 7 days allowed.).    Assessment:     1. Severe aortic stenosis    2. CML (chronic myelocytic leukemia)    3. Chronic bronchitis, unspecified chronic bronchitis type    4. Discoloration and thickening of nails both feet    5. Acute on chronic diastolic heart failure    6. Leg swelling      Appears to be well compensated. She needs to keep the happy medium going with salt and  Diuretics.    Plan:   Continue current therapy  Cardiac low salt diet.  Risk factor modification and excercise program.  Keep appointment with DR BRANCH

## 2018-06-04 ENCOUNTER — OFFICE VISIT (OUTPATIENT)
Dept: INTERNAL MEDICINE | Facility: CLINIC | Age: 83
End: 2018-06-04
Payer: MEDICARE

## 2018-06-04 VITALS
DIASTOLIC BLOOD PRESSURE: 58 MMHG | WEIGHT: 130.5 LBS | HEART RATE: 86 BPM | BODY MASS INDEX: 23.87 KG/M2 | SYSTOLIC BLOOD PRESSURE: 116 MMHG | TEMPERATURE: 98 F | OXYGEN SATURATION: 92 %

## 2018-06-04 DIAGNOSIS — L98.9 SKIN LESION: Primary | ICD-10-CM

## 2018-06-04 DIAGNOSIS — C92.01: ICD-10-CM

## 2018-06-04 PROCEDURE — 99213 OFFICE O/P EST LOW 20 MIN: CPT | Mod: S$PBB,,, | Performed by: INTERNAL MEDICINE

## 2018-06-04 PROCEDURE — 99999 PR PBB SHADOW E&M-EST. PATIENT-LVL III: CPT | Mod: PBBFAC,,, | Performed by: INTERNAL MEDICINE

## 2018-06-04 PROCEDURE — 99213 OFFICE O/P EST LOW 20 MIN: CPT | Mod: PBBFAC,PN | Performed by: INTERNAL MEDICINE

## 2018-06-04 NOTE — PROGRESS NOTES
Subjective:      Patient ID: Carmela Maria is a 89 y.o. female.    Chief Complaint: Pain      HPI     Ms. Carmela Maria is a patient of Aden Jones MD, who presents for mild pain in buttock, which started after the pad adhesive accidentally tore a little skin up.       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 Unruly Ball    CHF (congestive heart failure)     Clotting disorder     COPD (chronic obstructive pulmonary disease)     Coronary artery disease     CVA (cerebral infarction) 2013    Depression     Fall 2013    Heart murmur     Hyperlipidemia     Osteoporosis     Palpitation 2015    Sleep apnea     Traumatic brain injury 2013    Ulcer      Past Surgical History:   Procedure Laterality Date    APPENDECTOMY       SECTION      TUBAL LIGATION       Social History     Social History    Marital status:      Spouse name: N/A    Number of children: N/A    Years of education: N/A     Occupational History    Not on file.     Social History Main Topics    Smoking status: Never Smoker    Smokeless tobacco: Never Used    Alcohol use No    Drug use: No    Sexual activity: No     Other Topics Concern    Not on file     Social History Narrative    No narrative on file     Family History   Problem Relation Age of Onset    COPD Mother     Heart disease Father     Cancer Sister     COPD Sister     COPD Brother        Current Outpatient Prescriptions:     albuterol 90 mcg/actuation inhaler, Inhale 2 puffs into the lungs every 6 (six) hours., Disp: 1 Inhaler, Rfl: 12    albuterol-ipratropium 2.5mg-0.5mg/3mL (DUO-NEB) 0.5 mg-3 mg(2.5 mg base)/3 mL nebulizer solution, USE 1 VIAL VIA NEBULIZER EVERY 6 HOURS WHILE AWAKE, Disp: 1080 mL, Rfl: 3    budesonide (PULMICORT) 0.5 mg/2 mL nebulizer solution, Take 2 mLs (0.5  mg total) by nebulization 2 (two) times daily., Disp: 360 mL, Rfl: 3    cholecalciferol, vitamin D3, (VITAMIN D3) 400 unit Chew, Take by mouth. Take  tues - thurs - sat, Disp: , Rfl:     ciclopirox (PENLAC) 8 % Soln, Apply to affected toenails at night time DAILY. On 7th day, file nails down, clean all nails with alcohol and restart application process., Disp: 1 Bottle, Rfl: 10    citalopram (CELEXA) 40 MG tablet, TAKE 1 TABLET DAILY, Disp: 90 tablet, Rfl: 3    dasatinib (SPRYCEL) 50 MG tablet, Take 1 tablet (50 mg total) by mouth every other day., Disp: , Rfl:     furosemide (LASIX) 20 MG tablet, Take 1 tablet (20 mg total) by mouth 2 (two) times daily. (Patient taking differently: Take 20 mg by mouth 2 (two) times daily. Pt takes two tablets once a day), Disp: 180 tablet, Rfl: 3    RANEXA 500 mg Tb12, TAKE 1 TABLET TWICE A DAY, Disp: 180 tablet, Rfl: 2    ranitidine (ZANTAC) 150 MG tablet, Take 1 tablet (150 mg total) by mouth 2 (two) times daily., Disp: 180 tablet, Rfl: 3    calcium carbonate (OS-KASIA) 600 mg (1,500 mg) Tab, Take 600 mg by mouth once daily., Disp: , Rfl:     hydrOXYzine pamoate (VISTARIL) 25 MG Cap, Take 1 capsule (25 mg total) by mouth nightly as needed (insomnia or anxiety)., Disp: 30 capsule, Rfl: 0    neomycin-polymyxin-pramoxine (NEOSPORIN) 3.5-10,000-10 mg-unit-mg/gram Crea, Apply topically 2 (two) times daily., Disp: 14 g, Rfl: 0    Review of patient's allergies indicates:   Allergen Reactions    Asmanex twisthaler  [mometasone]      Other reaction(s): Rhinitis  Other reaction(s): Headache  Other reaction(s): Eye irritation    Aspirin      Other reaction(s): Unknown    Brovana  [arformoterol] Nausea Only     Other reaction(s): Headache  Other reaction(s): Dry Nose    Butisol  [butabarbital]      Other reaction(s): Unknown    Codeine      Other reaction(s): Unknown    Diazepam      Other reaction(s): Unknown    Iodine      Other reaction(s): Unknown    Limbitrol   [amitriptyline-chlordiazepoxide]      Other reaction(s): Unknown  Other reaction(s): Unknown    Medrol  [methylprednisolone]      Other reaction(s): Shortness of breath  Other reaction(s): Shortness of breath    Pcn [penicillins]     Sulfa (sulfonamide antibiotics)      Other reaction(s): Unknown        Review of Systems   Negative    Objective:     BP (!) 116/58 (BP Location: Left arm, Patient Position: Sitting, BP Method: Large (Automatic))   Pulse 86   Temp 97.5 °F (36.4 °C) (Tympanic)   Wt 59.2 kg (130 lb 8.2 oz)   SpO2 (!) 92%   BMI 23.87 kg/m²     Physical Exam  GEN: A&O fully, NAD  PSYC: Normal affect  DERM: 2- 1 cm lacerations on each buttock with a 1x1 cm peripheral erythema, but no cellulitis, no ulceration      Lab Results   Component Value Date    WBC 5.41 02/21/2018    HGB 11.0 (L) 02/21/2018    HCT 34.8 (L) 02/21/2018     (L) 02/21/2018    CHOL 169 11/10/2015    TRIG 119 11/10/2015    HDL 55 11/10/2015    LDLCALC 90.2 11/10/2015    ALT 11 05/07/2018    AST 17 05/07/2018     05/09/2018    K 4.6 05/09/2018    CL 97 05/09/2018    CREATININE 0.9 05/09/2018    BUN 21 05/09/2018    CO2 36 (H) 05/09/2018    TSH 1.771 12/08/2016    INR 1.0 08/19/2017    GLUF 103 05/03/2006       Assessment:      1. Skin lesion: Mild. Will tx conservatively for now. She understands to check every day and if getting worse to call us, at which time we may send a home health nurse to manage to prevent ulcer or cellulitis.   2. Leukemia, acute myeloid, in remission: Will check CBC in August to monitor.       Plan:   Skin lesion  -     neomycin-polymyxin-pramoxine (NEOSPORIN) 3.5-10,000-10 mg-unit-mg/gram Crea; Apply topically 2 (two) times daily.  Dispense: 14 g; Refill: 0    Leukemia, acute myeloid, in remission        Follow-up in about 2 months (around 8/4/2018), or if symptoms worsen or fail to improve, for FU on skin lesion.

## 2018-06-07 ENCOUNTER — TELEPHONE (OUTPATIENT)
Dept: INTERNAL MEDICINE | Facility: CLINIC | Age: 83
End: 2018-06-07

## 2018-06-07 NOTE — TELEPHONE ENCOUNTER
----- Message from Selena Fulton sent at 6/7/2018  4:28 PM CDT -----  Contact: self/[phone]  Returning call, please call back at [phone]. Thanks/ar

## 2018-06-07 NOTE — TELEPHONE ENCOUNTER
----- Message from Sadia Medina sent at 6/7/2018  3:07 PM CDT -----  Contact: pt son  Caller is requesting a call back from the nurse in regards to pt getting a home health nurse to come in for her.                                                 [phone]

## 2018-06-20 ENCOUNTER — PATIENT MESSAGE (OUTPATIENT)
Dept: INTERNAL MEDICINE | Facility: CLINIC | Age: 83
End: 2018-06-20

## 2018-06-27 ENCOUNTER — TELEPHONE (OUTPATIENT)
Dept: INTERNAL MEDICINE | Facility: CLINIC | Age: 83
End: 2018-06-27

## 2018-06-27 NOTE — TELEPHONE ENCOUNTER
----- Message from Janessa Kumar sent at 6/27/2018  8:53 AM CDT -----  Contact: Pt's Daughter in-law   ..PT is returning nurse call, request call back to discuss the paperwork she needs to fill out for the pt. .[phone]    
Pt's daughter was informed that provider wants pt to come in to fill out forms that were faxed. pts daughter voiced understanding./db**  
Calm

## 2018-06-28 ENCOUNTER — OFFICE VISIT (OUTPATIENT)
Dept: INTERNAL MEDICINE | Facility: CLINIC | Age: 83
End: 2018-06-28
Payer: MEDICARE

## 2018-06-28 VITALS
DIASTOLIC BLOOD PRESSURE: 58 MMHG | HEART RATE: 88 BPM | OXYGEN SATURATION: 99 % | HEIGHT: 62 IN | BODY MASS INDEX: 24.78 KG/M2 | WEIGHT: 134.69 LBS | SYSTOLIC BLOOD PRESSURE: 116 MMHG | RESPIRATION RATE: 18 BRPM | TEMPERATURE: 98 F

## 2018-06-28 DIAGNOSIS — G47.00 INSOMNIA, UNSPECIFIED TYPE: ICD-10-CM

## 2018-06-28 DIAGNOSIS — R10.9 STOMACH PAIN: Primary | ICD-10-CM

## 2018-06-28 DIAGNOSIS — F41.9 ANXIETY: ICD-10-CM

## 2018-06-28 PROCEDURE — 99214 OFFICE O/P EST MOD 30 MIN: CPT | Mod: PBBFAC,PN | Performed by: INTERNAL MEDICINE

## 2018-06-28 PROCEDURE — 99999 PR PBB SHADOW E&M-EST. PATIENT-LVL IV: CPT | Mod: PBBFAC,,, | Performed by: INTERNAL MEDICINE

## 2018-06-28 PROCEDURE — 99215 OFFICE O/P EST HI 40 MIN: CPT | Mod: S$PBB,,, | Performed by: INTERNAL MEDICINE

## 2018-06-28 NOTE — PROGRESS NOTES
Subjective:      Patient ID: Carmela Maria is a 89 y.o. female.    Chief Complaint: No chief complaint on file.      HPI     Ms. Carmela Maria is a patient of Aden Jones MD, who presents for stomach pain and nausea. She reports her pain is worse with pepper or tomato-based sauces. She has been taking her ranitidine 150 mg po bid. She is not on a PPI 2/2 Sprycel. She has tried hydroxyzine, which didn't help and may have caused her stomach to feel worse.       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 Unruly Ball    CHF (congestive heart failure)     Clotting disorder     COPD (chronic obstructive pulmonary disease)     Coronary artery disease     CVA (cerebral infarction) 2013    Depression     Fall 2013    Heart murmur     Hyperlipidemia     Osteoporosis 2017    Neck of femur (T score -2.5)    Palpitation 2015    Sleep apnea     Traumatic brain injury 2013    Ulcer      Past Surgical History:   Procedure Laterality Date    APPENDECTOMY       SECTION      TUBAL LIGATION       Social History     Social History    Marital status:      Spouse name: N/A    Number of children: N/A    Years of education: N/A     Occupational History    Not on file.     Social History Main Topics    Smoking status: Never Smoker    Smokeless tobacco: Never Used    Alcohol use No    Drug use: No    Sexual activity: No     Other Topics Concern    Not on file     Social History Narrative    No narrative on file     Family History   Problem Relation Age of Onset    COPD Mother     Heart disease Father     Cancer Sister     COPD Sister     COPD Brother        Current Outpatient Prescriptions:     albuterol 90 mcg/actuation inhaler, Inhale 2 puffs into the lungs every 6 (six) hours., Disp: 1 Inhaler, Rfl: 12     albuterol-ipratropium 2.5mg-0.5mg/3mL (DUO-NEB) 0.5 mg-3 mg(2.5 mg base)/3 mL nebulizer solution, USE 1 VIAL VIA NEBULIZER EVERY 6 HOURS WHILE AWAKE, Disp: 1080 mL, Rfl: 3    budesonide (PULMICORT) 0.5 mg/2 mL nebulizer solution, Take 2 mLs (0.5 mg total) by nebulization 2 (two) times daily., Disp: 360 mL, Rfl: 3    calcium carbonate (OS-KASIA) 600 mg (1,500 mg) Tab, Take 600 mg by mouth once daily., Disp: , Rfl:     cholecalciferol, vitamin D3, (VITAMIN D3) 400 unit Chew, Take by mouth. Take  tues - thurs - sat, Disp: , Rfl:     ciclopirox (PENLAC) 8 % Soln, Apply to affected toenails at night time DAILY. On 7th day, file nails down, clean all nails with alcohol and restart application process., Disp: 1 Bottle, Rfl: 10    dasatinib (SPRYCEL) 50 MG tablet, Take 1 tablet (50 mg total) by mouth every other day., Disp: , Rfl:     furosemide (LASIX) 20 MG tablet, Take 1 tablet (20 mg total) by mouth 2 (two) times daily. (Patient taking differently: Take 20 mg by mouth 2 (two) times daily. Pt takes two tablets once a day), Disp: 180 tablet, Rfl: 3    RANEXA 500 mg Tb12, TAKE 1 TABLET TWICE A DAY, Disp: 180 tablet, Rfl: 2    ranitidine (ZANTAC) 150 MG tablet, Take 1 tablet (150 mg total) by mouth 2 (two) times daily., Disp: 180 tablet, Rfl: 3    Review of patient's allergies indicates:   Allergen Reactions    Asmanex twisthaler  [mometasone]      Other reaction(s): Rhinitis  Other reaction(s): Headache  Other reaction(s): Eye irritation    Aspirin      Other reaction(s): Unknown    Brovana  [arformoterol] Nausea Only     Other reaction(s): Headache  Other reaction(s): Dry Nose    Butisol  [butabarbital]      Other reaction(s): Unknown    Codeine      Other reaction(s): Unknown    Diazepam      Other reaction(s): Unknown    Iodine      Other reaction(s): Unknown    Limbitrol  [amitriptyline-chlordiazepoxide]      Other reaction(s): Unknown  Other reaction(s): Unknown    Medrol  [methylprednisolone]       "Other reaction(s): Shortness of breath  Other reaction(s): Shortness of breath    Pcn [penicillins]     Sulfa (sulfonamide antibiotics)      Other reaction(s): Unknown        Review of Systems   Negative    Objective:     BP (!) 116/58 (BP Location: Left arm, Patient Position: Sitting, BP Method: Large (Manual))   Pulse 88   Temp 98 °F (36.7 °C) (Tympanic)   Resp 18   Ht 5' 2" (1.575 m)   Wt 61.1 kg (134 lb 11.2 oz)   SpO2 99%   BMI 24.64 kg/m²     Physical Exam  GEN: A&O fully, NAD  PSYC: Normal affect  GI: +epigastric TTP, S/ND, normal bowel sounds  EXT: No C/C/E      Lab Results   Component Value Date    WBC 5.41 02/21/2018    HGB 11.0 (L) 02/21/2018    HCT 34.8 (L) 02/21/2018     (L) 02/21/2018    CHOL 169 11/10/2015    TRIG 119 11/10/2015    HDL 55 11/10/2015    LDLCALC 90.2 11/10/2015    ALT 11 05/07/2018    AST 17 05/07/2018     05/09/2018    K 4.6 05/09/2018    CL 97 05/09/2018    CREATININE 0.9 05/09/2018    BUN 21 05/09/2018    CO2 36 (H) 05/09/2018    TSH 1.771 12/08/2016    INR 1.0 08/19/2017    GLUF 103 05/03/2006       Assessment:      1. Stomach pain: Moderate, intermittent. Likely 2/2 gastritis given association with spicy foods or high acid foods I.e. Sodas. Encouraged to avoid meat, except for un-fried seafood; avoid processed foods, sugar sweetened beverage, including 100% juices. Avoid apple sauce. Some benefit from ranitidine. Unable to tolerate Vistaril. Will re-refer to GI for further management.    2. Anxiety: She declines medications for this at this time. Will refer to psychology for talk tx.   3. HM: Filled out [facility] for [facility].       Plan:   Stomach pain  -     Ambulatory Referral to Gastroenterology    Anxiety  -     Ambulatory consult to Psychology    Insomnia, unspecified type        Follow-up in about 1 year (around 6/28/2019), or if symptoms worsen or fail to improve, for Annual.  "

## 2018-07-10 ENCOUNTER — HOSPITAL ENCOUNTER (OUTPATIENT)
Facility: HOSPITAL | Age: 83
Discharge: HOME HEALTH | End: 2018-07-11
Attending: EMERGENCY MEDICINE | Admitting: INTERNAL MEDICINE
Payer: MEDICARE

## 2018-07-10 DIAGNOSIS — R10.13 EPIGASTRIC PAIN: ICD-10-CM

## 2018-07-10 DIAGNOSIS — R79.89 ELEVATED TROPONIN: Primary | ICD-10-CM

## 2018-07-10 DIAGNOSIS — I50.9 ACUTE ON CHRONIC CONGESTIVE HEART FAILURE, UNSPECIFIED HEART FAILURE TYPE: ICD-10-CM

## 2018-07-10 DIAGNOSIS — R10.9 ABDOMINAL PAIN: ICD-10-CM

## 2018-07-10 LAB
ALBUMIN SERPL BCP-MCNC: 3.5 G/DL
ALP SERPL-CCNC: 66 U/L
ALT SERPL W/O P-5'-P-CCNC: 16 U/L
AMORPH CRY URNS QL MICRO: ABNORMAL
ANION GAP SERPL CALC-SCNC: 7 MMOL/L
ANISOCYTOSIS BLD QL SMEAR: SLIGHT
AST SERPL-CCNC: 19 U/L
BACTERIA #/AREA URNS HPF: ABNORMAL /HPF
BASOPHILS # BLD AUTO: 0.02 K/UL
BASOPHILS NFR BLD: 0.3 %
BILIRUB SERPL-MCNC: 0.5 MG/DL
BILIRUB UR QL STRIP: NEGATIVE
BNP SERPL-MCNC: 831 PG/ML
BUN SERPL-MCNC: 21 MG/DL
CALCIUM SERPL-MCNC: 9.4 MG/DL
CHLORIDE SERPL-SCNC: 96 MMOL/L
CLARITY UR: ABNORMAL
CO2 SERPL-SCNC: 33 MMOL/L
COLOR UR: YELLOW
CREAT SERPL-MCNC: 0.8 MG/DL
DACRYOCYTES BLD QL SMEAR: ABNORMAL
DIFFERENTIAL METHOD: ABNORMAL
EOSINOPHIL # BLD AUTO: 0.1 K/UL
EOSINOPHIL NFR BLD: 1.6 %
ERYTHROCYTE [DISTWIDTH] IN BLOOD BY AUTOMATED COUNT: 14.7 %
EST. GFR  (AFRICAN AMERICAN): >60 ML/MIN/1.73 M^2
EST. GFR  (NON AFRICAN AMERICAN): >60 ML/MIN/1.73 M^2
GIANT PLATELETS BLD QL SMEAR: PRESENT
GLUCOSE SERPL-MCNC: 116 MG/DL
GLUCOSE UR QL STRIP: NEGATIVE
HCT VFR BLD AUTO: 34.1 %
HGB BLD-MCNC: 11.1 G/DL
HGB UR QL STRIP: ABNORMAL
KETONES UR QL STRIP: NEGATIVE
LEUKOCYTE ESTERASE UR QL STRIP: ABNORMAL
LIPASE SERPL-CCNC: 6 U/L
LYMPHOCYTES # BLD AUTO: 1.2 K/UL
LYMPHOCYTES NFR BLD: 18.3 %
MAGNESIUM SERPL-MCNC: 1.9 MG/DL
MCH RBC QN AUTO: 32.1 PG
MCHC RBC AUTO-ENTMCNC: 32.6 G/DL
MCV RBC AUTO: 99 FL
MICROSCOPIC COMMENT: ABNORMAL
MONOCYTES # BLD AUTO: 0.6 K/UL
MONOCYTES NFR BLD: 8.9 %
NEUTROPHILS # BLD AUTO: 4.8 K/UL
NEUTROPHILS NFR BLD: 71 %
NITRITE UR QL STRIP: NEGATIVE
NON-SQ EPI CELLS #/AREA URNS HPF: 1 /HPF
OVALOCYTES BLD QL SMEAR: ABNORMAL
PH UR STRIP: 6 [PH] (ref 5–8)
PLATELET # BLD AUTO: 124 K/UL
PLATELET BLD QL SMEAR: ABNORMAL
PMV BLD AUTO: 12.5 FL
POIKILOCYTOSIS BLD QL SMEAR: SLIGHT
POTASSIUM SERPL-SCNC: 4.7 MMOL/L
PROT SERPL-MCNC: 6.7 G/DL
PROT UR QL STRIP: NEGATIVE
RBC # BLD AUTO: 3.46 M/UL
RBC #/AREA URNS HPF: 1 /HPF (ref 0–4)
SODIUM SERPL-SCNC: 136 MMOL/L
SP GR UR STRIP: 1.01 (ref 1–1.03)
SQUAMOUS #/AREA URNS HPF: 1 /HPF
STOMATOCYTES BLD QL SMEAR: PRESENT
TROPONIN I SERPL DL<=0.01 NG/ML-MCNC: 0.04 NG/ML
URN SPEC COLLECT METH UR: ABNORMAL
UROBILINOGEN UR STRIP-ACNC: NEGATIVE EU/DL
WBC # BLD AUTO: 6.71 K/UL
WBC #/AREA URNS HPF: 5 /HPF (ref 0–5)

## 2018-07-10 PROCEDURE — 25000003 PHARM REV CODE 250: Performed by: EMERGENCY MEDICINE

## 2018-07-10 PROCEDURE — 96374 THER/PROPH/DIAG INJ IV PUSH: CPT

## 2018-07-10 PROCEDURE — 99285 EMERGENCY DEPT VISIT HI MDM: CPT | Mod: ,,, | Performed by: INTERNAL MEDICINE

## 2018-07-10 PROCEDURE — P9612 CATHETERIZE FOR URINE SPEC: HCPCS

## 2018-07-10 PROCEDURE — 63600175 PHARM REV CODE 636 W HCPCS: Performed by: EMERGENCY MEDICINE

## 2018-07-10 PROCEDURE — 85025 COMPLETE CBC W/AUTO DIFF WBC: CPT

## 2018-07-10 PROCEDURE — 83735 ASSAY OF MAGNESIUM: CPT

## 2018-07-10 PROCEDURE — 25000003 PHARM REV CODE 250: Performed by: NURSE PRACTITIONER

## 2018-07-10 PROCEDURE — 81000 URINALYSIS NONAUTO W/SCOPE: CPT

## 2018-07-10 PROCEDURE — 96372 THER/PROPH/DIAG INJ SC/IM: CPT | Mod: 59

## 2018-07-10 PROCEDURE — 36415 COLL VENOUS BLD VENIPUNCTURE: CPT

## 2018-07-10 PROCEDURE — G0378 HOSPITAL OBSERVATION PER HR: HCPCS

## 2018-07-10 PROCEDURE — 99900038 HC OT GENERIC THERAPY SCREENING (STAT)

## 2018-07-10 PROCEDURE — 83690 ASSAY OF LIPASE: CPT

## 2018-07-10 PROCEDURE — 25000242 PHARM REV CODE 250 ALT 637 W/ HCPCS: Performed by: NURSE PRACTITIONER

## 2018-07-10 PROCEDURE — 99285 EMERGENCY DEPT VISIT HI MDM: CPT | Mod: 25

## 2018-07-10 PROCEDURE — 27000221 HC OXYGEN, UP TO 24 HOURS

## 2018-07-10 PROCEDURE — 83880 ASSAY OF NATRIURETIC PEPTIDE: CPT

## 2018-07-10 PROCEDURE — 80053 COMPREHEN METABOLIC PANEL: CPT

## 2018-07-10 PROCEDURE — 84484 ASSAY OF TROPONIN QUANT: CPT | Mod: 91

## 2018-07-10 PROCEDURE — 93010 ELECTROCARDIOGRAM REPORT: CPT | Mod: ,,, | Performed by: INTERNAL MEDICINE

## 2018-07-10 PROCEDURE — 63600175 PHARM REV CODE 636 W HCPCS: Performed by: NURSE PRACTITIONER

## 2018-07-10 PROCEDURE — 94640 AIRWAY INHALATION TREATMENT: CPT

## 2018-07-10 RX ORDER — FUROSEMIDE 10 MG/ML
40 INJECTION INTRAMUSCULAR; INTRAVENOUS ONCE
Status: COMPLETED | OUTPATIENT
Start: 2018-07-10 | End: 2018-07-10

## 2018-07-10 RX ORDER — FUROSEMIDE 10 MG/ML
40 INJECTION INTRAMUSCULAR; INTRAVENOUS 2 TIMES DAILY
Status: DISCONTINUED | OUTPATIENT
Start: 2018-07-10 | End: 2018-07-10

## 2018-07-10 RX ORDER — PANTOPRAZOLE SODIUM 40 MG/1
40 TABLET, DELAYED RELEASE ORAL
Status: DISPENSED | OUTPATIENT
Start: 2018-07-10 | End: 2018-07-10

## 2018-07-10 RX ORDER — ONDANSETRON 2 MG/ML
4 INJECTION INTRAMUSCULAR; INTRAVENOUS EVERY 8 HOURS PRN
Status: DISCONTINUED | OUTPATIENT
Start: 2018-07-10 | End: 2018-07-11 | Stop reason: HOSPADM

## 2018-07-10 RX ORDER — RAMELTEON 8 MG/1
8 TABLET ORAL NIGHTLY PRN
Status: DISCONTINUED | OUTPATIENT
Start: 2018-07-10 | End: 2018-07-11 | Stop reason: HOSPADM

## 2018-07-10 RX ORDER — IBUPROFEN 200 MG
24 TABLET ORAL
Status: DISCONTINUED | OUTPATIENT
Start: 2018-07-10 | End: 2018-07-11 | Stop reason: HOSPADM

## 2018-07-10 RX ORDER — AMOXICILLIN 250 MG
2 CAPSULE ORAL 2 TIMES DAILY PRN
Status: DISCONTINUED | OUTPATIENT
Start: 2018-07-10 | End: 2018-07-11 | Stop reason: HOSPADM

## 2018-07-10 RX ORDER — FUROSEMIDE 10 MG/ML
40 INJECTION INTRAMUSCULAR; INTRAVENOUS DAILY
Status: DISCONTINUED | OUTPATIENT
Start: 2018-07-11 | End: 2018-07-11 | Stop reason: HOSPADM

## 2018-07-10 RX ORDER — SODIUM CHLORIDE 0.9 % (FLUSH) 0.9 %
5 SYRINGE (ML) INJECTION
Status: DISCONTINUED | OUTPATIENT
Start: 2018-07-10 | End: 2018-07-11 | Stop reason: HOSPADM

## 2018-07-10 RX ORDER — ENOXAPARIN SODIUM 100 MG/ML
40 INJECTION SUBCUTANEOUS EVERY 24 HOURS
Status: DISCONTINUED | OUTPATIENT
Start: 2018-07-10 | End: 2018-07-11 | Stop reason: HOSPADM

## 2018-07-10 RX ORDER — POLYETHYLENE GLYCOL 3350 17 G/17G
17 POWDER, FOR SOLUTION ORAL DAILY
Status: DISCONTINUED | OUTPATIENT
Start: 2018-07-10 | End: 2018-07-11 | Stop reason: HOSPADM

## 2018-07-10 RX ORDER — RANOLAZINE 500 MG/1
500 TABLET, EXTENDED RELEASE ORAL 2 TIMES DAILY
Status: DISCONTINUED | OUTPATIENT
Start: 2018-07-10 | End: 2018-07-11 | Stop reason: HOSPADM

## 2018-07-10 RX ORDER — IPRATROPIUM BROMIDE AND ALBUTEROL SULFATE 2.5; .5 MG/3ML; MG/3ML
3 SOLUTION RESPIRATORY (INHALATION) EVERY 6 HOURS
Status: DISCONTINUED | OUTPATIENT
Start: 2018-07-10 | End: 2018-07-11 | Stop reason: HOSPADM

## 2018-07-10 RX ORDER — IBUPROFEN 200 MG
16 TABLET ORAL
Status: DISCONTINUED | OUTPATIENT
Start: 2018-07-10 | End: 2018-07-11 | Stop reason: HOSPADM

## 2018-07-10 RX ORDER — ACETAMINOPHEN 325 MG/1
650 TABLET ORAL EVERY 4 HOURS PRN
Status: DISCONTINUED | OUTPATIENT
Start: 2018-07-10 | End: 2018-07-11 | Stop reason: HOSPADM

## 2018-07-10 RX ORDER — GLUCAGON 1 MG
1 KIT INJECTION
Status: DISCONTINUED | OUTPATIENT
Start: 2018-07-10 | End: 2018-07-11 | Stop reason: HOSPADM

## 2018-07-10 RX ORDER — ACETAMINOPHEN 325 MG/1
650 TABLET ORAL EVERY 6 HOURS PRN
Status: DISCONTINUED | OUTPATIENT
Start: 2018-07-10 | End: 2018-07-11 | Stop reason: HOSPADM

## 2018-07-10 RX ADMIN — ENOXAPARIN SODIUM 40 MG: 100 INJECTION SUBCUTANEOUS at 05:07

## 2018-07-10 RX ADMIN — ACETAMINOPHEN 650 MG: 325 TABLET, FILM COATED ORAL at 07:07

## 2018-07-10 RX ADMIN — FUROSEMIDE 40 MG: 10 INJECTION, SOLUTION INTRAMUSCULAR; INTRAVENOUS at 12:07

## 2018-07-10 RX ADMIN — RANOLAZINE 500 MG: 500 TABLET, FILM COATED, EXTENDED RELEASE ORAL at 09:07

## 2018-07-10 RX ADMIN — IPRATROPIUM BROMIDE AND ALBUTEROL SULFATE 3 ML: .5; 3 SOLUTION RESPIRATORY (INHALATION) at 06:07

## 2018-07-10 RX ADMIN — IPRATROPIUM BROMIDE AND ALBUTEROL SULFATE 3 ML: .5; 3 SOLUTION RESPIRATORY (INHALATION) at 01:07

## 2018-07-10 RX ADMIN — DICYCLOMINE HYDROCHLORIDE 50 ML: 10 SOLUTION ORAL at 10:07

## 2018-07-10 RX ADMIN — POLYETHYLENE GLYCOL (3350) 17 G: 17 POWDER, FOR SOLUTION ORAL at 03:07

## 2018-07-10 NOTE — SUBJECTIVE & OBJECTIVE
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 Unruly Ball    CHF (congestive heart failure)     Clotting disorder     COPD (chronic obstructive pulmonary disease)     Coronary artery disease     CVA (cerebral infarction) 2013    Depression     Fall 2013    Heart murmur     Hyperlipidemia     Osteoporosis 2017    Neck of femur (T score -2.5)    Palpitation 2015    Sleep apnea     Traumatic brain injury 2013    Ulcer        Past Surgical History:   Procedure Laterality Date    APPENDECTOMY       SECTION      TUBAL LIGATION         Review of patient's allergies indicates:   Allergen Reactions    Asmanex twisthaler  [mometasone]      Other reaction(s): Rhinitis  Other reaction(s): Headache  Other reaction(s): Eye irritation    Aspirin      Other reaction(s): Unknown    Brovana  [arformoterol] Nausea Only     Other reaction(s): Headache  Other reaction(s): Dry Nose    Butisol  [butabarbital]      Other reaction(s): Unknown    Codeine      Other reaction(s): Unknown    Diazepam      Other reaction(s): Unknown    Iodine      Other reaction(s): Unknown    Limbitrol  [amitriptyline-chlordiazepoxide]      Other reaction(s): Unknown  Other reaction(s): Unknown    Medrol  [methylprednisolone]      Other reaction(s): Shortness of breath  Other reaction(s): Shortness of breath    Pcn [penicillins]     Sulfa (sulfonamide antibiotics)      Other reaction(s): Unknown       No current facility-administered medications on file prior to encounter.      Current Outpatient Prescriptions on File Prior to Encounter   Medication Sig    albuterol 90 mcg/actuation inhaler Inhale 2 puffs into the lungs every 6 (six) hours.    albuterol-ipratropium 2.5mg-0.5mg/3mL (DUO-NEB) 0.5 mg-3 mg(2.5 mg base)/3 mL  nebulizer solution USE 1 VIAL VIA NEBULIZER EVERY 6 HOURS WHILE AWAKE    cholecalciferol, vitamin D3, (VITAMIN D3) 400 unit Chew Take by mouth. Take  tues - thurs - sat    dasatinib (SPRYCEL) 50 MG tablet Take 1 tablet (50 mg total) by mouth every other day.    furosemide (LASIX) 20 MG tablet Take 1 tablet (20 mg total) by mouth 2 (two) times daily. (Patient taking differently: Take 20 mg by mouth 2 (two) times daily. Pt takes two tablets once a day)    RANEXA 500 mg Tb12 TAKE 1 TABLET TWICE A DAY    ranitidine (ZANTAC) 150 MG tablet Take 1 tablet (150 mg total) by mouth 2 (two) times daily.    budesonide (PULMICORT) 0.5 mg/2 mL nebulizer solution Take 2 mLs (0.5 mg total) by nebulization 2 (two) times daily.    ciclopirox (PENLAC) 8 % Soln Apply to affected toenails at night time DAILY. On 7th day, file nails down, clean all nails with alcohol and restart application process.    [DISCONTINUED] calcium carbonate (OS-KASIA) 600 mg (1,500 mg) Tab Take 600 mg by mouth once daily.     Family History     Problem Relation (Age of Onset)    COPD Mother, Sister, Brother    Cancer Sister    Heart disease Father        Social History Main Topics    Smoking status: Never Smoker    Smokeless tobacco: Never Used    Alcohol use No    Drug use: No    Sexual activity: No     Review of Systems   Constitutional: Positive for fatigue. Negative for appetite change, chills and fever.   HENT: Negative.  Negative for congestion, ear discharge, facial swelling, sore throat and trouble swallowing.    Eyes: Negative.  Negative for photophobia, pain, discharge, redness and visual disturbance.   Respiratory: Positive for shortness of breath. Negative for cough, chest tightness, wheezing and stridor.    Cardiovascular: Negative.  Negative for chest pain, palpitations and leg swelling.   Gastrointestinal: Positive for abdominal pain (epigastric pain). Negative for abdominal distention, anal bleeding, blood in stool, constipation,  diarrhea, nausea, rectal pain and vomiting.   Endocrine: Negative.  Negative for cold intolerance, heat intolerance, polydipsia, polyphagia and polyuria.   Genitourinary: Negative.  Negative for difficulty urinating, dysuria, flank pain, frequency, hematuria, pelvic pain, urgency, vaginal bleeding and vaginal discharge.        Incontinent   Musculoskeletal: Positive for gait problem (uses walker). Negative for arthralgias, back pain, joint swelling, myalgias and neck pain.   Skin: Negative for color change, pallor, rash and wound.   Allergic/Immunologic: Negative.  Negative for food allergies and immunocompromised state.   Neurological: Positive for weakness. Negative for dizziness, tremors, seizures, syncope, facial asymmetry, speech difficulty, light-headedness, numbness and headaches.   Hematological: Negative.  Negative for adenopathy. Does not bruise/bleed easily.   Psychiatric/Behavioral: Positive for dysphoric mood. Negative for agitation, confusion, hallucinations, sleep disturbance and suicidal ideas. The patient is not nervous/anxious.    All other systems reviewed and are negative.    Objective:     Vital Signs (Most Recent):  Temp: 98 °F (36.7 °C) (07/10/18 1155)  Pulse: 71 (07/10/18 1248)  Resp: 18 (07/10/18 1248)  BP: 132/64 (07/10/18 1005)  SpO2: (!) 86 % (07/10/18 1248) Vital Signs (24h Range):  Temp:  [98 °F (36.7 °C)-98.1 °F (36.7 °C)] 98 °F (36.7 °C)  Pulse:  [68-75] 71  Resp:  [13-20] 18  SpO2:  [86 %-100 %] 86 %  BP: (132-141)/(60-64) 132/64     Weight: 62.6 kg (138 lb)  Body mass index is 26.07 kg/m².    Physical Exam   Constitutional: She is oriented to person, place, and time. She appears well-developed and well-nourished. No distress.   HENT:   Head: Normocephalic and atraumatic.   Nose: Nose normal.   Eyes: Conjunctivae and EOM are normal. Pupils are equal, round, and reactive to light. Right eye exhibits no discharge. Left eye exhibits no discharge.   Neck: Normal range of motion. Neck  supple. JVD present. No tracheal deviation present. No thyromegaly present.   Cardiovascular: Normal rate and regular rhythm.  Exam reveals no gallop and no friction rub.    Murmur (3/6 EDUARDO loudest at RSB) heard.  Pulmonary/Chest: No stridor. No respiratory distress. She has no wheezes. She has no rales. She exhibits no tenderness.   Rales   Abdominal: Soft. Bowel sounds are normal. She exhibits no distension and no mass. There is no tenderness. There is no guarding.   Musculoskeletal: Normal range of motion. She exhibits edema (2+ lower extremity edema). She exhibits no tenderness or deformity.   Lymphadenopathy:     She has no cervical adenopathy.   Neurological: She is alert and oriented to person, place, and time. She has normal reflexes. No cranial nerve deficit. Coordination normal.   Skin: Skin is warm and dry. No rash noted. She is not diaphoretic. No erythema. No pallor.   Psychiatric: Her behavior is normal. Judgment and thought content normal.   Oriented to person, place, time, circumstance.   Nursing note and vitals reviewed.        CRANIAL NERVES     CN III, IV, VI   Pupils are equal, round, and reactive to light.  Extraocular motions are normal.      Significant Labs:   CBC:   Recent Labs  Lab 07/10/18  1019   WBC 6.71   HGB 11.1*   HCT 34.1*   *     CMP:   Recent Labs  Lab 07/10/18  1019      K 4.7   CL 96   CO2 33*   *   BUN 21   CREATININE 0.8   CALCIUM 9.4   PROT 6.7   ALBUMIN 3.5   BILITOT 0.5   ALKPHOS 66   AST 19   ALT 16   ANIONGAP 7*   EGFRNONAA >60     Cardiac Markers:   Recent Labs  Lab 07/10/18  1019   *     Magnesium: pending  Significant Imaging: I have reviewed all pertinent imaging results/findings within the past 24 hours.   Imaging Results          X-Ray Chest 1 View (Final result)  Result time 07/10/18 10:41:59    Final result by Markus Shaikh Jr., MD (07/10/18 10:41:59)                 Impression:      No acute findings.      Electronically signed  by: Markus Soliz MD  Date:    07/10/2018  Time:    10:41             Narrative:    EXAMINATION:  XR CHEST 1 VIEW    CLINICAL HISTORY:  Unspecified abdominal pain    COMPARISON:  05/07/2018    FINDINGS:  Heart size is normal.  Lungs appear clear of active disease.  No infiltrates or effusions.  No suspicious mass. Chronic changes in the spine and shoulders.                               X-Ray Abdomen Flat And Erect (Final result)  Result time 07/10/18 10:41:22    Final result by Markus Soliz Jr., MD (07/10/18 10:41:22)                 Impression:      No acute findings.      Electronically signed by: Markus Soliz MD  Date:    07/10/2018  Time:    10:41             Narrative:    EXAMINATION:  XR ABDOMEN FLAT AND ERECT    CLINICAL HISTORY:  Epigastric pain    COMPARISON:  No comparison studies available.    FINDINGS:  No free air.  No dilated large or small bowel loops.  Nothing to indicate intestinal obstruction.  No obvious soft tissue mass or unusual calcification.  Multilevel chronic changes in the thoracic and lumbar spine.  Suggestion of an old fracture involving the upper lumbar region.

## 2018-07-10 NOTE — SUBJECTIVE & OBJECTIVE
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 Unruly Ball    CHF (congestive heart failure)     Clotting disorder     COPD (chronic obstructive pulmonary disease)     Coronary artery disease     CVA (cerebral infarction) 2013    Depression     Fall 2013    Heart murmur     Hyperlipidemia     Osteoporosis 2017    Neck of femur (T score -2.5)    Palpitation 2015    Sleep apnea     Traumatic brain injury 2013    Ulcer        Past Surgical History:   Procedure Laterality Date    APPENDECTOMY       SECTION      TUBAL LIGATION         Review of patient's allergies indicates:   Allergen Reactions    Asmanex twisthaler  [mometasone]      Other reaction(s): Rhinitis  Other reaction(s): Headache  Other reaction(s): Eye irritation    Aspirin      Other reaction(s): Unknown    Brovana  [arformoterol] Nausea Only     Other reaction(s): Headache  Other reaction(s): Dry Nose    Butisol  [butabarbital]      Other reaction(s): Unknown    Codeine      Other reaction(s): Unknown    Diazepam      Other reaction(s): Unknown    Iodine      Other reaction(s): Unknown    Limbitrol  [amitriptyline-chlordiazepoxide]      Other reaction(s): Unknown  Other reaction(s): Unknown    Medrol  [methylprednisolone]      Other reaction(s): Shortness of breath  Other reaction(s): Shortness of breath    Pcn [penicillins]     Sulfa (sulfonamide antibiotics)      Other reaction(s): Unknown       No current facility-administered medications on file prior to encounter.      Current Outpatient Prescriptions on File Prior to Encounter   Medication Sig    albuterol 90 mcg/actuation inhaler Inhale 2 puffs into the lungs every 6 (six) hours.    albuterol-ipratropium 2.5mg-0.5mg/3mL (DUO-NEB) 0.5 mg-3 mg(2.5 mg base)/3 mL  nebulizer solution USE 1 VIAL VIA NEBULIZER EVERY 6 HOURS WHILE AWAKE    cholecalciferol, vitamin D3, (VITAMIN D3) 400 unit Chew Take by mouth. Take  tues - thurs - sat    dasatinib (SPRYCEL) 50 MG tablet Take 1 tablet (50 mg total) by mouth every other day.    furosemide (LASIX) 20 MG tablet Take 1 tablet (20 mg total) by mouth 2 (two) times daily. (Patient taking differently: Take 20 mg by mouth 2 (two) times daily. Pt takes two tablets once a day)    RANEXA 500 mg Tb12 TAKE 1 TABLET TWICE A DAY    ranitidine (ZANTAC) 150 MG tablet Take 1 tablet (150 mg total) by mouth 2 (two) times daily.    budesonide (PULMICORT) 0.5 mg/2 mL nebulizer solution Take 2 mLs (0.5 mg total) by nebulization 2 (two) times daily.    calcium carbonate (OS-KASIA) 600 mg (1,500 mg) Tab Take 600 mg by mouth once daily.    ciclopirox (PENLAC) 8 % Soln Apply to affected toenails at night time DAILY. On 7th day, file nails down, clean all nails with alcohol and restart application process.     Family History     Problem Relation (Age of Onset)    COPD Mother, Sister, Brother    Cancer Sister    Heart disease Father        Social History Main Topics    Smoking status: Never Smoker    Smokeless tobacco: Never Used    Alcohol use No    Drug use: No    Sexual activity: No     Review of Systems   Constitution: Positive for weakness and malaise/fatigue.   Cardiovascular: Positive for leg swelling.   Respiratory: Positive for shortness of breath.    Musculoskeletal: Positive for arthritis and joint pain.   Gastrointestinal: Positive for abdominal pain.     Objective:     Vital Signs (Most Recent):  Temp: 98 °F (36.7 °C) (07/10/18 1155)  Pulse: 71 (07/10/18 1248)  Resp: 18 (07/10/18 1248)  BP: 132/64 (07/10/18 1005)  SpO2: (!) 86 % (07/10/18 1248) Vital Signs (24h Range):  Temp:  [98 °F (36.7 °C)-98.1 °F (36.7 °C)] 98 °F (36.7 °C)  Pulse:  [68-75] 71  Resp:  [13-20] 18  SpO2:  [86 %-100 %] 86 %  BP: (132-141)/(60-64) 132/64     Weight: 62.6  kg (138 lb)  Body mass index is 26.07 kg/m².    SpO2: (!) 86 %  O2 Device (Oxygen Therapy): room air    No intake or output data in the 24 hours ending 07/10/18 1323    Lines/Drains/Airways     Peripheral Intravenous Line                 Peripheral IV - Single Lumen 07/10/18 1019 Left Antecubital less than 1 day                Physical Exam   Constitutional: She is oriented to person, place, and time. She appears well-developed and well-nourished.   Neck: Neck supple. No JVD present.   Cardiovascular: Normal rate, regular rhythm and normal pulses.  Exam reveals no friction rub.    Murmur heard.   Harsh midsystolic murmur is present  at the upper right sternal border radiating to the neck  Pulmonary/Chest: Effort normal. No respiratory distress. She has no wheezes. She has rales.   Abdominal: Soft. Bowel sounds are normal. She exhibits no distension.   Musculoskeletal: She exhibits edema ( +2-3 BLE edema ). She exhibits no tenderness.   Neurological: She is alert and oriented to person, place, and time.   Skin: Skin is warm and dry. No rash noted.   Psychiatric: She has a normal mood and affect. Her behavior is normal.   Nursing note and vitals reviewed.      Significant Labs:   All pertinent lab results from the last 24 hours have been reviewed. and   Recent Lab Results       07/10/18  1138 07/10/18  1019      Albumin  3.5     Alkaline Phosphatase  66     ALT  16     Amorphous, UA Occasional      Anion Gap  7(L)     Aniso  Slight     Appearance, UA Hazy(A)      AST  19     Bacteria, UA Few(A)      Baso #  0.02     Basophil%  0.3     Bilirubin (UA) Negative      Total Bilirubin  0.5  Comment:  For infants and newborns, interpretation of results should be based  on gestational age, weight and in agreement with clinical  observations.  Premature Infant recommended reference ranges:  Up to 24 hours.............<8.0 mg/dL  Up to 48 hours............<12.0 mg/dL  3-5 days..................<15.0 mg/dL  6-29  days.................<15.0 mg/dL       BNP  831  Comment:  Values of less than 100 pg/ml are consistent with non-CHF populations.(H)     BUN, Bld  21     Calcium  9.4     Chloride  96     CO2  33(H)     Color, UA Yellow      Creatinine  0.8     Differential Method  Automated     eGFR if   >60     eGFR if non   >60  Comment:  Calculation used to obtain the estimated glomerular filtration  rate (eGFR) is the CKD-EPI equation.        Eos #  0.1     Eosinophil%  1.6     Large/Giant Platelets  Present     Glucose  116(H)     Glucose, UA Negative      Gran # (ANC)  4.8     Gran%  71.0     Hematocrit  34.1(L)     Hemoglobin  11.1(L)     Ketones, UA Negative      Leukocytes, UA 1+(A)      Lipase  6     Lymph #  1.2     Lymph%  18.3     MCH  32.1(H)     MCHC  32.6     MCV  99(H)     Microscopic Comment SEE COMMENT  Comment:  Other formed elements not mentioned in the report are not   present in the microscopic examination.         Mono #  0.6     Mono%  8.9     MPV  12.5     Nitrite, UA Negative      Non-Squam Epith 1(A)      Occult Blood UA 1+(A)      Ovalocytes  Occasional     pH, UA 6.0      Platelet Estimate  Decreased(A)     Platelets  124(L)     Poik  Slight     Potassium  4.7     Total Protein  6.7     Protein, UA Negative  Comment:  Recommend a 24 hour urine protein or a urine   protein/creatinine ratio if globulin induced proteinuria is  clinically suspected.        RBC  3.46(L)     RBC, UA 1      RDW  14.7(H)     Sodium  136     Specific Gravity, UA 1.010      Specimen UA Urine, Clean Catch      Squam Epithel, UA 1      Stomatocytes  Present     Tear Drop Cells  Occasional     Troponin I  0.038  Comment:  The reference interval for Troponin I represents the 99th percentile   cutoff   for our facility and is consistent with 3rd generation assay   performance.  (H)     Urobilinogen, UA Negative      WBC, UA 5      WBC  6.71           Significant Imaging: Echocardiogram:   2D echo with  color flow doppler:   Results for orders placed or performed in visit on 05/09/18   2D echo with color flow doppler   Result Value Ref Range    EF 55 55 - 65    Mitral Valve Regurgitation TRIVIAL     Diastolic Dysfunction Yes (A)     Aortic Valve Stenosis SEVERE (A)     Est. PA Systolic Pressure 42.25 (A)     Tricuspid Valve Regurgitation TRIVIAL     and X-Ray: CXR: X-Ray Chest 1 View (CXR):   Results for orders placed or performed during the hospital encounter of 07/10/18   X-Ray Chest 1 View    Narrative    EXAMINATION:  XR CHEST 1 VIEW    CLINICAL HISTORY:  Unspecified abdominal pain    COMPARISON:  05/07/2018    FINDINGS:  Heart size is normal.  Lungs appear clear of active disease.  No infiltrates or effusions.  No suspicious mass. Chronic changes in the spine and shoulders.      Impression    No acute findings.      Electronically signed by: Faby Soliz MD  Date:    07/10/2018  Time:    10:41    and X-Ray Chest PA and Lateral (CXR): No results found for this visit on 07/10/18.

## 2018-07-10 NOTE — PLAN OF CARE
07/10/18 1224   PUGA Message   Medicare Outpatient and Observation Notification regarding financial responsibility Given to patient/caregiver;Explained to patient/caregiver;Signed/date by patient/caregiver   Date PUGA was signed 07/10/18   Time PUGA was signed 1204

## 2018-07-10 NOTE — CONSULTS
[facility] -   Cardiology  Consult Note    Patient Name: Carmela Maria  MRN: [id number]  Admission Date: 7/10/2018  Hospital Length of Stay: 0 days  Code Status: DNR   Attending Provider: Jovanny Mills,*   Consulting Provider: ARIELA Boyd  Primary Care Physician: Aden Jones MD  Principal Problem:<principal problem not specified>    Patient information was obtained from family  and ER records.     Inpatient consult to Cardiology  Consult performed by: OJSE MORATAYA  Consult ordered by: RACHNA BERRIOS  Reason for consult: CHF and severe AS        Subjective:     Chief Complaint:  abd pain and SOB     HPI:   Mrs. Maria is a 89 y/o female with known Severe AS - mean gradient 93 mmHg, diastolic HF, COPD, CML. Patient presented to the ED with complaints of abdominal pain and SOB. Given GI cocktail with relief of abdominal pain.  Patient followed in cardiology clinic by Dr. Jeffers. Seen by Dr. Arango in May 2018 and was instructed to increase Lasix to 40mg  BID. Patient took extra Lasix for a few days, but resumed once daily dosing. Denies any chest pain. Has lower extremity edema, orthopnea and PND. Has responded well to IV lasix . , mild trop leak 0.038. CXR shows no acute findings.     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 Unruly Ball    CHF (congestive heart failure)     Clotting disorder     COPD (chronic obstructive pulmonary disease)     Coronary artery disease     CVA (cerebral infarction) 11/13/2013    Depression     Fall 11/13/2013    Heart murmur     Hyperlipidemia     Osteoporosis 04/24/2017    Neck of femur (T score -2.5)    Palpitation 11/18/2015    Sleep apnea     Traumatic brain injury 11/13/2013    Ulcer        Past Surgical History:   Procedure Laterality  Date    APPENDECTOMY       SECTION      TUBAL LIGATION         Review of patient's allergies indicates:   Allergen Reactions    Asmanex twisthaler  [mometasone]      Other reaction(s): Rhinitis  Other reaction(s): Headache  Other reaction(s): Eye irritation    Aspirin      Other reaction(s): Unknown    Brovana  [arformoterol] Nausea Only     Other reaction(s): Headache  Other reaction(s): Dry Nose    Butisol  [butabarbital]      Other reaction(s): Unknown    Codeine      Other reaction(s): Unknown    Diazepam      Other reaction(s): Unknown    Iodine      Other reaction(s): Unknown    Limbitrol  [amitriptyline-chlordiazepoxide]      Other reaction(s): Unknown  Other reaction(s): Unknown    Medrol  [methylprednisolone]      Other reaction(s): Shortness of breath  Other reaction(s): Shortness of breath    Pcn [penicillins]     Sulfa (sulfonamide antibiotics)      Other reaction(s): Unknown       No current facility-administered medications on file prior to encounter.      Current Outpatient Prescriptions on File Prior to Encounter   Medication Sig    albuterol 90 mcg/actuation inhaler Inhale 2 puffs into the lungs every 6 (six) hours.    albuterol-ipratropium 2.5mg-0.5mg/3mL (DUO-NEB) 0.5 mg-3 mg(2.5 mg base)/3 mL nebulizer solution USE 1 VIAL VIA NEBULIZER EVERY 6 HOURS WHILE AWAKE    cholecalciferol, vitamin D3, (VITAMIN D3) 400 unit Chew Take by mouth. Take  tues - thurs - sat    dasatinib (SPRYCEL) 50 MG tablet Take 1 tablet (50 mg total) by mouth every other day.    furosemide (LASIX) 20 MG tablet Take 1 tablet (20 mg total) by mouth 2 (two) times daily. (Patient taking differently: Take 20 mg by mouth 2 (two) times daily. Pt takes two tablets once a day)    RANEXA 500 mg Tb12 TAKE 1 TABLET TWICE A DAY    ranitidine (ZANTAC) 150 MG tablet Take 1 tablet (150 mg total) by mouth 2 (two) times daily.    budesonide (PULMICORT) 0.5 mg/2 mL nebulizer solution Take 2 mLs (0.5 mg total) by  nebulization 2 (two) times daily.    calcium carbonate (OS-KASIA) 600 mg (1,500 mg) Tab Take 600 mg by mouth once daily.    ciclopirox (PENLAC) 8 % Soln Apply to affected toenails at night time DAILY. On 7th day, file nails down, clean all nails with alcohol and restart application process.     Family History     Problem Relation (Age of Onset)    COPD Mother, Sister, Brother    Cancer Sister    Heart disease Father        Social History Main Topics    Smoking status: Never Smoker    Smokeless tobacco: Never Used    Alcohol use No    Drug use: No    Sexual activity: No     Review of Systems   Constitution: Positive for weakness and malaise/fatigue.   Cardiovascular: Positive for leg swelling.   Respiratory: Positive for shortness of breath.    Musculoskeletal: Positive for arthritis and joint pain.   Gastrointestinal: Positive for abdominal pain.     Objective:     Vital Signs (Most Recent):  Temp: 98 °F (36.7 °C) (07/10/18 1155)  Pulse: 71 (07/10/18 1248)  Resp: 18 (07/10/18 1248)  BP: 132/64 (07/10/18 1005)  SpO2: (!) 86 % (07/10/18 1248) Vital Signs (24h Range):  Temp:  [98 °F (36.7 °C)-98.1 °F (36.7 °C)] 98 °F (36.7 °C)  Pulse:  [68-75] 71  Resp:  [13-20] 18  SpO2:  [86 %-100 %] 86 %  BP: (132-141)/(60-64) 132/64     Weight: 62.6 kg (138 lb)  Body mass index is 26.07 kg/m².    SpO2: (!) 86 %  O2 Device (Oxygen Therapy): room air    No intake or output data in the 24 hours ending 07/10/18 1323    Lines/Drains/Airways     Peripheral Intravenous Line                 Peripheral IV - Single Lumen 07/10/18 1019 Left Antecubital less than 1 day                Physical Exam   Constitutional: She is oriented to person, place, and time. She appears well-developed and well-nourished.   Neck: Neck supple. No JVD present.   Cardiovascular: Normal rate, regular rhythm and normal pulses.  Exam reveals no friction rub.    Murmur heard.   Harsh midsystolic murmur is present  at the upper right sternal border radiating to  the neck  Pulmonary/Chest: Effort normal. No respiratory distress. She has no wheezes. She has rales.   Abdominal: Soft. Bowel sounds are normal. She exhibits no distension.   Musculoskeletal: She exhibits edema ( +2-3 BLE edema ). She exhibits no tenderness.   Neurological: She is alert and oriented to person, place, and time.   Skin: Skin is warm and dry. No rash noted.   Psychiatric: She has a normal mood and affect. Her behavior is normal.   Nursing note and vitals reviewed.      Significant Labs:   All pertinent lab results from the last 24 hours have been reviewed. and   Recent Lab Results       07/10/18  1138 07/10/18  1019      Albumin  3.5     Alkaline Phosphatase  66     ALT  16     Amorphous, UA Occasional      Anion Gap  7(L)     Aniso  Slight     Appearance, UA Hazy(A)      AST  19     Bacteria, UA Few(A)      Baso #  0.02     Basophil%  0.3     Bilirubin (UA) Negative      Total Bilirubin  0.5  Comment:  For infants and newborns, interpretation of results should be based  on gestational age, weight and in agreement with clinical  observations.  Premature Infant recommended reference ranges:  Up to 24 hours.............<8.0 mg/dL  Up to 48 hours............<12.0 mg/dL  3-5 days..................<15.0 mg/dL  6-29 days.................<15.0 mg/dL       BNP  831  Comment:  Values of less than 100 pg/ml are consistent with non-CHF populations.(H)     BUN, Bld  21     Calcium  9.4     Chloride  96     CO2  33(H)     Color, UA Yellow      Creatinine  0.8     Differential Method  Automated     eGFR if   >60     eGFR if non   >60  Comment:  Calculation used to obtain the estimated glomerular filtration  rate (eGFR) is the CKD-EPI equation.        Eos #  0.1     Eosinophil%  1.6     Large/Giant Platelets  Present     Glucose  116(H)     Glucose, UA Negative      Gran # (ANC)  4.8     Gran%  71.0     Hematocrit  34.1(L)     Hemoglobin  11.1(L)     Ketones, UA Negative       Leukocytes, UA 1+(A)      Lipase  6     Lymph #  1.2     Lymph%  18.3     MCH  32.1(H)     MCHC  32.6     MCV  99(H)     Microscopic Comment SEE COMMENT  Comment:  Other formed elements not mentioned in the report are not   present in the microscopic examination.         Mono #  0.6     Mono%  8.9     MPV  12.5     Nitrite, UA Negative      Non-Squam Epith 1(A)      Occult Blood UA 1+(A)      Ovalocytes  Occasional     pH, UA 6.0      Platelet Estimate  Decreased(A)     Platelets  124(L)     Poik  Slight     Potassium  4.7     Total Protein  6.7     Protein, UA Negative  Comment:  Recommend a 24 hour urine protein or a urine   protein/creatinine ratio if globulin induced proteinuria is  clinically suspected.        RBC  3.46(L)     RBC, UA 1      RDW  14.7(H)     Sodium  136     Specific Gravity, UA 1.010      Specimen UA Urine, Clean Catch      Squam Epithel, UA 1      Stomatocytes  Present     Tear Drop Cells  Occasional     Troponin I  0.038  Comment:  The reference interval for Troponin I represents the 99th percentile   cutoff   for our facility and is consistent with 3rd generation assay   performance.  (H)     Urobilinogen, UA Negative      WBC, UA 5      WBC  6.71           Significant Imaging: Echocardiogram:   2D echo with color flow doppler:   Results for orders placed or performed in visit on 05/09/18   2D echo with color flow doppler   Result Value Ref Range    EF 55 55 - 65    Mitral Valve Regurgitation TRIVIAL     Diastolic Dysfunction Yes (A)     Aortic Valve Stenosis SEVERE (A)     Est. PA Systolic Pressure 42.25 (A)     Tricuspid Valve Regurgitation TRIVIAL     and X-Ray: CXR: X-Ray Chest 1 View (CXR):   Results for orders placed or performed during the hospital encounter of 07/10/18   X-Ray Chest 1 View    Narrative    EXAMINATION:  XR CHEST 1 VIEW    CLINICAL HISTORY:  Unspecified abdominal pain    COMPARISON:  05/07/2018    FINDINGS:  Heart size is normal.  Lungs appear clear of active disease.   No infiltrates or effusions.  No suspicious mass. Chronic changes in the spine and shoulders.      Impression    No acute findings.      Electronically signed by: Faby Soliz MD  Date:    07/10/2018  Time:    10:41    and X-Ray Chest PA and Lateral (CXR): No results found for this visit on 07/10/18.    Assessment and Plan:     Acute on chronic diastolic heart failure    Patient presents to the ED with decompensated HF.  Patient also with  Known severe AS. -Was taking Lasix 40mg BID, but decreased dose of 40mng daily a few days ago. Has variable diet compliance  -  -has crackles and edema on exam  -Needs to diurese  -Agree with IV Lasix 40mg daily   -Monitor BMP   -Replace lytes as needed   -Mild troponin leak likely secondary to demand ischemia given her decompensated HF and severe AS  -No BB or ACEI/ARB due to history of side effects and hypotension. May re-attempt as an OP         Severe aortic stenosis    See plan for CHF            VTE Risk Mitigation     None        Chart reviewed. Patient examined by Dr. Arango and agrees with plan that has been outlined.     Thank you for your consult. I will follow-up with patient. Please contact us if you have any additional questions.    ARIELA Boyd  Cardiology   [facility] [place]

## 2018-07-10 NOTE — ED NOTES
Patient sleeping comfortably in bed with family member at bedside, patient in no apparent distress, will continue to monitor her.

## 2018-07-10 NOTE — ED PROVIDER NOTES
"SCRIBE #1 NOTE: I, Corinne Mack, am scribing for, and in the presence of, Jovanny Mills MD. I have scribed the entire note.      History      Chief Complaint   Patient presents with    Abdominal Pain     abdominal pain for several weeks and SOB . Denies any chest pain       Review of patient's allergies indicates:   Allergen Reactions    Asmanex twisthaler  [mometasone]      Other reaction(s): Rhinitis  Other reaction(s): Headache  Other reaction(s): Eye irritation    Aspirin      Other reaction(s): Unknown    Brovana  [arformoterol] Nausea Only     Other reaction(s): Headache  Other reaction(s): Dry Nose    Butisol  [butabarbital]      Other reaction(s): Unknown    Codeine      Other reaction(s): Unknown    Diazepam      Other reaction(s): Unknown    Iodine      Other reaction(s): Unknown    Limbitrol  [amitriptyline-chlordiazepoxide]      Other reaction(s): Unknown  Other reaction(s): Unknown    Medrol  [methylprednisolone]      Other reaction(s): Shortness of breath  Other reaction(s): Shortness of breath    Pcn [penicillins]     Sulfa (sulfonamide antibiotics)      Other reaction(s): Unknown        HPI   HPI    7/10/2018, 10:05 AM   History obtained from the patient      History of Present Illness: Carmela Maria is a 89 y.o. female patient with PMHx of CHF, COPD, CVA, CAD, and acid reflux who presents to the Emergency Department for achy abd pain which onset gradually 2 months ago. Pt has been to see her PCP for this issue, but has not been put on any medications for it. Symptoms are constant and moderate in severity. Drinking cool liquids improves the pt's pain. No exacerbating factors reported. No associated sxs reported. Per the family member, pt has been getting "short winded" more often because it "hurts the pt to breath" secondary to the pt's abd pain. Patient denies any fever, chills, CP, SOB, N/V/D, back pain, HA, and all other sxs at this time. No prior Tx reported. No further " complaints or concerns at this time.         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 Unruly Ball    CHF (congestive heart failure)     Clotting disorder     COPD (chronic obstructive pulmonary disease)     Coronary artery disease     CVA (cerebral infarction) 2013    Depression     Fall 2013    Heart murmur     Hyperlipidemia     Osteoporosis 2017    Neck of femur (T score -2.5)    Palpitation 2015    Sleep apnea     Traumatic brain injury 2013    Ulcer        Past Surgical History:  Past Surgical History:   Procedure Laterality Date    APPENDECTOMY       SECTION      TUBAL LIGATION           Family History:  Family History   Problem Relation Age of Onset    COPD Mother     Heart disease Father     Cancer Sister     COPD Sister     COPD Brother        Social History:  Social History     Social History Main Topics    Smoking status: Never Smoker    Smokeless tobacco: Never Used    Alcohol use No    Drug use: No    Sexual activity: No       ROS   Review of Systems   Constitutional: Negative for chills and fever.   Respiratory: Positive for shortness of breath (secondary to abd pain). Negative for cough.    Cardiovascular: Negative for chest pain and leg swelling.   Gastrointestinal: Positive for abdominal pain. Negative for diarrhea, nausea and vomiting.   Musculoskeletal: Negative for back pain, neck pain and neck stiffness.   Skin: Negative for rash and wound.   Neurological: Negative for dizziness, light-headedness, numbness and headaches.   All other systems reviewed and are negative.    Physical Exam      Initial Vitals [07/10/18 0954]   BP Pulse Resp Temp SpO2   (!) 141/60 73 20 98.1  "°F (36.7 °C) (!) 92 %      MAP       --          Physical Exam  Nursing Notes and Vital Signs Reviewed.  Constitutional: Patient is in no apparent distress. Well-developed and well-nourished. Elderly.  Head: Atraumatic. Normocephalic.  Eyes: PERRL. EOM intact. Conjunctivae are not pale. No scleral icterus.  ENT: Mucous membranes are moist. Oropharynx is clear and symmetric.    Neck: Supple. Full ROM. No lymphadenopathy.  Cardiovascular: Regular rate. Regular rhythm. Systolic murmur. No rubs or gallops. Distal pulses are 2+ and symmetric.  Pulmonary/Chest: No respiratory distress. Clear to auscultation bilaterally. No wheezing or rales.  Abdominal: Soft and non-distended.  There is no tenderness.  No rebound, guarding, or rigidity.  Musculoskeletal: Moves all extremities. No obvious deformities.  Skin: Warm and dry.  Neurological:  Alert, awake, and appropriate.  Normal speech.  No acute focal neurological deficits are appreciated.  Psychiatric: Normal affect. Good eye contact. Appropriate in content.    ED Course    Procedures  ED Vital Signs:  Vitals:    07/10/18 0954 07/10/18 1005 07/10/18 1010 07/10/18 1011   BP: (!) 141/60 132/64     Pulse: 73 75 71 72   Resp: 20  13    Temp: 98.1 °F (36.7 °C)      TempSrc: Oral      SpO2: (!) 92% (!) 93% 96%    Weight: 62.6 kg (138 lb)      Height: 5' 1" (1.549 m)       07/10/18 1020 07/10/18 1035 07/10/18 1155 07/10/18 1248   BP:       Pulse: 72 68  71   Resp: 18 18  18   Temp:   98 °F (36.7 °C)    TempSrc:   Oral    SpO2: 100% 95%  (!) 86%   Weight:       Height:        07/10/18 1356 07/10/18 1418   BP:     Pulse: 71    Resp: (!) 24    Temp:  98.1 °F (36.7 °C)   TempSrc:  Oral   SpO2: 98%    Weight:     Height:         Abnormal Lab Results:  Labs Reviewed   CBC W/ AUTO DIFFERENTIAL - Abnormal; Notable for the following:        Result Value    RBC 3.46 (*)     Hemoglobin 11.1 (*)     Hematocrit 34.1 (*)     MCV 99 (*)     MCH 32.1 (*)     RDW 14.7 (*)     Platelets 124 (*)     " Platelet Estimate Decreased (*)     All other components within normal limits   COMPREHENSIVE METABOLIC PANEL - Abnormal; Notable for the following:     CO2 33 (*)     Glucose 116 (*)     Anion Gap 7 (*)     All other components within normal limits   URINALYSIS - Abnormal; Notable for the following:     Appearance, UA Hazy (*)     Occult Blood UA 1+ (*)     Leukocytes, UA 1+ (*)     All other components within normal limits   B-TYPE NATRIURETIC PEPTIDE - Abnormal; Notable for the following:      (*)     All other components within normal limits   TROPONIN I - Abnormal; Notable for the following:     Troponin I 0.038 (*)     All other components within normal limits   URINALYSIS MICROSCOPIC - Abnormal; Notable for the following:     Bacteria, UA Few (*)     Non-Squam Epith 1 (*)     All other components within normal limits   LIPASE   MAGNESIUM   MAGNESIUM        All Lab Results:  Results for orders placed or performed during the hospital encounter of 07/10/18   CBC W/ AUTO DIFFERENTIAL   Result Value Ref Range    WBC 6.71 3.90 - 12.70 K/uL    RBC 3.46 (L) 4.00 - 5.40 M/uL    Hemoglobin 11.1 (L) 12.0 - 16.0 g/dL    Hematocrit 34.1 (L) 37.0 - 48.5 %    MCV 99 (H) 82 - 98 fL    MCH 32.1 (H) 27.0 - 31.0 pg    MCHC 32.6 32.0 - 36.0 g/dL    RDW 14.7 (H) 11.5 - 14.5 %    Platelets 124 (L) 150 - 350 K/uL    MPV 12.5 9.2 - 12.9 fL    Gran # (ANC) 4.8 1.8 - 7.7 K/uL    Lymph # 1.2 1.0 - 4.8 K/uL    Mono # 0.6 0.3 - 1.0 K/uL    Eos # 0.1 0.0 - 0.5 K/uL    Baso # 0.02 0.00 - 0.20 K/uL    Gran% 71.0 38.0 - 73.0 %    Lymph% 18.3 18.0 - 48.0 %    Mono% 8.9 4.0 - 15.0 %    Eosinophil% 1.6 0.0 - 8.0 %    Basophil% 0.3 0.0 - 1.9 %    Platelet Estimate Decreased (A)     Aniso Slight     Poik Slight     Ovalocytes Occasional     Tear Drop Cells Occasional     Stomatocytes Present     Large/Giant Platelets Present     Differential Method Automated    Comp. Metabolic Panel   Result Value Ref Range    Sodium 136 136 - 145 mmol/L     Potassium 4.7 3.5 - 5.1 mmol/L    Chloride 96 95 - 110 mmol/L    CO2 33 (H) 23 - 29 mmol/L    Glucose 116 (H) 70 - 110 mg/dL    BUN, Bld 21 10 - 30 mg/dL    Creatinine 0.8 0.5 - 1.4 mg/dL    Calcium 9.4 8.7 - 10.5 mg/dL    Total Protein 6.7 6.0 - 8.4 g/dL    Albumin 3.5 3.5 - 5.2 g/dL    Total Bilirubin 0.5 0.1 - 1.0 mg/dL    Alkaline Phosphatase 66 55 - 135 U/L    AST 19 10 - 40 U/L    ALT 16 10 - 44 U/L    Anion Gap 7 (L) 8 - 16 mmol/L    eGFR if African American >60 >60 mL/min/1.73 m^2    eGFR if non African American >60 >60 mL/min/1.73 m^2   Lipase   Result Value Ref Range    Lipase 6 4 - 60 U/L   Urinalysis - Clean Catch   Result Value Ref Range    Specimen UA Urine, Clean Catch     Color, UA Yellow Yellow, Straw, Zully    Appearance, UA Hazy (A) Clear    pH, UA 6.0 5.0 - 8.0    Specific Gravity, UA 1.010 1.005 - 1.030    Protein, UA Negative Negative    Glucose, UA Negative Negative    Ketones, UA Negative Negative    Bilirubin (UA) Negative Negative    Occult Blood UA 1+ (A) Negative    Nitrite, UA Negative Negative    Urobilinogen, UA Negative <2.0 EU/dL    Leukocytes, UA 1+ (A) Negative   Brain natriuretic peptide   Result Value Ref Range     (H) 0 - 99 pg/mL   Troponin I   Result Value Ref Range    Troponin I 0.038 (H) 0.000 - 0.026 ng/mL   Urinalysis Microscopic   Result Value Ref Range    RBC, UA 1 0 - 4 /hpf    WBC, UA 5 0 - 5 /hpf    Bacteria, UA Few (A) None-Occ /hpf    Squam Epithel, UA 1 /hpf    Non-Squam Epith 1 (A) <1/hpf /hpf    Amorphous, UA Occasional None-Moderate    Microscopic Comment SEE COMMENT          Imaging Results:  Imaging Results          X-Ray Chest 1 View (Final result)  Result time 07/10/18 10:41:59    Final result by Markus Soliz Jr., MD (07/10/18 10:41:59)                 Impression:      No acute findings.      Electronically signed by: Markus Soliz MD  Date:    07/10/2018  Time:    10:41             Narrative:    EXAMINATION:  XR CHEST 1 VIEW    CLINICAL  HISTORY:  Unspecified abdominal pain    COMPARISON:  05/07/2018    FINDINGS:  Heart size is normal.  Lungs appear clear of active disease.  No infiltrates or effusions.  No suspicious mass. Chronic changes in the spine and shoulders.                               X-Ray Abdomen Flat And Erect (Final result)  Result time 07/10/18 10:41:22    Final result by Markus Soliz Jr., MD (07/10/18 10:41:22)                 Impression:      No acute findings.      Electronically signed by: Markus Soliz MD  Date:    07/10/2018  Time:    10:41             Narrative:    EXAMINATION:  XR ABDOMEN FLAT AND ERECT    CLINICAL HISTORY:  Epigastric pain    COMPARISON:  No comparison studies available.    FINDINGS:  No free air.  No dilated large or small bowel loops.  Nothing to indicate intestinal obstruction.  No obvious soft tissue mass or unusual calcification.  Multilevel chronic changes in the thoracic and lumbar spine.  Suggestion of an old fracture involving the upper lumbar region.                                 The EKG was ordered, reviewed, and independently interpreted by the ED provider.  Interpretation time: 1009  Rate: 72 BPM  Rhythm: normal sinus rhythm and PACs  Interpretation: Minimal voltage criteria for LVH. Septal infarct. No STEMI.             The Emergency Provider reviewed the vital signs and test results, which are outlined above.    ED Discussion     11:24 AM: Discussed case with Radhika Zavaleta NP ([facility]). Dr. Welch agrees with current care and management of pt and accepts admission.   Admitting Service: Hospital medicine   Admitting Physician: Dr. Welch  Admit to: Obs Tele    11:27 AM: Re-evaluated pt. I have discussed test results, shared treatment plan, and the need for admission with patient and family at bedside. Pt and family express understanding at this time and agree with all information. All questions answered. Pt and family have no further questions or concerns at this time. Pt is  ready for admit.    11:30 AM: Pt states she has a living will and has it with her in the ED.    ED Medication(s):  Medications   pantoprazole EC tablet 40 mg (40 mg Oral Not Given 7/10/18 1015)   ranolazine 12 hr tablet 500 mg (not administered)   enoxaparin injection 40 mg (not administered)   albuterol-ipratropium 2.5 mg-0.5 mg/3 mL nebulizer solution 3 mL (3 mLs Nebulization Given 7/10/18 1356)   dasatinib (SPRYCEL) tablet 50 mg (not administered)   sodium chloride 0.9% flush 5 mL (not administered)   glucose chewable tablet 16 g (not administered)   glucose chewable tablet 24 g (not administered)   dextrose 50% injection 12.5 g (not administered)   dextrose 50% injection 25 g (not administered)   glucagon (human recombinant) injection 1 mg (not administered)   acetaminophen tablet 650 mg (not administered)   acetaminophen tablet 650 mg (not administered)   ondansetron injection 4 mg (not administered)   ramelteon tablet 8 mg (not administered)   senna-docusate 8.6-50 mg per tablet 2 tablet (not administered)   polyethylene glycol packet 17 g (17 g Oral Given 7/10/18 1502)   furosemide injection 40 mg (not administered)   GI cocktail (mylanta 30 mL, lidocaine 2 % viscous 10 mL, dicyclomine 10 mL) 50 mL (50 mLs Oral Given 7/10/18 1026)   furosemide injection 40 mg (40 mg Intravenous Given 7/10/18 1240)       New Prescriptions    No medications on file             Medical Decision Making    Medical Decision Making:   Clinical Tests:   Lab Tests: Reviewed and Ordered  Radiological Study: Ordered and Reviewed  Medical Tests: Ordered and Reviewed           Scribe Attestation:   Scribe #1: I performed the above scribed service and the documentation accurately describes the services I performed. I attest to the accuracy of the note.    Attending:   Physician Attestation Statement for Scribe #1: I, Jovanny Mills MD, personally performed the services described in this documentation, as scribed by Corinne Mack,  in my presence, and it is both accurate and complete.          Clinical Impression       ICD-10-CM ICD-9-CM   1. Elevated troponin R74.8 790.6   2. Epigastric pain R10.13 789.06   3. Abdominal pain R10.9 789.00   4. Acute on chronic congestive heart failure, unspecified heart failure type I50.9 428.0       Disposition:   Disposition: Placed in Observation  Condition: Fair           Jovanny Mills MD  07/10/18 4065

## 2018-07-10 NOTE — PT/OT/SLP PROGRESS
Occupational Therapy      Patient Name:  Carmela Maria   MRN:  [id number]    eval initiated via chart review, will complete eval on later date    Yancy Hernandez OT  7/10/2018

## 2018-07-10 NOTE — HPI
Mrs. Maria is a 89 y/o female with known Severe AS - mean gradient 93 mmHg, diastolic HF, COPD, CML. Patient presented to the ED with complaints of abdominal pain and SOB. Given GI cocktail with relief of abdominal pain.  Patient followed in cardiology clinic by Dr. Jeffers. Seen by Dr. Arango in May 2018 and was instructed to increase Lasix to 40mg  BID. Patient took extra Lasix for a few days, but resumed once daily dosing. Denies any chest pain. Has lower extremity edema, orthopnea and PND. Has responded well to IV lasix . , mild trop leak 0.038. CXR shows no acute findings.

## 2018-07-10 NOTE — ASSESSMENT & PLAN NOTE
-endstage AS  -Son and patient not ready for Hospice  -Cardiology consult: [place]  - no surgical intervention

## 2018-07-10 NOTE — HPI
Carmela Maria is a 89 yo female with PMHx of CML, aortic stenosis, diastolic heart failure, COPD, who presented to hospital with SOB. Associated s/s included abdominal epigastric pain. Last ECHO showed JAY .57 cm2, PA pressure 42, and calculated velocity of 98mmhg. In ER, troponin 0.038 and . She was given GI cocktail which resolved her abdominal pain. O2 was placed in ER and patient stated her SOB was better. She has been under the care of Cardiology. Son is the surrogate decision maker and has paperwork for DNR. Neither the patient nor the son is ready for the Hospice discussion at this time. At home she ambulates with walker and is incontinent. She was admitted with elevated troponin to Observation.

## 2018-07-10 NOTE — ASSESSMENT & PLAN NOTE
Patient presents to the ED with decompensated HF.  Patient also with  Known severe AS. -Was taking Lasix 40mg BID, but decreased dose of 40mng daily a few days ago. Has variable diet compliance  -  -has crackles and edema on exam  -Needs to diurese  -Agree with IV Lasix 40mg daily   -Monitor BMP   -Replace lytes as needed   -Mild troponin leak likely secondary to demand ischemia given her decompensated HF and severe AS

## 2018-07-10 NOTE — ASSESSMENT & PLAN NOTE
Acute on chronic diastolic HF likely due to Severe AS  -IV Lasix 40 mg daily  -Caution with diuretics due to AS - monitor B/P  -outpatient followup with heart failure clinic  -monitor labs  -Cardiology consult

## 2018-07-10 NOTE — H&P
[facility] Hospital Medicine  History & Physical    Patient Name: Carmela Maria  MRN: [id number]  Admission Date: 7/10/2018  Attending Physician: Jovanny Mills,*   Primary Care Provider: Aden Jones MD    Patient information was obtained from patient, spouse/SO, past medical records and ER records.     Subjective:     Principal Problem:Acute on chronic diastolic heart failure    Chief Complaint:   Chief Complaint   Patient presents with    Abdominal Pain     abdominal pain for several weeks and SOB . Denies any chest pain        HPI: Carmela Maria is a 89 yo female with PMHx of CML, aortic stenosis, diastolic heart failure, COPD, who presented to hospital with SOB. Associated s/s included abdominal epigastric pain. Last ECHO showed JAY .57 cm2, PA pressure 42, and calculated velocity of 98mmhg. In ER, troponin 0.038 and . She was given GI cocktail which resolved her abdominal pain. O2 was placed in ER and patient stated her SOB was better. She has been under the care of Cardiology. Son is the surrogate decision maker and has paperwork for DNR. Neither the patient nor the son is ready for the Hospice discussion at this time. At home she ambulates with walker and is incontinent. She was admitted with elevated troponin to Observation.     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 Unruly Ball    CHF (congestive heart failure)     Clotting disorder     COPD (chronic obstructive pulmonary disease)     Coronary artery disease     CVA (cerebral infarction) 11/13/2013    Depression     Fall 11/13/2013    Heart murmur     Hyperlipidemia     Osteoporosis 04/24/2017    Neck of femur (T score -2.5)    Palpitation 11/18/2015    Sleep apnea     Traumatic brain injury 11/13/2013    Ulcer        Past  Surgical History:   Procedure Laterality Date    APPENDECTOMY       SECTION      TUBAL LIGATION         Review of patient's allergies indicates:   Allergen Reactions    Asmanex twisthaler  [mometasone]      Other reaction(s): Rhinitis  Other reaction(s): Headache  Other reaction(s): Eye irritation    Aspirin      Other reaction(s): Unknown    Brovana  [arformoterol] Nausea Only     Other reaction(s): Headache  Other reaction(s): Dry Nose    Butisol  [butabarbital]      Other reaction(s): Unknown    Codeine      Other reaction(s): Unknown    Diazepam      Other reaction(s): Unknown    Iodine      Other reaction(s): Unknown    Limbitrol  [amitriptyline-chlordiazepoxide]      Other reaction(s): Unknown  Other reaction(s): Unknown    Medrol  [methylprednisolone]      Other reaction(s): Shortness of breath  Other reaction(s): Shortness of breath    Pcn [penicillins]     Sulfa (sulfonamide antibiotics)      Other reaction(s): Unknown       No current facility-administered medications on file prior to encounter.      Current Outpatient Prescriptions on File Prior to Encounter   Medication Sig    albuterol 90 mcg/actuation inhaler Inhale 2 puffs into the lungs every 6 (six) hours.    albuterol-ipratropium 2.5mg-0.5mg/3mL (DUO-NEB) 0.5 mg-3 mg(2.5 mg base)/3 mL nebulizer solution USE 1 VIAL VIA NEBULIZER EVERY 6 HOURS WHILE AWAKE    cholecalciferol, vitamin D3, (VITAMIN D3) 400 unit Chew Take by mouth. Take  tues - th - sat    dasatinib (SPRYCEL) 50 MG tablet Take 1 tablet (50 mg total) by mouth every other day.    furosemide (LASIX) 20 MG tablet Take 1 tablet (20 mg total) by mouth 2 (two) times daily. (Patient taking differently: Take 20 mg by mouth 2 (two) times daily. Pt takes two tablets once a day)    RANEXA 500 mg Tb12 TAKE 1 TABLET TWICE A DAY    ranitidine (ZANTAC) 150 MG tablet Take 1 tablet (150 mg total) by mouth 2 (two) times daily.    budesonide (PULMICORT) 0.5 mg/2 mL nebulizer  solution Take 2 mLs (0.5 mg total) by nebulization 2 (two) times daily.    ciclopirox (PENLAC) 8 % Soln Apply to affected toenails at night time DAILY. On 7th day, file nails down, clean all nails with alcohol and restart application process.    [DISCONTINUED] calcium carbonate (OS-KASIA) 600 mg (1,500 mg) Tab Take 600 mg by mouth once daily.     Family History     Problem Relation (Age of Onset)    COPD Mother, Sister, Brother    Cancer Sister    Heart disease Father        Social History Main Topics    Smoking status: Never Smoker    Smokeless tobacco: Never Used    Alcohol use No    Drug use: No    Sexual activity: No     Review of Systems   Constitutional: Positive for fatigue. Negative for appetite change, chills and fever.   HENT: Negative.  Negative for congestion, ear discharge, facial swelling, sore throat and trouble swallowing.    Eyes: Negative.  Negative for photophobia, pain, discharge, redness and visual disturbance.   Respiratory: Positive for shortness of breath. Negative for cough, chest tightness, wheezing and stridor.    Cardiovascular: Negative.  Negative for chest pain, palpitations and leg swelling.   Gastrointestinal: Positive for abdominal pain (epigastric pain). Negative for abdominal distention, anal bleeding, blood in stool, constipation, diarrhea, nausea, rectal pain and vomiting.   Endocrine: Negative.  Negative for cold intolerance, heat intolerance, polydipsia, polyphagia and polyuria.   Genitourinary: Negative.  Negative for difficulty urinating, dysuria, flank pain, frequency, hematuria, pelvic pain, urgency, vaginal bleeding and vaginal discharge.        Incontinent   Musculoskeletal: Positive for gait problem (uses walker). Negative for arthralgias, back pain, joint swelling, myalgias and neck pain.   Skin: Negative for color change, pallor, rash and wound.   Allergic/Immunologic: Negative.  Negative for food allergies and immunocompromised state.   Neurological: Positive for  weakness. Negative for dizziness, tremors, seizures, syncope, facial asymmetry, speech difficulty, light-headedness, numbness and headaches.   Hematological: Negative.  Negative for adenopathy. Does not bruise/bleed easily.   Psychiatric/Behavioral: Positive for dysphoric mood. Negative for agitation, confusion, hallucinations, sleep disturbance and suicidal ideas. The patient is not nervous/anxious.    All other systems reviewed and are negative.    Objective:     Vital Signs (Most Recent):  Temp: 98 °F (36.7 °C) (07/10/18 1155)  Pulse: 71 (07/10/18 1248)  Resp: 18 (07/10/18 1248)  BP: 132/64 (07/10/18 1005)  SpO2: (!) 86 % (07/10/18 1248) Vital Signs (24h Range):  Temp:  [98 °F (36.7 °C)-98.1 °F (36.7 °C)] 98 °F (36.7 °C)  Pulse:  [68-75] 71  Resp:  [13-20] 18  SpO2:  [86 %-100 %] 86 %  BP: (132-141)/(60-64) 132/64     Weight: 62.6 kg (138 lb)  Body mass index is 26.07 kg/m².    Physical Exam   Constitutional: She is oriented to person, place, and time. She appears well-developed and well-nourished. No distress.   HENT:   Head: Normocephalic and atraumatic.   Nose: Nose normal.   Eyes: Conjunctivae and EOM are normal. Pupils are equal, round, and reactive to light. Right eye exhibits no discharge. Left eye exhibits no discharge.   Neck: Normal range of motion. Neck supple. JVD present. No tracheal deviation present. No thyromegaly present.   Cardiovascular: Normal rate and regular rhythm.  Exam reveals no gallop and no friction rub.    Murmur (3/6 EDUARDO loudest at RSB) heard.  Pulmonary/Chest: No stridor. No respiratory distress. She has no wheezes. She has no rales. She exhibits no tenderness.   Rales   Abdominal: Soft. Bowel sounds are normal. She exhibits no distension and no mass. There is no tenderness. There is no guarding.   Musculoskeletal: Normal range of motion. She exhibits edema (2+ lower extremity edema). She exhibits no tenderness or deformity.   Lymphadenopathy:     She has no cervical adenopathy.    Neurological: She is alert and oriented to person, place, and time. She has normal reflexes. No cranial nerve deficit. Coordination normal.   Skin: Skin is warm and dry. No rash noted. She is not diaphoretic. No erythema. No pallor.   Psychiatric: Her behavior is normal. Judgment and thought content normal.   Oriented to person, place, time, circumstance.   Nursing note and vitals reviewed.        CRANIAL NERVES     CN III, IV, VI   Pupils are equal, round, and reactive to light.  Extraocular motions are normal.      Significant Labs:   CBC:   Recent Labs  Lab 07/10/18  1019   WBC 6.71   HGB 11.1*   HCT 34.1*   *     CMP:   Recent Labs  Lab 07/10/18  1019      K 4.7   CL 96   CO2 33*   *   BUN 21   CREATININE 0.8   CALCIUM 9.4   PROT 6.7   ALBUMIN 3.5   BILITOT 0.5   ALKPHOS 66   AST 19   ALT 16   ANIONGAP 7*   EGFRNONAA >60     Cardiac Markers:   Recent Labs  Lab 07/10/18  1019   *     Magnesium: pending  Significant Imaging: I have reviewed all pertinent imaging results/findings within the past 24 hours.   Imaging Results          X-Ray Chest 1 View (Final result)  Result time 07/10/18 10:41:59    Final result by Markus Soliz Jr., MD (07/10/18 10:41:59)                 Impression:      No acute findings.      Electronically signed by: Markus Soliz MD  Date:    07/10/2018  Time:    10:41             Narrative:    EXAMINATION:  XR CHEST 1 VIEW    CLINICAL HISTORY:  Unspecified abdominal pain    COMPARISON:  05/07/2018    FINDINGS:  Heart size is normal.  Lungs appear clear of active disease.  No infiltrates or effusions.  No suspicious mass. Chronic changes in the spine and shoulders.                               X-Ray Abdomen Flat And Erect (Final result)  Result time 07/10/18 10:41:22    Final result by Markus Soliz Jr., MD (07/10/18 10:41:22)                 Impression:      No acute findings.      Electronically signed by: Markus Soliz  MD  Date:    07/10/2018  Time:    10:41             Narrative:    EXAMINATION:  XR ABDOMEN FLAT AND ERECT    CLINICAL HISTORY:  Epigastric pain    COMPARISON:  No comparison studies available.    FINDINGS:  No free air.  No dilated large or small bowel loops.  Nothing to indicate intestinal obstruction.  No obvious soft tissue mass or unusual calcification.  Multilevel chronic changes in the thoracic and lumbar spine.  Suggestion of an old fracture involving the upper lumbar region.                                  Assessment/Plan:     * Acute on chronic diastolic heart failure    Acute on chronic diastolic HF likely due to Severe AS  -IV Lasix 40 mg daily  -Caution with diuretics due to AS - monitor B/P  -outpatient followup with heart failure clinic  -monitor labs  -Cardiology consult          Elevated troponin    -q 6 hours  -monitor          Chronic obstructive pulmonary disease    COPD   -Duonebs  -Oxygen  -Home O2 eval prior to discharge               CML (chronic myelocytic leukemia)    -Outpatient followup  -Continue Dasatinib 3 x week        Severe aortic stenosis    -endstage AS  -Son and patient not ready for Hospice  -Cardiology consult: [place]  - no surgical intervention                 VTE Risk Mitigation         Ordered     enoxaparin injection 40 mg  Daily      07/10/18 1332     IP VTE HIGH RISK PATIENT  Once      07/10/18 1332     Place sequential compression device  Until discontinued      07/10/18 1332     IP VTE HIGH RISK PATIENT  Once      07/10/18 1332        Kristen Zavaleta NP  Department of Hospital Medicine   [facility]

## 2018-07-11 VITALS
TEMPERATURE: 97 F | DIASTOLIC BLOOD PRESSURE: 49 MMHG | RESPIRATION RATE: 16 BRPM | HEIGHT: 63 IN | SYSTOLIC BLOOD PRESSURE: 101 MMHG | BODY MASS INDEX: 23.36 KG/M2 | WEIGHT: 131.81 LBS | HEART RATE: 56 BPM | OXYGEN SATURATION: 96 %

## 2018-07-11 LAB
ALBUMIN SERPL BCP-MCNC: 3.1 G/DL
ALP SERPL-CCNC: 59 U/L
ALT SERPL W/O P-5'-P-CCNC: 15 U/L
ANION GAP SERPL CALC-SCNC: 7 MMOL/L
AST SERPL-CCNC: 20 U/L
BASOPHILS # BLD AUTO: 0.02 K/UL
BASOPHILS NFR BLD: 0.3 %
BILIRUB SERPL-MCNC: 0.5 MG/DL
BUN SERPL-MCNC: 21 MG/DL
CALCIUM SERPL-MCNC: 8.9 MG/DL
CHLORIDE SERPL-SCNC: 97 MMOL/L
CO2 SERPL-SCNC: 34 MMOL/L
CREAT SERPL-MCNC: 0.8 MG/DL
DIFFERENTIAL METHOD: ABNORMAL
EOSINOPHIL # BLD AUTO: 0.2 K/UL
EOSINOPHIL NFR BLD: 2.1 %
ERYTHROCYTE [DISTWIDTH] IN BLOOD BY AUTOMATED COUNT: 14.7 %
EST. GFR  (AFRICAN AMERICAN): >60 ML/MIN/1.73 M^2
EST. GFR  (NON AFRICAN AMERICAN): >60 ML/MIN/1.73 M^2
GLUCOSE SERPL-MCNC: 86 MG/DL
HCT VFR BLD AUTO: 33.9 %
HGB BLD-MCNC: 10.9 G/DL
LYMPHOCYTES # BLD AUTO: 1.6 K/UL
LYMPHOCYTES NFR BLD: 22 %
MCH RBC QN AUTO: 32.2 PG
MCHC RBC AUTO-ENTMCNC: 32.2 G/DL
MCV RBC AUTO: 100 FL
MONOCYTES # BLD AUTO: 0.7 K/UL
MONOCYTES NFR BLD: 9.8 %
NEUTROPHILS # BLD AUTO: 4.7 K/UL
NEUTROPHILS NFR BLD: 65.8 %
PLATELET # BLD AUTO: 122 K/UL
PMV BLD AUTO: 13.7 FL
POTASSIUM SERPL-SCNC: 4.8 MMOL/L
PROT SERPL-MCNC: 5.9 G/DL
RBC # BLD AUTO: 3.39 M/UL
SODIUM SERPL-SCNC: 138 MMOL/L
WBC # BLD AUTO: 7.13 K/UL

## 2018-07-11 PROCEDURE — 63600175 PHARM REV CODE 636 W HCPCS: Performed by: NURSE PRACTITIONER

## 2018-07-11 PROCEDURE — G8979 MOBILITY GOAL STATUS: HCPCS | Mod: CJ

## 2018-07-11 PROCEDURE — 25000003 PHARM REV CODE 250: Performed by: NURSE PRACTITIONER

## 2018-07-11 PROCEDURE — 97530 THERAPEUTIC ACTIVITIES: CPT

## 2018-07-11 PROCEDURE — 85025 COMPLETE CBC W/AUTO DIFF WBC: CPT

## 2018-07-11 PROCEDURE — 94640 AIRWAY INHALATION TREATMENT: CPT

## 2018-07-11 PROCEDURE — 97116 GAIT TRAINING THERAPY: CPT | Mod: 59

## 2018-07-11 PROCEDURE — 80053 COMPREHEN METABOLIC PANEL: CPT

## 2018-07-11 PROCEDURE — 99214 OFFICE O/P EST MOD 30 MIN: CPT | Mod: ,,, | Performed by: INTERNAL MEDICINE

## 2018-07-11 PROCEDURE — 97161 PT EVAL LOW COMPLEX 20 MIN: CPT

## 2018-07-11 PROCEDURE — 25000003 PHARM REV CODE 250: Performed by: EMERGENCY MEDICINE

## 2018-07-11 PROCEDURE — G8987 SELF CARE CURRENT STATUS: HCPCS | Mod: CJ

## 2018-07-11 PROCEDURE — 25000242 PHARM REV CODE 250 ALT 637 W/ HCPCS: Performed by: NURSE PRACTITIONER

## 2018-07-11 PROCEDURE — G0378 HOSPITAL OBSERVATION PER HR: HCPCS

## 2018-07-11 PROCEDURE — 94761 N-INVAS EAR/PLS OXIMETRY MLT: CPT

## 2018-07-11 PROCEDURE — 97165 OT EVAL LOW COMPLEX 30 MIN: CPT

## 2018-07-11 PROCEDURE — G8978 MOBILITY CURRENT STATUS: HCPCS | Mod: CK

## 2018-07-11 PROCEDURE — G8988 SELF CARE GOAL STATUS: HCPCS | Mod: CK

## 2018-07-11 PROCEDURE — 36415 COLL VENOUS BLD VENIPUNCTURE: CPT

## 2018-07-11 RX ORDER — POLYETHYLENE GLYCOL 3350 17 G/17G
17 POWDER, FOR SOLUTION ORAL DAILY
Qty: 30 PACKET | Refills: 0 | Status: SHIPPED | OUTPATIENT
Start: 2018-07-12

## 2018-07-11 RX ORDER — AMOXICILLIN 250 MG
2 CAPSULE ORAL 2 TIMES DAILY PRN
COMMUNITY
Start: 2018-07-11

## 2018-07-11 RX ADMIN — FUROSEMIDE 40 MG: 10 INJECTION, SOLUTION INTRAMUSCULAR; INTRAVENOUS at 08:07

## 2018-07-11 RX ADMIN — IPRATROPIUM BROMIDE AND ALBUTEROL SULFATE 3 ML: .5; 3 SOLUTION RESPIRATORY (INHALATION) at 12:07

## 2018-07-11 RX ADMIN — POLYETHYLENE GLYCOL (3350) 17 G: 17 POWDER, FOR SOLUTION ORAL at 08:07

## 2018-07-11 RX ADMIN — IPRATROPIUM BROMIDE AND ALBUTEROL SULFATE 3 ML: .5; 3 SOLUTION RESPIRATORY (INHALATION) at 01:07

## 2018-07-11 RX ADMIN — RANOLAZINE 500 MG: 500 TABLET, FILM COATED, EXTENDED RELEASE ORAL at 08:07

## 2018-07-11 RX ADMIN — IPRATROPIUM BROMIDE AND ALBUTEROL SULFATE 3 ML: .5; 3 SOLUTION RESPIRATORY (INHALATION) at 07:07

## 2018-07-11 RX ADMIN — ACETAMINOPHEN 650 MG: 325 TABLET, FILM COATED ORAL at 02:07

## 2018-07-11 NOTE — SUBJECTIVE & OBJECTIVE
Interval History: diuresed well    Review of Systems   Constitution: Positive for weakness and malaise/fatigue.   HENT: Negative.    Eyes: Negative.    Cardiovascular: Negative.    Respiratory: Positive for shortness of breath.    Endocrine: Negative.    Skin: Negative.    Musculoskeletal: Negative.    Gastrointestinal: Negative.    Genitourinary: Negative.    Psychiatric/Behavioral: Negative.    Allergic/Immunologic: Negative.      Objective:     Vital Signs (Most Recent):  Temp: 96.7 °F (35.9 °C) (07/11/18 1155)  Pulse: 64 (07/11/18 1155)  Resp: 18 (07/11/18 1155)  BP: (!) 101/49 (07/11/18 1155)  SpO2: (!) 92 % (07/11/18 1155) Vital Signs (24h Range):  Temp:  [96.7 °F (35.9 °C)-98.1 °F (36.7 °C)] 96.7 °F (35.9 °C)  Pulse:  [61-76] 64  Resp:  [16-24] 18  SpO2:  [86 %-100 %] 92 %  BP: ()/(49-58) 101/49     Weight: 59.8 kg (131 lb 13.4 oz)  Body mass index is 23.35 kg/m².     SpO2: (!) 92 %  O2 Device (Oxygen Therapy): nasal cannula      Intake/Output Summary (Last 24 hours) at 07/11/18 1228  Last data filed at 07/11/18 0800   Gross per 24 hour   Intake              300 ml   Output             1250 ml   Net             -950 ml       Lines/Drains/Airways     Drain                 Urethral Catheter 07/10/18 1334 Non-latex 16 Fr. less than 1 day          Peripheral Intravenous Line                 Peripheral IV - Single Lumen 07/10/18 1019 Left Antecubital 1 day                Physical Exam   Constitutional: She is oriented to person, place, and time. She appears well-developed and well-nourished. No distress.   HENT:   Head: Normocephalic and atraumatic.   Nose: Nose normal.   Mouth/Throat: Oropharynx is clear and moist.   Eyes: Conjunctivae and EOM are normal. No scleral icterus.   Neck: Normal range of motion. Neck supple. JVD present. No thyromegaly present.   Cardiovascular: Normal rate, regular rhythm, S1 normal and S2 normal.  Exam reveals no gallop, no S3, no S4 and no friction rub.    Murmur  heard.  Pulmonary/Chest: Effort normal. No stridor. No respiratory distress. She has wheezes. She has no rales. She exhibits no tenderness.   Abdominal: Soft. Bowel sounds are normal. She exhibits no distension and no mass. There is no tenderness. There is no rebound.   Genitourinary:   Genitourinary Comments: Deferred   Musculoskeletal: Normal range of motion. She exhibits edema. She exhibits no tenderness or deformity.   Lymphadenopathy:     She has no cervical adenopathy.   Neurological: She is alert and oriented to person, place, and time. She exhibits normal muscle tone. Coordination normal.   Skin: Skin is warm and dry. No rash noted. She is not diaphoretic. No erythema. No pallor.   Psychiatric: She has a normal mood and affect. Her behavior is normal. Judgment and thought content normal.   Nursing note and vitals reviewed.      Significant Labs:   All pertinent lab results from the last 24 hours have been reviewed. and   Recent Lab Results       07/11/18  0437 07/10/18  2235 07/10/18  1816      Albumin 3.1(L)       Alkaline Phosphatase 59       ALT 15       Anion Gap 7(L)       AST 20       Baso # 0.02       Basophil% 0.3       Total Bilirubin 0.5  Comment:  For infants and newborns, interpretation of results should be based  on gestational age, weight and in agreement with clinical  observations.  Premature Infant recommended reference ranges:  Up to 24 hours.............<8.0 mg/dL  Up to 48 hours............<12.0 mg/dL  3-5 days..................<15.0 mg/dL  6-29 days.................<15.0 mg/dL         BUN, Bld 21       Calcium 8.9       Chloride 97       CO2 34(H)       Creatinine 0.8       Differential Method Automated       eGFR if  >60       eGFR if non  >60  Comment:  Calculation used to obtain the estimated glomerular filtration  rate (eGFR) is the CKD-EPI equation.          Eos # 0.2       Eosinophil% 2.1       Glucose 86       Gran # (ANC) 4.7       Gran% 65.8        Hematocrit 33.9(L)       Hemoglobin 10.9(L)       Lymph # 1.6       Lymph% 22.0       MCH 32.2(H)       MCHC 32.2       (H)       Mono # 0.7       Mono% 9.8       MPV 13.7(H)       Platelets 122(L)       Potassium 4.8       Total Protein 5.9(L)       RBC 3.39(L)       RDW 14.7(H)       Sodium 138       Troponin I  0.042  Comment:  The reference interval for Troponin I represents the 99th percentile   cutoff   for our facility and is consistent with 3rd generation assay   performance.  (H) 0.042  Comment:  The reference interval for Troponin I represents the 99th percentile   cutoff   for our facility and is consistent with 3rd generation assay   performance.  (H)     WBC 7.13             Significant Imaging: Echocardiogram:   2D echo with color flow doppler:   Results for orders placed or performed in visit on 05/09/18   2D echo with color flow doppler   Result Value Ref Range    EF 55 55 - 65    Mitral Valve Regurgitation TRIVIAL     Diastolic Dysfunction Yes (A)     Aortic Valve Stenosis SEVERE (A)     Est. PA Systolic Pressure 42.25 (A)     Tricuspid Valve Regurgitation TRIVIAL     and X-Ray: CXR: X-Ray Chest 1 View (CXR):   Results for orders placed or performed during the hospital encounter of 07/10/18   X-Ray Chest 1 View    Narrative    EXAMINATION:  XR CHEST 1 VIEW    CLINICAL HISTORY:  Unspecified abdominal pain    COMPARISON:  05/07/2018    FINDINGS:  Heart size is normal.  Lungs appear clear of active disease.  No infiltrates or effusions.  No suspicious mass. Chronic changes in the spine and shoulders.      Impression    No acute findings.      Electronically signed by: Faby Soliz MD  Date:    07/10/2018  Time:    10:41

## 2018-07-11 NOTE — PLAN OF CARE
Problem: Patient Care Overview  Goal: Plan of Care Review  Outcome: Ongoing (interventions implemented as appropriate)  Patient is on nasal cannula and tolerating well at this time. No distress noted.

## 2018-07-11 NOTE — DISCHARGE SUMMARY
[facility] Hospital Medicine  Discharge Summary      Patient Name: Carmela Maria  MRN: [id number]  Admission Date: 7/10/2018  Hospital Length of Stay: 0 days  Discharge Date and Time:  07/11/2018 4:40 PM  Attending Physician:Dr. Welch   Discharging Provider: Monica Oglesby NP  Primary Care Provider: Aden Jones MD      HPI:   Carmela Maria is a 89 yo female with PMHx of CML, aortic stenosis, diastolic heart failure, COPD, who presented to hospital with SOB. Associated s/s included abdominal epigastric pain. Last ECHO showed JAY .57 cm2, PA pressure 42, and calculated velocity of 98mmhg. In ER, troponin 0.038 and . She was given GI cocktail which resolved her abdominal pain. O2 was placed in ER and patient stated her SOB was better. She has been under the care of Cardiology. Son is the surrogate decision maker and has paperwork for DNR. Neither the patient nor the son is ready for the Hospice discussion at this time. At home she ambulates with walker and is incontinent. She was admitted with elevated troponin to Observation.     * No surgery found *      Hospital Course:   The pt is a 89 yo female with severe AS who was placed in observation for acute on chronic diastolic CHF on IV Lasix. The pt diuresed 1500ml. She will be discharged back on home dosage of Lasix.      Consults:   Consults         Status Ordering Provider     Case Management/  Once     Provider:  (Not yet assigned)    Completed RACHNA BERRIOS     Inpatient consult to Cardiology  Once     Provider:  Camden Arango MD    Completed RACHNA BERRIOS          No new Assessment & Plan notes have been filed under this hospital service since the last note was generated.  Service: Hospital Medicine    Final Active Diagnoses:    Diagnosis Date Noted POA    PRINCIPAL PROBLEM:  Acute on chronic diastolic heart failure [I50.33] 05/07/2018 Yes    Severe aortic stenosis [I35.0] 06/09/2013 Yes    Elevated troponin  [R74.8] 07/10/2018 Yes    Chronic obstructive pulmonary disease [J44.9] 08/12/2013 Yes    CML (chronic myelocytic leukemia) [C92.10] 06/14/2013 Yes      Problems Resolved During this Admission:    Diagnosis Date Noted Date Resolved POA       Discharged Condition: stable    Disposition: Home-Health Care c    Follow Up:  Follow-up Information     Aden Jones MD In 3 days.    Specialty:  Internal Medicine  Contact information:  [address] [place] [address]  Efren HAIRSTON [id number] [phone]             Follow up In 1 week.    Why:  CHF clinic                Patient Instructions:     Diet Cardiac   Order Comments: 2 gm NA  1500ml fluid restriction     Activity as tolerated         Significant Diagnostic Studies:  Imaging Results          X-Ray Chest 1 View (Final result)  Result time 07/10/18 10:41:59    Final result by Markus Soliz Jr., MD (07/10/18 10:41:59)                 Impression:      No acute findings.      Electronically signed by: Markus Soliz MD  Date:    07/10/2018  Time:    10:41             Narrative:    EXAMINATION:  XR CHEST 1 VIEW    CLINICAL HISTORY:  Unspecified abdominal pain    COMPARISON:  05/07/2018    FINDINGS:  Heart size is normal.  Lungs appear clear of active disease.  No infiltrates or effusions.  No suspicious mass. Chronic changes in the spine and shoulders.                               X-Ray Abdomen Flat And Erect (Final result)  Result time 07/10/18 10:41:22    Final result by Markus Soliz Jr., MD (07/10/18 10:41:22)                 Impression:      No acute findings.      Electronically signed by: Markus Soliz MD  Date:    07/10/2018  Time:    10:41             Narrative:    EXAMINATION:  XR ABDOMEN FLAT AND ERECT    CLINICAL HISTORY:  Epigastric pain    COMPARISON:  No comparison studies available.    FINDINGS:  No free air.  No dilated large or small bowel loops.  Nothing to indicate intestinal obstruction.  No obvious soft tissue mass or unusual calcification.   Multilevel chronic changes in the thoracic and lumbar spine.  Suggestion of an old fracture involving the upper lumbar region.                                Pending Diagnostic Studies:     None         Medications:  Reconciled Home Medications:      Medication List      START taking these medications    polyethylene glycol 17 gram Pwpk  Commonly known as:  GLYCOLAX  Take 17 g by mouth once daily. Hold for diarrhea  Start taking on:  7/12/2018     senna-docusate 8.6-50 mg 8.6-50 mg per tablet  Commonly known as:  PERICOLACE  Take 2 tablets by mouth 2 (two) times daily as needed for Constipation.        CHANGE how you take these medications    furosemide 20 MG tablet  Commonly known as:  LASIX  Take 1 tablet (20 mg total) by mouth 2 (two) times daily.  What changed:  additional instructions        CONTINUE taking these medications    albuterol 90 mcg/actuation inhaler  Inhale 2 puffs into the lungs every 6 (six) hours.     albuterol-ipratropium 2.5 mg-0.5 mg/3 mL nebulizer solution  Commonly known as:  DUO-NEB  USE 1 VIAL VIA NEBULIZER EVERY 6 HOURS WHILE AWAKE     budesonide 0.5 mg/2 mL nebulizer solution  Commonly known as:  PULMICORT  Take 2 mLs (0.5 mg total) by nebulization 2 (two) times daily.     ciclopirox 8 % Soln  Commonly known as:  PENLAC  Apply to affected toenails at night time DAILY. On 7th day, file nails down, clean all nails with alcohol and restart application process.     dasatinib 50 MG tablet  Commonly known as:  SPRYCEL  Take 1 tablet (50 mg total) by mouth every other day.     RANEXA 500 MG Tb12  Generic drug:  ranolazine  TAKE 1 TABLET TWICE A DAY     ranitidine 150 MG tablet  Commonly known as:  ZANTAC  Take 1 tablet (150 mg total) by mouth 2 (two) times daily.     VITAMIN D3 400 unit Chew  Generic drug:  cholecalciferol (vitamin D3)  Take by mouth. Take  tues - thurs - sat        STOP taking these medications    calcium carbonate 600 mg calcium (1,500 mg) Tab  Commonly known as:  OS-KASIA             Indwelling Lines/Drains at time of discharge:   Lines/Drains/Airways          No matching active lines, drains, or airways          Time spent on the discharge of patient: 42 minutes  Patient was seen and examined on the date of discharge and determined to be suitable for discharge.         Monica Oglesby NP  Department of Hospital Medicine  [facility]

## 2018-07-11 NOTE — PLAN OF CARE
Problem: Patient Care Overview  Goal: Plan of Care Review  Outcome: Ongoing (interventions implemented as appropriate)  Tolerates txs well; pt on nc 2l/m; no resp distress noted.

## 2018-07-11 NOTE — ASSESSMENT & PLAN NOTE
Patient presents to the ED with decompensated HF.  Patient also with  Known severe AS. -Was taking Lasix 40mg BID, but decreased dose of 40mng daily a few days ago. Has variable diet compliance  -  -has crackles and edema on exam  -Needs to diurese  -Agree with IV Lasix 40mg daily   -Monitor BMP   -Replace lytes as needed   -Mild troponin leak likely secondary to demand ischemia given her decompensated HF and severe AS    Diuresed well, mild edema present; discussed she needs close monitoring and home health

## 2018-07-11 NOTE — PLAN OF CARE
order obtained. Patient selected [facility]. Choice form signed, placed in chart and copy given to patient.     @12:52 Referral submitted to [facility].     @1335 Spoke with Shelby at [facility], declined referral due to inability to staff patient's visit. Patient and family's second choice is [facility], choice form signed and referral submitted via [employer].     [facility] accepted patient.

## 2018-07-11 NOTE — PROGRESS NOTES
[facility] -   Cardiology  Progress Note    Patient Name: Carmela Maria  MRN: [id number]  Admission Date: 7/10/2018  Hospital Length of Stay: 0 days  Code Status: DNR   Attending Physician: Fede Welch MD   Primary Care Physician: Aden Jones MD  Expected Discharge Date: 7/11/2018  Principal Problem:Acute on chronic diastolic heart failure    Subjective:     Hospital Course:   7/11/18 - Pt improved symptoms, neg 1.1 L overnight, no CP, mild SOB, leg edema improve. Discussed in detail with pt and  overall situation with severe AS and difficult in management, will need home health CHF nursing and maybe home oxygen if qualifies.     Interval History: diuresed well    Review of Systems   Constitution: Positive for weakness and malaise/fatigue.   HENT: Negative.    Eyes: Negative.    Cardiovascular: Negative.    Respiratory: Positive for shortness of breath.    Endocrine: Negative.    Skin: Negative.    Musculoskeletal: Negative.    Gastrointestinal: Negative.    Genitourinary: Negative.    Psychiatric/Behavioral: Negative.    Allergic/Immunologic: Negative.      Objective:     Vital Signs (Most Recent):  Temp: 96.7 °F (35.9 °C) (07/11/18 1155)  Pulse: 64 (07/11/18 1155)  Resp: 18 (07/11/18 1155)  BP: (!) 101/49 (07/11/18 1155)  SpO2: (!) 92 % (07/11/18 1155) Vital Signs (24h Range):  Temp:  [96.7 °F (35.9 °C)-98.1 °F (36.7 °C)] 96.7 °F (35.9 °C)  Pulse:  [61-76] 64  Resp:  [16-24] 18  SpO2:  [86 %-100 %] 92 %  BP: ()/(49-58) 101/49     Weight: 59.8 kg (131 lb 13.4 oz)  Body mass index is 23.35 kg/m².     SpO2: (!) 92 %  O2 Device (Oxygen Therapy): nasal cannula      Intake/Output Summary (Last 24 hours) at 07/11/18 1228  Last data filed at 07/11/18 0800   Gross per 24 hour   Intake              300 ml   Output             1250 ml   Net             -950 ml       Lines/Drains/Airways     Drain                 Urethral Catheter 07/10/18 1334 Non-latex 16 Fr. less than 1 day           Peripheral Intravenous Line                 Peripheral IV - Single Lumen 07/10/18 1019 Left Antecubital 1 day                Physical Exam   Constitutional: She is oriented to person, place, and time. She appears well-developed and well-nourished. No distress.   HENT:   Head: Normocephalic and atraumatic.   Nose: Nose normal.   Mouth/Throat: Oropharynx is clear and moist.   Eyes: Conjunctivae and EOM are normal. No scleral icterus.   Neck: Normal range of motion. Neck supple. JVD present. No thyromegaly present.   Cardiovascular: Normal rate, regular rhythm, S1 normal and S2 normal.  Exam reveals no gallop, no S3, no S4 and no friction rub.    Murmur heard.  Pulmonary/Chest: Effort normal. No stridor. No respiratory distress. She has wheezes. She has no rales. She exhibits no tenderness.   Abdominal: Soft. Bowel sounds are normal. She exhibits no distension and no mass. There is no tenderness. There is no rebound.   Genitourinary:   Genitourinary Comments: Deferred   Musculoskeletal: Normal range of motion. She exhibits edema. She exhibits no tenderness or deformity.   Lymphadenopathy:     She has no cervical adenopathy.   Neurological: She is alert and oriented to person, place, and time. She exhibits normal muscle tone. Coordination normal.   Skin: Skin is warm and dry. No rash noted. She is not diaphoretic. No erythema. No pallor.   Psychiatric: She has a normal mood and affect. Her behavior is normal. Judgment and thought content normal.   Nursing note and vitals reviewed.      Significant Labs:   All pertinent lab results from the last 24 hours have been reviewed. and   Recent Lab Results       07/11/18  0437 07/10/18  2235 07/10/18  1816      Albumin 3.1(L)       Alkaline Phosphatase 59       ALT 15       Anion Gap 7(L)       AST 20       Baso # 0.02       Basophil% 0.3       Total Bilirubin 0.5  Comment:  For infants and newborns, interpretation of results should be based  on gestational age, weight and in  agreement with clinical  observations.  Premature Infant recommended reference ranges:  Up to 24 hours.............<8.0 mg/dL  Up to 48 hours............<12.0 mg/dL  3-5 days..................<15.0 mg/dL  6-29 days.................<15.0 mg/dL         BUN, Bld 21       Calcium 8.9       Chloride 97       CO2 34(H)       Creatinine 0.8       Differential Method Automated       eGFR if  >60       eGFR if non  >60  Comment:  Calculation used to obtain the estimated glomerular filtration  rate (eGFR) is the CKD-EPI equation.          Eos # 0.2       Eosinophil% 2.1       Glucose 86       Gran # (ANC) 4.7       Gran% 65.8       Hematocrit 33.9(L)       Hemoglobin 10.9(L)       Lymph # 1.6       Lymph% 22.0       MCH 32.2(H)       MCHC 32.2       (H)       Mono # 0.7       Mono% 9.8       MPV 13.7(H)       Platelets 122(L)       Potassium 4.8       Total Protein 5.9(L)       RBC 3.39(L)       RDW 14.7(H)       Sodium 138       Troponin I  0.042  Comment:  The reference interval for Troponin I represents the 99th percentile   cutoff   for our facility and is consistent with 3rd generation assay   performance.  (H) 0.042  Comment:  The reference interval for Troponin I represents the 99th percentile   cutoff   for our facility and is consistent with 3rd generation assay   performance.  (H)     WBC 7.13             Significant Imaging: Echocardiogram:   2D echo with color flow doppler:   Results for orders placed or performed in visit on 05/09/18   2D echo with color flow doppler   Result Value Ref Range    EF 55 55 - 65    Mitral Valve Regurgitation TRIVIAL     Diastolic Dysfunction Yes (A)     Aortic Valve Stenosis SEVERE (A)     Est. PA Systolic Pressure 42.25 (A)     Tricuspid Valve Regurgitation TRIVIAL     and X-Ray: CXR: X-Ray Chest 1 View (CXR):   Results for orders placed or performed during the hospital encounter of 07/10/18   X-Ray Chest 1 View    Narrative    EXAMINATION:  XR  CHEST 1 VIEW    CLINICAL HISTORY:  Unspecified abdominal pain    COMPARISON:  05/07/2018    FINDINGS:  Heart size is normal.  Lungs appear clear of active disease.  No infiltrates or effusions.  No suspicious mass. Chronic changes in the spine and shoulders.      Impression    No acute findings.      Electronically signed by: Faby Soliz MD  Date:    07/10/2018  Time:    10:41     Assessment and Plan:     Brief HPI: diursed overnight    * Acute on chronic diastolic heart failure    Patient presents to the ED with decompensated HF.  Patient also with  Known severe AS. -Was taking Lasix 40mg BID, but decreased dose of 40mng daily a few days ago. Has variable diet compliance  -  -has crackles and edema on exam  -Needs to diurese  -Agree with IV Lasix 40mg daily   -Monitor BMP   -Replace lytes as needed   -Mild troponin leak likely secondary to demand ischemia given her decompensated HF and severe AS    Diuresed well, mild edema present; discussed she needs close monitoring and home health        Elevated troponin    Secondary to demand ischemia  No ischemic workup needed now        Severe aortic stenosis    See plan for CHF            VTE Risk Mitigation         Ordered     enoxaparin injection 40 mg  Daily      07/10/18 1332     IP VTE HIGH RISK PATIENT  Once      07/10/18 1332     Place sequential compression device  Until discontinued      07/10/18 1332          Camden Arango Md, MD  Cardiology  [facility] -

## 2018-07-11 NOTE — PLAN OF CARE
CM met with patient and son, Luis Antonio who is POA and primary caregiver with his wife, Cristiane. Son has a house down the street from his mother but reports he and his wife, Cristiane, have been staying with his mother daily with the exception of about an hour a day, since his father passed May of this year. DC Plan: Home with home health. PT Eval pending. Patient and family in agreement with DC Plan.        18 0915   Discharge Assessment   Assessment Type Discharge Planning Assessment   Confirmed/corrected address and phone number on facesheet? Yes   Assessment information obtained from? Patient;Caregiver  (Ede Maria, paul, [phone] cell)   Expected Length of Stay (days) 1   Communicated expected length of stay with patient/caregiver yes   Prior to hospitilization cognitive status: Alert/Oriented   Prior to hospitalization functional status: Needs Assistance;Partially Dependent  (Son stays with her daily with the except of approximately an hour a day since May 2018, after her  . )   Current cognitive status: Alert/Oriented   Current Functional Status: Needs Assistance;Partially Dependent   Lives With child(antoni), adult  (Son has a house down the street from her but stays with her daily with an exception of 1 hour a day)   Able to Return to Prior Arrangements yes   Is patient able to care for self after discharge? No  (son and daughter in law care for her )   Who are your caregiver(s) and their phone number(s)? (paul Mera and Cristiane Maria, daughter in law, [phone] )   Patient's perception of discharge disposition home health   Readmission Within The Last 30 Days no previous admission in last 30 days   Patient currently being followed by outpatient case management? No   Patient currently receives any other outside agency services? No   Equipment Currently Used at Home rollator   Do you have any problems affording any of your prescribed medications? No   Is the patient taking medications as  prescribed? yes   Does the patient have transportation home? Yes   Transportation Available family or friend will provide   Dialysis Name and Scheduled days n/a   Does the patient receive services at the [facility]? No   Discharge Plan A Home Health   Patient/Family In Agreement With Plan yes

## 2018-07-11 NOTE — PT/OT/SLP EVAL
Physical Therapy Evaluation    Patient Name:  Carmela Maria   MRN:  [id number]    Recommendations:     Discharge Recommendations:  home health PT   Discharge Equipment Recommendations: none   Barriers to discharge: None    Assessment:     Carmela Maria is a 89 y.o. female admitted with a medical diagnosis of Acute on chronic diastolic heart failure.  She presents with the following impairments/functional limitations:  weakness, impaired functional mobilty, impaired endurance, gait instability, impaired balance, decreased lower extremity function, pain, edema, decreased ROM, decreased safety awareness, impaired self care skills .    Rehab Prognosis:  GOOD; patient would benefit from acute skilled PT services to address these deficits and reach maximum level of function.      Recent Surgery: * No surgery found *      Plan:     During this hospitalization, patient to be seen   to address the above listed problems via gait training, therapeutic activities, therapeutic exercises  · Plan of Care Expires:  07/18/18   Plan of Care Reviewed with: patient    Subjective     Communicated with NURSE HEARD AND EPIC CHART REVIEW prior to session.  Patient found SUP IN BED WITH  PRESENT upon PT entry to room, agreeable to evaluation.      Chief Complaint: R LEG PAIN  Patient comments/goals: I WANT TO GO HOME  Pain/Comfort:  · Pain Rating 1: 5/10  · Location - Side 1: Right  · Location 1: leg  · Pain Rating Post-Intervention 1: 5/10    Patients cultural, spiritual, Denominational conflicts given the current situation:      Living Environment:  PT LIVES AT HOME WITH  AND HAS A RAMP TO ENTER HOME  Prior to admission, patients level of function was MOD I WITH ROLLATOR.  Patient has the following equipment: none.  DME owned (not currently used): rolling walker, shower chair and ROLLATOR.  Upon discharge, patient will have assistance from FAMILY.    Objective:     Patient found with: peripheral IV, telemetry     General  Precautions: Standard, fall   Orthopedic Precautions:N/A   Braces: N/A     Exams:  · RLE ROM: LIMITED  · RLE Strength: LIMITED  · LLE ROM: WFL  · LLE Strength: LIMITED    Functional Mobility:  · PT SUP>SIT EOB WITH MIN A. PT SCOOTED TO EOB WITH MIN A. PT STOOD WITH ROLLATOR AND GT TRAINED X 60' WITH MIN A. PT RETURNED TO RM T/F TO CHAIR WITH ROLLATOR. PT EDUCATED ON ROLE OF P.T. AND LEFT WITH ALL NEEDS MET AND FAMILY PRESENT    AM-PAC 6 CLICK MOBILITY  Total Score:16     Patient left up in chair with call button in reach.    GOALS:    Physical Therapy Goals        Problem: Physical Therapy Goal    Goal Priority Disciplines Outcome Goal Variances Interventions   Physical Therapy Goal     PT/OT, PT      Description:  PT WILL BE SEEN FOR P.T. FOR A MIN OF 5 OUT OF 7 DAYS A WEEK  LT18  1. PT WILL COMPLETE BED MOBILITY WITH S  2. PT WILL T/F TO CHAIR WITH ROLLATOR AND S.  3. PT WILL GT TRAIN X 150' WITH ROLLATOR AND SBA  4. 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 Unruly Ball    CHF (congestive heart failure)     Clotting disorder     COPD (chronic obstructive pulmonary disease)     Coronary artery disease     CVA (cerebral infarction) 2013    Depression     Fall 2013    Heart murmur     Hyperlipidemia     Osteoporosis 2017    Neck of femur (T score -2.5)    Palpitation 2015    Sleep apnea     Traumatic brain injury 2013    Ulcer        Past Surgical History:   Procedure Laterality Date    APPENDECTOMY       SECTION      TUBAL LIGATION         Clinical Decision Making:     History  Co-morbidities and personal factors that may impact the plan of care Examination  Body Structures and Functions, activity limitations  and participation restrictions that may impact the plan of care Clinical Presentation   Decision Making/ Complexity Score   Co-morbidities:   [] Time since onset of injury / illness / exacerbation  [] Status of current condition  []Patient's cognitive status and safety concerns    [] Multiple Medical Problems (see med hx)  Personal Factors:   [] Patient's age  [] Prior Level of function   [] Patient's home situation (environment and family support)  [] Patient's level of motivation  [] Expected progression of patient      HISTORY:(criteria)    [] 60888 - no personal factors/history    [] 71742 - has 1-2 personal factor/comorbidity     [] 13371 - has >3 personal factor/comorbidity     Body Regions:  [] Objective examination findings  [] Head     []  Neck  [] Trunk   [] Upper Extremity  [] Lower Extremity    Body Systems:  [] For communication ability, affect, cognition, language, and learning style: the assessment of the ability to make needs known, consciousness, orientation (person, place, and time), expected emotional /behavioral responses, and learning preferences (eg, learning barriers, education  needs)  [] For the neuromuscular system: a general assessment of gross coordinated movement (eg, balance, gait, locomotion, transfers, and transitions) and motor function  (motor control and motor learning)  [] For the musculoskeletal system: the assessment of gross symmetry, gross range of motion, gross strength, height, and weight  [] For the integumentary system: the assessment of pliability(texture), presence of scar formation, skin color, and skin integrity  [] For cardiovascular/pulmonary system: the assessment of heart rate, respiratory rate, blood pressure, and edema     Activity limitations:    [] Patient's cognitive status and saf ety concerns          [] Status of current condition      [] Weight bearing restriction  [] Cardiopulmunary Restriction    Participation Restrictions:   [] Goals and goal agreement  with the patient     [] Rehab potential (prognosis) and probable outcome      Examination of Body System: (criteria)    [] 17464 - addressing 1-2 elements    [] 74916 - addressing a total of 3 or more elements     [] 02352 -  Addressing a total of 4 or more elements         Clinical Presentation: (criteria)  Choose one     On examination of body system using standardized tests and measures patient presents with (CHOOSE ONE) elements from any of the following: body structures and functions, activity limitations, and/or participation restrictions.  Leading to a clinical presentation that is considered (CHOOSE ONE)                              Clinical Decision Making  (Eval Complexity):  Choose One     Time Tracking:     PT Received On: 07/11/18  PT Start Time: 0832     PT Stop Time: 0855  PT Total Time (min): 23 min     Billable Minutes: Evaluation 13 and Gait Training 10      Evelyn Zayas, PT  07/11/2018

## 2018-07-11 NOTE — PLAN OF CARE
Problem: Pressure Ulcer Risk (Wallace Scale) (Adult,Obstetrics,Pediatric)  Intervention: Turn/Reposition Often  Patient awake and alert resting in bed son at bedside  free from falls and injury, normal sinus rhythm on monitor, denies pain at this time, cole catheter intact with clear yellow urine, frequent weight shift assistance provided, skin warm and dry with redness and scabs, abdomen soft non distended, POC reviewed with patient and son,  bed low locked, call light within reach, will continue to monitor

## 2018-07-11 NOTE — HOSPITAL COURSE
7/11/18 - Pt improved symptoms, neg 1.1 L overnight, no CP, mild SOB, leg edema improve. Discussed in detail with pt and  overall situation with severe AS and difficult in management, will need home health CHF nursing and maybe home oxygen if qualifies.

## 2018-07-11 NOTE — PT/OT/SLP EVAL
Occupational Therapy   Evaluation    Name: Carmela Maria  MRN: [id number]  Admitting Diagnosis:  Acute on chronic diastolic heart failure      Recommendations:     Discharge Recommendations: home health OT  Discharge Equipment Recommendations:  none  Barriers to discharge:       History:     Occupational Profile:  Living Environment: lives alone with nearby family memeber  Previous level of function: (i) with adl's and mod (i) with functional mobility  Roles and Routines: occupational therapy  Equipment Owned:  none  Assistance upon Discharge:     Past Medical History:   Diagnosis Date    Acid reflux     Anemia     Anxiety     Aortic stenosis, severe 2018    Arthritis     Asthma     Cancer     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 Unruly Ball    CHF (congestive heart failure)     Clotting disorder     COPD (chronic obstructive pulmonary disease)     Coronary artery disease     CVA (cerebral infarction) 2013    Depression     Fall 2013    Heart murmur     Hyperlipidemia     Osteoporosis 2017    Neck of femur (T score -2.5)    Palpitation 2015    Sleep apnea     Traumatic brain injury 2013    Ulcer        Past Surgical History:   Procedure Laterality Date    APPENDECTOMY       SECTION      TUBAL LIGATION         Subjective     Chief Complaint: debility and gneralized weakness  Patient/Family stated goals:   Communicated with: nurse brad and [facility] chart review prior to session.  Pain/Comfort:  · Pain Rating 1: 5/10  · Location - Side 1: Bilateral  · Location - Orientation 1: generalized    Patients cultural, spiritual, Taoism conflicts given the current situation:      Objective:     Patient found with:      General Precautions: Standard,     Orthopedic Precautions:N/A   Braces: N/A     Occupational Performance:    Bed Mobility:    · Patient completed Rolling/Turning to Left with  stand by assistance  · Patient completed  Scooting/Bridging with stand by assistance  · Patient completed Supine to Sit with contact guard assistance    Functional Mobility/Transfers:  · Patient completed Sit <> Stand Transfer with contact guard assistance  with  no assistive device   · Patient completed Bed <> Chair Transfer using Squat Pivot technique with contact guard assistance with no assistive device      Activities of Daily Living:  · Feeding:  supervision and simpe drinking activity x1  · Lower Body Dressing: contact guard assistance x1    Cognitive/Visual Perceptual:  Cognitive/Psychosocial Skills:     -       Oriented to: Person, Place, Time and Situation   -       Follows Commands/attention:Follows multistep  commands  -       Communication: clear/fluent  -       Memory: No Deficits noted  -       Safety awareness/insight to disability: impaired   Visual/Perceptual:      -Intact    Physical Exam:  Upper Extremity Range of Motion:  -       Right Upper Extremity: WFL  -       Left Upper Extremity: WFL  Upper Extremity Strength:    -       Right Upper Extremity: mmt: 3/5 grossly  -       Left Upper Extremity: mmt: 3/5 grossly   Strength:    -       Right Upper Extremity: mmt: 3/5 grossly  -       Left Upper Extremity: mmt: 3/5 grossly    Patient left up in chair with all lines intact, call button in reach, nurse  notified and family present    [facility] 6 Click:  [facility] Total Score: 20    Treatment & Education:    Education:    Assessment:     Carmela Maria is a 89 y.o. female with a medical diagnosis of Acute on chronic diastolic heart failure.  She presents with performance deficits affecting function are weakness, impaired self care skills, impaired balance, decreased safety awareness, impaired endurance, impaired functional mobilty, decreased upper extremity function, gait instability.      Rehab Prognosis:  Fair+; patient would benefit from acute skilled OT services to address these deficits and reach maximum level of function.    "      Clinical Decision Makin.  OT Low:  "Pt evaluation falls under low complexity for evaluation coding due to performance deficits noted in 1-3 areas as stated above and 0 co-morbities affecting current functional status. Data obtained from problem focused assessments. No modifications or assistance was required for completion of evaluation. Only brief occupational profile and history review completed."     Plan:     Patient to be seen 3 x/week to address the above listed problems via self-care/home management, therapeutic activities, therapeutic exercises  · Plan of Care Expires:    · Plan of Care Reviewed with: patient    This Plan of care has been discussed with the patient who was involved in its development and understands and is in agreement with the identified goals and treatment plan    GOALS:    Occupational Therapy Goals        Problem: Occupational Therapy Goal    Goal Priority Disciplines Outcome Interventions   Occupational Therapy Goal     OT, PT/OT     Description:  Goals to be met by: 18    Patient will increase functional independence with ADLs by performing:    UE Dressing with Set-up Assistance.  LE Dressing with Set-up Assistance.  Toilet transfer to toilet with Stand-by Assistance.  Upper extremity exercise program x10 reps  With min resistance                    Time Tracking:     OT Date of Treatment: 18  OT Start Time: 1210  OT Stop Time: 1240  OT Total Time (min): 30 min    Billable Minutes:Evaluation 15 minutes  Therapeutic Activity 15 minutes    Yancy Hernandez OT  2018    "

## 2018-07-11 NOTE — ED NOTES
Report attempted, telemetry charge nurse reports that they will need 15 minutes to assign the patient to a nurse and will notify assigned ER nurse when room will be available

## 2018-07-11 NOTE — HOSPITAL COURSE
The pt is a 89 yo female with severe AS who was placed in observation for acute on chronic diastolic CHF on IV Lasix. The pt diuresed 1500ml. She will be discharged back on home dosage of Lasix.

## 2018-07-12 ENCOUNTER — TELEPHONE (OUTPATIENT)
Dept: CARDIOLOGY | Facility: CLINIC | Age: 83
End: 2018-07-12

## 2018-07-12 NOTE — TELEPHONE ENCOUNTER
----- Message from Erika Marcano sent at 7/12/2018  2:08 PM CDT -----  Contact: Cochran/son  Rekha called and stated the patient seen Dr. Arango while she was inpatient and he told her to call and get worked in to come and see him either Monday or Tuesday. He can be contacted at [phone] or [phone].    Thanks,  Erika

## 2018-07-13 DIAGNOSIS — J44.9 CHRONIC OBSTRUCTIVE PULMONARY DISEASE, UNSPECIFIED COPD TYPE: ICD-10-CM

## 2018-07-13 RX ORDER — IPRATROPIUM BROMIDE AND ALBUTEROL SULFATE 2.5; .5 MG/3ML; MG/3ML
SOLUTION RESPIRATORY (INHALATION)
Qty: 1080 ML | Refills: 3 | Status: SHIPPED | OUTPATIENT
Start: 2018-07-13

## 2018-07-13 RX ORDER — ALBUTEROL SULFATE 90 UG/1
2 AEROSOL, METERED RESPIRATORY (INHALATION) EVERY 6 HOURS
Qty: 1 INHALER | Refills: 12 | Status: SHIPPED | OUTPATIENT
Start: 2018-07-13 | End: 2019-07-13

## 2018-07-19 ENCOUNTER — OFFICE VISIT (OUTPATIENT)
Dept: CARDIOLOGY | Facility: CLINIC | Age: 83
End: 2018-07-19
Payer: MEDICARE

## 2018-07-19 VITALS
SYSTOLIC BLOOD PRESSURE: 122 MMHG | HEART RATE: 70 BPM | HEIGHT: 63 IN | BODY MASS INDEX: 23.75 KG/M2 | DIASTOLIC BLOOD PRESSURE: 78 MMHG | WEIGHT: 134.06 LBS

## 2018-07-19 DIAGNOSIS — R79.89 ELEVATED TROPONIN: ICD-10-CM

## 2018-07-19 DIAGNOSIS — J42 CHRONIC BRONCHITIS, UNSPECIFIED CHRONIC BRONCHITIS TYPE: ICD-10-CM

## 2018-07-19 DIAGNOSIS — I35.0 SEVERE AORTIC STENOSIS: ICD-10-CM

## 2018-07-19 DIAGNOSIS — I50.33 ACUTE ON CHRONIC DIASTOLIC HEART FAILURE: Primary | ICD-10-CM

## 2018-07-19 PROCEDURE — 99213 OFFICE O/P EST LOW 20 MIN: CPT | Mod: PBBFAC | Performed by: INTERNAL MEDICINE

## 2018-07-19 PROCEDURE — 99215 OFFICE O/P EST HI 40 MIN: CPT | Mod: S$PBB,,, | Performed by: INTERNAL MEDICINE

## 2018-07-19 PROCEDURE — 99999 PR PBB SHADOW E&M-EST. PATIENT-LVL III: CPT | Mod: PBBFAC,,, | Performed by: INTERNAL MEDICINE

## 2018-07-19 NOTE — PROGRESS NOTES
Subjective:   Patient ID:  Carmela Maria is a 89 y.o. female who presents for cardiac consult of Hospital Follow Up; Shortness of Breath; and Aortic Stenosis      HPI  The patient came in today for cardiac consult of Hospital Follow Up; Shortness of Breath; and Aortic Stenosis    This is a 89 year old female pt with Severe AS - mean gradient 98 mmHg, diastolic HF, COPD, CML presents for follow up visit.     5/7/18 visit  11/2016 - Last seen by Dr. Jeffers, opted for conservative therapy for severe AS.   Pt has SOB, increased LE edema along with chest pain. SOB/CRYSTAL worsened over past 3 months. Has severe SOB with walking min distance. Had infusion for osteoporosis today. She still wants only conservative therapy for severe AS. She declined to go to [place] or [place] for TAVR a few years back and has been managing symptoms with medications. She has decreased urination lately with lasix 20mg, discussed will increase to 40mg BID for 3 days but has to be careful with BP as low BP with overdiuresis can cause her to have syncope or other events. LE edema worse as well has not been using compression stockings, wants a new script.     7/19/18  Pt was admitted 7/10-7/11 for ADHF was diuresed 1.5 L in hospital and discharged. She presented with SOB and mild epigastric abd pain and GI cocktail with IV lasix given in ED which improved/resolved symptoms.BNP was 831, with flat mildly positive troponins. I told pt and family that she will need home health CHF nursing and maybe home oxygen. She did get home health. Pt weighs 131 lbs at home, here 134 lbs. Pt feels fatigued, not breathing as well. Daughter-in-law present explained pt's  recently passed away. PT does not want hospice but will try AIM. She has been taking Lasix 40mg in AM.     Patient feels no palpitation, no dizziness, no syncope, no CNS symptoms.    Patient is compliant with medications.    2D ECHO 5/2018  CONCLUSIONS     1 - Severe left atrial  enlargement.     2 - Concentric hypertrophy.     3 - No wall motion abnormalities.     4 - Normal left ventricular systolic function (EF 55-60%).     5 - Impaired LV relaxation, elevated LAP (grade 2 diastolic dysfunction).     6 - Normal right ventricular systolic function .     7 - Pulmonary hypertension. The estimated PA systolic pressure is greater than 42 mmHg.     8 - Severe aortic valve stenosis (JAY 0.57 cm2, AVAi 0.35 cm2/m2, peak aortic jet velocity 5.7 m/s,MG 98 mmHg, ).     9 - Trivial mitral regurgitation.     10 - Trivial tricuspid regurgitation.     11 - Trivial pulmonic regurgitation.     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 Unruly Ball    CHF (congestive heart failure)     Clotting disorder     COPD (chronic obstructive pulmonary disease)     Coronary artery disease     CVA (cerebral infarction) 2013    Depression     Fall 2013    Heart murmur     Hyperlipidemia     Osteoporosis 2017    Neck of femur (T score -2.5)    Palpitation 2015    Sleep apnea     Traumatic brain injury 2013    Ulcer        Past Surgical History:   Procedure Laterality Date    APPENDECTOMY       SECTION      TUBAL LIGATION         Social History   Substance Use Topics    Smoking status: Never Smoker    Smokeless tobacco: Never Used    Alcohol use No       Family History   Problem Relation Age of Onset    COPD Mother     Heart disease Father     Cancer Sister     COPD Sister     COPD Brother        Patient's Medications   New Prescriptions    No medications on file   Previous Medications    ALBUTEROL 90 MCG/ACTUATION INHALER    Inhale 2 puffs into the lungs every 6 (six) hours.     ALBUTEROL-IPRATROPIUM (DUO-NEB) 2.5 MG-0.5 MG/3 ML NEBULIZER SOLUTION    USE 1 VIAL VIA NEBULIZER EVERY 6 HOURS WHILE AWAKE    BUDESONIDE (PULMICORT) 0.5 MG/2 ML NEBULIZER SOLUTION    Take 2 mLs (0.5 mg total) by nebulization 2 (two) times daily.    DASATINIB (SPRYCEL) 50 MG TABLET    Take 1 tablet (50 mg total) by mouth every other day.    FUROSEMIDE (LASIX) 20 MG TABLET    Take 1 tablet (20 mg total) by mouth 2 (two) times daily.    POLYETHYLENE GLYCOL (GLYCOLAX) 17 GRAM PWPK    Take 17 g by mouth once daily. Hold for diarrhea    RANEXA 500 MG TB12    TAKE 1 TABLET TWICE A DAY    SENNA-DOCUSATE 8.6-50 MG (PERICOLACE) 8.6-50 MG PER TABLET    Take 2 tablets by mouth 2 (two) times daily as needed for Constipation.   Modified Medications    No medications on file   Discontinued Medications    CHOLECALCIFEROL, VITAMIN D3, (VITAMIN D3) 400 UNIT CHEW    Take by mouth. Take  tues - thurs - sat    CICLOPIROX (PENLAC) 8 % SOLN    Apply to affected toenails at night time DAILY. On 7th day, file nails down, clean all nails with alcohol and restart application process.       Review of Systems   Constitutional: Negative.    HENT: Negative.    Eyes: Negative.    Respiratory: Positive for shortness of breath.    Cardiovascular: Positive for chest pain and leg swelling. Negative for palpitations.   Gastrointestinal: Negative.    Genitourinary: Negative.    Musculoskeletal: Negative.    Skin: Negative.    Neurological: Negative.    Endo/Heme/Allergies: Negative.    Psychiatric/Behavioral: Negative.    All 12 systems otherwise negative.      Wt Readings from Last 3 Encounters:   07/19/18 60.8 kg (134 lb 0.6 oz)   07/11/18 59.8 kg (131 lb 13.4 oz)   06/28/18 61.1 kg (134 lb 11.2 oz)     Temp Readings from Last 3 Encounters:   07/11/18 96.7 °F (35.9 °C) (Oral)   06/28/18 98 °F (36.7 °C) (Tympanic)   06/04/18 97.5 °F (36.4 °C) (Tympanic)     BP Readings from Last 3 Encounters:   07/19/18 122/78   07/11/18 (!) 101/49   06/28/18 (!)  "116/58     Pulse Readings from Last 3 Encounters:   07/19/18 70   07/11/18 (!) 56   06/28/18 88       /78   Pulse 70   Ht 5' 3" (1.6 m)   Wt 60.8 kg (134 lb 0.6 oz)   BMI 23.74 kg/m²     Objective:   Physical Exam   Constitutional: She is oriented to person, place, and time. She appears well-developed and well-nourished. No distress.   HENT:   Head: Normocephalic and atraumatic.   Nose: Nose normal.   Mouth/Throat: Oropharynx is clear and moist.   Eyes: Conjunctivae and EOM are normal. No scleral icterus.   Neck: Normal range of motion. Neck supple. JVD present. No thyromegaly present.   Cardiovascular: Normal rate, regular rhythm, S1 normal and S2 normal.  Exam reveals no gallop, no S3, no S4 and no friction rub.    Murmur heard.   Harsh midsystolic murmur is present with a grade of 4/6  at the upper right sternal border radiating to the neck  Pulmonary/Chest: Effort normal. No stridor. No respiratory distress. She has no wheezes. She has rales. She exhibits no tenderness.   Abdominal: Soft. Bowel sounds are normal. She exhibits no distension and no mass. There is no tenderness. There is no rebound.   Genitourinary:   Genitourinary Comments: Deferred   Musculoskeletal: Normal range of motion. She exhibits edema (trace). She exhibits no tenderness or deformity.   Lymphadenopathy:     She has no cervical adenopathy.   Neurological: She is alert and oriented to person, place, and time. She exhibits normal muscle tone. Coordination normal.   Skin: Skin is warm and dry. No rash noted. She is not diaphoretic. No erythema. No pallor.   Psychiatric: She has a normal mood and affect. Her behavior is normal. Judgment and thought content normal.   Nursing note and vitals reviewed.      Lab Results   Component Value Date     07/11/2018    K 4.8 07/11/2018    CL 97 07/11/2018    CO2 34 (H) 07/11/2018    BUN 21 07/11/2018    CREATININE 0.8 07/11/2018    GLU 86 07/11/2018    MG 1.9 07/10/2018    AST 20 07/11/2018 "    ALT 15 07/11/2018    ALBUMIN 3.1 (L) 07/11/2018    PROT 5.9 (L) 07/11/2018    BILITOT 0.5 07/11/2018    WBC 7.13 07/11/2018    HGB 10.9 (L) 07/11/2018    HCT 33.9 (L) 07/11/2018     (H) 07/11/2018     (L) 07/11/2018    INR 1.0 08/19/2017    TSH 1.771 12/08/2016    CHOL 169 11/10/2015    HDL 55 11/10/2015    LDLCALC 90.2 11/10/2015    TRIG 119 11/10/2015     (H) 07/10/2018     Assessment:      1. Acute on chronic diastolic heart failure    2. Severe aortic stenosis    3. Elevated troponin    4. Chronic bronchitis, unspecified chronic bronchitis type        Plan:   1. Acute on chronic diastolic HF, recently discharged  - cont lasix and increase PRN, 40mg daily and told to take an extra 40 mg for swelling x 2-3 days  - low salt diet  - low salt diet    2. Severe AS, mean gradient 98 mmHg  - pt wanted conservative therapy in past and continues to do so now  - discussed to avoid hypotension but eventually pt will have more HF symptoms   - discussed poor prognosis without TAVR, pt does wish to proceed with any workup  - referred to [facility]IM program    3. Edema  - compression stockings  - elevate legs  - cont tx as above      Thank you for allowing me to participate in this patient's care. Please do not hesitate to contact me with any questions or concerns. Consult note has been forwarded to the referral physician.

## 2018-07-24 ENCOUNTER — TELEPHONE (OUTPATIENT)
Dept: GASTROENTEROLOGY | Facility: CLINIC | Age: 83
End: 2018-07-24

## 2018-07-24 NOTE — TELEPHONE ENCOUNTER
----- Message from Yolette Holloway sent at 7/23/2018  5:51 PM CDT -----  Contact: Patient Portal Request  Hello, I am forwarding the original message from the patient portal.  Please call pt to schedule.  Thanks!     Original Message        ----- Message -----     From: Carmela Maria     Sent: 7/2/2018 11:21 AM CDT       To: Patient Appointment Schedule Request Mailing List  Subject: RE: Appointment Request    That will not work   We are one hour away   She does not get up that early   She also needs something sooner   She is very uncomfortable   ----- Message -----  From: Yolette  Sent: 6/30/2018 11:02 AM CDT  To: Carmela Maria  Subject: RE: Appointment Request  Hello.  I have scheduled your appointment for July 18 at 9:15am at the [facility] location.  Please let me know if this is convenient for you.       ----- Message -----     From: Carmela Maria     Sent: 6/29/2018 10:28 AM CDT       To: Patient Appointment Schedule Request Mailing List  Subject: Appointment Request    Appointment Request From: Carmela Maria    With Provider: Other - (see comments)    Would Accept With:Only the person I've selected    Preferred Date Range: From 6/29/2018 To 7/6/2018    Preferred Times: Any    Reason for visit: Request an Appt    Comments:  Dr Jones wants me to see a Gastroenterologist  for the stomach pain that I saw him for in  March and again 6/28/18

## 2018-07-27 ENCOUNTER — TELEPHONE (OUTPATIENT)
Dept: CARDIOLOGY | Facility: CLINIC | Age: 83
End: 2018-07-27

## 2018-07-27 NOTE — TELEPHONE ENCOUNTER
Ok that's good thanks.    The patients son has been notified of this information and all questions answered.

## 2018-07-27 NOTE — TELEPHONE ENCOUNTER
Son states pt lost 4lbs since yesterday. Pt has small crackles in lungs. No other symptoms    [phone]

## 2018-07-30 ENCOUNTER — TELEPHONE (OUTPATIENT)
Dept: CARDIOLOGY | Facility: CLINIC | Age: 83
End: 2018-07-30

## 2018-07-30 NOTE — TELEPHONE ENCOUNTER
Returned call to patient's son Ede--gave orders for taking Lasix--patient to take Lasix 20 mg tablet one tablet by mouth daily--increase to Lasix 20 mg tablet one tablet by mouth twice a day if 3-4 lb weight gain in a day--Ede repeated back correctly

## 2018-07-30 NOTE — TELEPHONE ENCOUNTER
----- Message from Margarita Ferrell sent at 7/30/2018  8:42 AM CDT -----  Contact: Pt Son/Ede  Caller request a call from the nurse because from 7-12-18 until today 7-30-18 pt has lost 10 pounds and caller wants to know if pt should reduce the amount of Lasix she takes, please contact the caller at [phone] or [phone]

## 2018-07-31 ENCOUNTER — OFFICE VISIT (OUTPATIENT)
Dept: INTERNAL MEDICINE | Facility: CLINIC | Age: 83
End: 2018-07-31
Payer: MEDICARE

## 2018-07-31 VITALS
WEIGHT: 129.44 LBS | HEART RATE: 77 BPM | SYSTOLIC BLOOD PRESSURE: 100 MMHG | BODY MASS INDEX: 22.92 KG/M2 | DIASTOLIC BLOOD PRESSURE: 50 MMHG | TEMPERATURE: 97 F

## 2018-07-31 DIAGNOSIS — C92.10 CML (CHRONIC MYELOCYTIC LEUKEMIA): ICD-10-CM

## 2018-07-31 DIAGNOSIS — I35.0 SEVERE AORTIC STENOSIS: ICD-10-CM

## 2018-07-31 DIAGNOSIS — I50.33 ACUTE ON CHRONIC DIASTOLIC HEART FAILURE: Primary | ICD-10-CM

## 2018-07-31 PROCEDURE — 99213 OFFICE O/P EST LOW 20 MIN: CPT | Mod: PBBFAC,PO | Performed by: FAMILY MEDICINE

## 2018-07-31 PROCEDURE — 99999 PR PBB SHADOW E&M-EST. PATIENT-LVL III: CPT | Mod: PBBFAC,,, | Performed by: FAMILY MEDICINE

## 2018-07-31 PROCEDURE — 99214 OFFICE O/P EST MOD 30 MIN: CPT | Mod: S$PBB,,, | Performed by: FAMILY MEDICINE

## 2018-07-31 RX ORDER — IBUPROFEN/PSEUDOEPHEDRINE HCL 200MG-30MG
TABLET ORAL
COMMUNITY

## 2018-07-31 RX ORDER — SIMETHICONE 125 MG
125 TABLET,CHEWABLE ORAL EVERY 6 HOURS PRN
COMMUNITY

## 2018-07-31 NOTE — PROGRESS NOTES
Subjective:       Patient ID: Carmela Maria is a 89 y.o. female.    Chief Complaint: Hospital Follow Up    89 yo female here with her daughter-in-law for follow up of hospitalization for decompensated heart failure. She is followed by Dr. Jeffers whose partner recently suggested the AIM program as a transition option to hospice. Her son is her primary caregiver and lives with her (he and his wife have a home next door). According to his wife, he is not accepting of the idea of hospice but is willing to enroll her in AIM. She currently has home health through [employer]. She is on lasix with recent changes in dosing based on weight.        does not have any pertinent problems on file.  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 Unruly Ball    CHF (congestive heart failure)     Clotting disorder     COPD (chronic obstructive pulmonary disease)     Coronary artery disease     CVA (cerebral infarction) 2013    Depression     Fall 2013    Heart murmur     Hyperlipidemia     Osteoporosis 2017    Neck of femur (T score -2.5)    Palpitation 2015    Sleep apnea     Traumatic brain injury 2013    Ulcer      Past Surgical History:   Procedure Laterality Date    APPENDECTOMY       SECTION      TUBAL LIGATION       Family History   Problem Relation Age of Onset    COPD Mother     Heart disease Father     Cancer Sister     COPD Sister     COPD Brother      Social History     Social History    Marital status:      Spouse name: N/A    Number of children: N/A    Years of education: N/A     Occupational History    Not on file.     Social History Main Topics    Smoking status: Never Smoker    Smokeless tobacco: Never Used    Alcohol use No    Drug use: No    Sexual  activity: No     Other Topics Concern    Not on file     Social History Narrative    Son stays with her, Surrogate decision maker. Her  of 74 years  18. She is DNR. Son and patient NOT ready for Hospice talk.      Review of Systems   Constitutional: Positive for activity change. Negative for unexpected weight change.   HENT: Positive for rhinorrhea. Negative for hearing loss and trouble swallowing.    Eyes: Negative for discharge and visual disturbance.   Respiratory: Positive for chest tightness. Negative for wheezing.    Cardiovascular: Positive for chest pain. Negative for palpitations.   Gastrointestinal: Positive for constipation and diarrhea. Negative for blood in stool and vomiting.   Endocrine: Positive for polyuria. Negative for polydipsia.   Genitourinary: Negative for difficulty urinating, dysuria, hematuria and menstrual problem.   Musculoskeletal: Negative for arthralgias, joint swelling and neck pain.   Neurological: Positive for weakness. Negative for headaches.   Psychiatric/Behavioral: Positive for confusion and dysphoric mood.       Objective:     Vitals:    18 1304   BP: (!) 100/50   Pulse: 77   Temp: 97.3 °F (36.3 °C)        Physical Exam    Assessment:       1. Acute on chronic diastolic heart failure    2. CML (chronic myelocytic leukemia)    3. Severe aortic stenosis        Plan:           Problem List Items Addressed This Visit     Severe aortic stenosis    CML (chronic myelocytic leukemia)    Overview     Dr Ball in [place] follows.         Acute on chronic diastolic heart failure - Primary      Referral to home health [facility]IM program. Counseled patient and daughter in law on the differences in home health, AIM, and hospice. Recommend hospice for increased benefits/access to resources. Total face to face time spent was 30 minutes with >50 % spent counseling regarding diagnosis, risks and benefits of various levels of home based care.

## 2018-08-06 ENCOUNTER — PATIENT MESSAGE (OUTPATIENT)
Dept: INTERNAL MEDICINE | Facility: CLINIC | Age: 83
End: 2018-08-06

## 2018-08-07 ENCOUNTER — TELEPHONE (OUTPATIENT)
Dept: ADMINISTRATIVE | Facility: CLINIC | Age: 83
End: 2018-08-07

## 2018-08-07 NOTE — TELEPHONE ENCOUNTER
Home Health SOC 07/12/2018 to 09/09/2018 with [facility], Dr Aden Jones. SN, A services.

## 2018-08-14 ENCOUNTER — TELEPHONE (OUTPATIENT)
Dept: CARDIOLOGY | Facility: CLINIC | Age: 83
End: 2018-08-14

## 2018-08-14 ENCOUNTER — TELEPHONE (OUTPATIENT)
Dept: INTERNAL MEDICINE | Facility: CLINIC | Age: 83
End: 2018-08-14

## 2018-08-14 NOTE — TELEPHONE ENCOUNTER
----- Message from Melody Walker sent at 8/14/2018  8:53 AM CDT -----  Contact: Ede - Son  Request a call to change the pt home healthcare to [facility], the pt son can be reached at [phone]///[id number]

## 2018-08-14 NOTE — TELEPHONE ENCOUNTER
I need a stat order for her to be admitted to the [facility] program. /srb As well as she has to be d/c from current home health company

## 2018-09-05 ENCOUNTER — TELEPHONE (OUTPATIENT)
Dept: CARDIOLOGY | Facility: CLINIC | Age: 83
End: 2018-09-05

## 2018-09-05 NOTE — TELEPHONE ENCOUNTER
Spoke with patient son Faustino, yesterday he forgot to give the patient her lasix and would like to know if he should start giving her 2 today? He has been giving her 1 lasix daily. Her weight was 127.8 lbon 9/4 , 129 lb and about 25 minutes ago was at 130.6lb . Patient is not having difficulty breathing or sleeping .

## 2018-09-05 NOTE — TELEPHONE ENCOUNTER
Spoke with patient son and informed him to give patient lasix 2 BID for two days and then go back to once daily.  said he will give her the second pill now and continue to monitor her weight.

## 2018-10-01 ENCOUNTER — TELEPHONE (OUTPATIENT)
Dept: ADMINISTRATIVE | Facility: CLINIC | Age: 83
End: 2018-10-01

## 2018-10-01 NOTE — TELEPHONE ENCOUNTER
Home Health SOC 08/17/2018 - 10/15/2018 with [facility] ([facility]) - Dr. Deborah Paredes. SN services.

## 2018-10-12 ENCOUNTER — TELEPHONE (OUTPATIENT)
Dept: INTERNAL MEDICINE | Facility: CLINIC | Age: 83
End: 2018-10-12

## 2018-10-12 NOTE — TELEPHONE ENCOUNTER
MD LOURDES Guerrier Staff   Caller: Unspecified (Today, 10:17 AM)             Yes, please call back with order of home oxygen 2 liters per minute nasal cannula via home health company.

## 2018-10-12 NOTE — TELEPHONE ENCOUNTER
S/w Amanda, states she called Dr Jones and got an order for O2 and she is trying to get it approved. States the pt and the son are opposed to admitting her to Hospice.

## 2018-10-12 NOTE — TELEPHONE ENCOUNTER
Amanda states pt's O2 level is 78-81, she has leukemia, COPD, per Amanda pt is not is respiratory distress, pt also refuses Hospice. Son states his wife is not home and she would have to take her to ER, advised to call 911, son states wife will have to make that decision. Informed Amanda, Dr Paredes is not in clinic this am but I will send message to  on call.

## 2018-10-12 NOTE — TELEPHONE ENCOUNTER
----- Message from Silvia Albarran sent at 10/12/2018  9:52 AM CDT -----  Contact: Amanda- Home Health  Amanda states pt's oxygen level is 78 then 81 and pt doesn't have oxygen at home. Please call Amanda back at [phone].        Thanks,   Silvia Albarran

## 2018-10-14 ENCOUNTER — HOSPITAL ENCOUNTER (INPATIENT)
Facility: HOSPITAL | Age: 83
LOS: 2 days | Discharge: HOSPICE | DRG: 291 | End: 2018-10-16
Attending: EMERGENCY MEDICINE | Admitting: FAMILY MEDICINE
Payer: MEDICARE

## 2018-10-14 DIAGNOSIS — R06.02 SHORTNESS OF BREATH: ICD-10-CM

## 2018-10-14 DIAGNOSIS — R09.02 HYPOXIA: ICD-10-CM

## 2018-10-14 DIAGNOSIS — R60.9 EDEMA, UNSPECIFIED TYPE: ICD-10-CM

## 2018-10-14 DIAGNOSIS — J18.9 PNEUMONIA OF LEFT LOWER LOBE DUE TO INFECTIOUS ORGANISM: ICD-10-CM

## 2018-10-14 DIAGNOSIS — I35.0 CRITICAL AORTIC VALVE STENOSIS: ICD-10-CM

## 2018-10-14 DIAGNOSIS — R06.02 SOB (SHORTNESS OF BREATH): ICD-10-CM

## 2018-10-14 DIAGNOSIS — I50.9 CONGESTIVE HEART FAILURE, UNSPECIFIED HF CHRONICITY, UNSPECIFIED HEART FAILURE TYPE: Primary | ICD-10-CM

## 2018-10-14 PROBLEM — J96.21 ACUTE ON CHRONIC RESPIRATORY FAILURE WITH HYPOXIA: Status: ACTIVE | Noted: 2018-10-14

## 2018-10-14 LAB
ALBUMIN SERPL BCP-MCNC: 3.5 G/DL
ALLENS TEST: ABNORMAL
ALP SERPL-CCNC: 65 U/L
ALT SERPL W/O P-5'-P-CCNC: 12 U/L
ANION GAP SERPL CALC-SCNC: 11 MMOL/L
AST SERPL-CCNC: 25 U/L
BASOPHILS # BLD AUTO: 0.02 K/UL
BASOPHILS NFR BLD: 0.2 %
BILIRUB SERPL-MCNC: 0.7 MG/DL
BILIRUB UR QL STRIP: NEGATIVE
BNP SERPL-MCNC: 1788 PG/ML
BUN SERPL-MCNC: 18 MG/DL
CALCIUM SERPL-MCNC: 9.1 MG/DL
CHLORIDE SERPL-SCNC: 95 MMOL/L
CLARITY UR: CLEAR
CO2 SERPL-SCNC: 30 MMOL/L
COLOR UR: YELLOW
CREAT SERPL-MCNC: 0.8 MG/DL
DELSYS: ABNORMAL
DIFFERENTIAL METHOD: ABNORMAL
EOSINOPHIL # BLD AUTO: 0 K/UL
EOSINOPHIL NFR BLD: 0.3 %
ERYTHROCYTE [DISTWIDTH] IN BLOOD BY AUTOMATED COUNT: 15.8 %
ERYTHROCYTE [SEDIMENTATION RATE] IN BLOOD BY WESTERGREN METHOD: 20 MM/H
EST. GFR  (AFRICAN AMERICAN): >60 ML/MIN/1.73 M^2
EST. GFR  (NON AFRICAN AMERICAN): >60 ML/MIN/1.73 M^2
FIO2: 36
FIO2: 50
FLOW: 4
GLUCOSE SERPL-MCNC: 134 MG/DL
GLUCOSE UR QL STRIP: NEGATIVE
HCO3 UR-SCNC: 34.6 MMOL/L (ref 24–28)
HCO3 UR-SCNC: 37.6 MMOL/L (ref 24–28)
HCO3 UR-SCNC: 40 MMOL/L (ref 24–28)
HCT VFR BLD AUTO: 36 %
HGB BLD-MCNC: 11.6 G/DL
HGB UR QL STRIP: ABNORMAL
INR PPP: 1
KETONES UR QL STRIP: NEGATIVE
LACTATE SERPL-SCNC: 0.6 MMOL/L
LEUKOCYTE ESTERASE UR QL STRIP: NEGATIVE
LYMPHOCYTES # BLD AUTO: 1.1 K/UL
LYMPHOCYTES NFR BLD: 10.8 %
MCH RBC QN AUTO: 31.8 PG
MCHC RBC AUTO-ENTMCNC: 32.2 G/DL
MCV RBC AUTO: 99 FL
MODE: ABNORMAL
MONOCYTES # BLD AUTO: 0.7 K/UL
MONOCYTES NFR BLD: 7.3 %
NEUTROPHILS # BLD AUTO: 7.9 K/UL
NEUTROPHILS NFR BLD: 81.4 %
NITRITE UR QL STRIP: NEGATIVE
PCO2 BLDA: 57 MMHG (ref 35–45)
PCO2 BLDA: 61.6 MMHG (ref 35–45)
PCO2 BLDA: 70.8 MMHG (ref 35–45)
PH SMN: 7.33 [PH] (ref 7.35–7.45)
PH SMN: 7.39 [PH] (ref 7.35–7.45)
PH SMN: 7.42 [PH] (ref 7.35–7.45)
PH UR STRIP: 5 [PH] (ref 5–8)
PLATELET # BLD AUTO: 148 K/UL
PMV BLD AUTO: 12.8 FL
PO2 BLDA: 33 MMHG (ref 80–100)
PO2 BLDA: 64 MMHG (ref 80–100)
PO2 BLDA: 72 MMHG (ref 80–100)
POC BE: 10 MMOL/L
POC BE: 12 MMOL/L
POC BE: 15 MMOL/L
POC SATURATED O2: 62 % (ref 95–100)
POC SATURATED O2: 92 % (ref 95–100)
POC SATURATED O2: 92 % (ref 95–100)
POTASSIUM SERPL-SCNC: 4.7 MMOL/L
PROCALCITONIN SERPL IA-MCNC: 0.04 NG/ML
PROT SERPL-MCNC: 6.9 G/DL
PROT UR QL STRIP: NEGATIVE
PROTHROMBIN TIME: 9.9 SEC
RBC # BLD AUTO: 3.65 M/UL
SAMPLE: ABNORMAL
SITE: ABNORMAL
SODIUM SERPL-SCNC: 136 MMOL/L
SP GR UR STRIP: 1.01 (ref 1–1.03)
TROPONIN I SERPL DL<=0.01 NG/ML-MCNC: 0.27 NG/ML
TROPONIN I SERPL DL<=0.01 NG/ML-MCNC: 0.38 NG/ML
TROPONIN I SERPL DL<=0.01 NG/ML-MCNC: 0.39 NG/ML
URN SPEC COLLECT METH UR: ABNORMAL
UROBILINOGEN UR STRIP-ACNC: NEGATIVE EU/DL
VT: 400
WBC # BLD AUTO: 9.69 K/UL

## 2018-10-14 PROCEDURE — 96376 TX/PRO/DX INJ SAME DRUG ADON: CPT

## 2018-10-14 PROCEDURE — 80053 COMPREHEN METABOLIC PANEL: CPT

## 2018-10-14 PROCEDURE — 85610 PROTHROMBIN TIME: CPT

## 2018-10-14 PROCEDURE — 85025 COMPLETE CBC W/AUTO DIFF WBC: CPT

## 2018-10-14 PROCEDURE — 99900035 HC TECH TIME PER 15 MIN (STAT)

## 2018-10-14 PROCEDURE — 81003 URINALYSIS AUTO W/O SCOPE: CPT

## 2018-10-14 PROCEDURE — 83605 ASSAY OF LACTIC ACID: CPT

## 2018-10-14 PROCEDURE — 36415 COLL VENOUS BLD VENIPUNCTURE: CPT

## 2018-10-14 PROCEDURE — 27000190 HC CPAP FULL FACE MASK W/VALVE

## 2018-10-14 PROCEDURE — 36600 WITHDRAWAL OF ARTERIAL BLOOD: CPT

## 2018-10-14 PROCEDURE — 84145 PROCALCITONIN (PCT): CPT

## 2018-10-14 PROCEDURE — 83880 ASSAY OF NATRIURETIC PEPTIDE: CPT

## 2018-10-14 PROCEDURE — 84484 ASSAY OF TROPONIN QUANT: CPT | Mod: 91

## 2018-10-14 PROCEDURE — 96365 THER/PROPH/DIAG IV INF INIT: CPT

## 2018-10-14 PROCEDURE — 93005 ELECTROCARDIOGRAM TRACING: CPT

## 2018-10-14 PROCEDURE — 93010 ELECTROCARDIOGRAM REPORT: CPT | Mod: ,,, | Performed by: INTERNAL MEDICINE

## 2018-10-14 PROCEDURE — S0073 INJECTION, AZTREONAM, 500 MG: HCPCS | Performed by: EMERGENCY MEDICINE

## 2018-10-14 PROCEDURE — 93306 TTE W/DOPPLER COMPLETE: CPT | Mod: 26,,, | Performed by: INTERNAL MEDICINE

## 2018-10-14 PROCEDURE — 27000221 HC OXYGEN, UP TO 24 HOURS

## 2018-10-14 PROCEDURE — 82803 BLOOD GASES ANY COMBINATION: CPT

## 2018-10-14 PROCEDURE — 96367 TX/PROPH/DG ADDL SEQ IV INF: CPT

## 2018-10-14 PROCEDURE — 93306 TTE W/DOPPLER COMPLETE: CPT

## 2018-10-14 PROCEDURE — 25000003 PHARM REV CODE 250: Performed by: EMERGENCY MEDICINE

## 2018-10-14 PROCEDURE — 63600175 PHARM REV CODE 636 W HCPCS: Performed by: EMERGENCY MEDICINE

## 2018-10-14 PROCEDURE — 94660 CPAP INITIATION&MGMT: CPT

## 2018-10-14 PROCEDURE — 94761 N-INVAS EAR/PLS OXIMETRY MLT: CPT

## 2018-10-14 PROCEDURE — 99285 EMERGENCY DEPT VISIT HI MDM: CPT | Mod: 25

## 2018-10-14 PROCEDURE — 87040 BLOOD CULTURE FOR BACTERIA: CPT | Mod: 59

## 2018-10-14 PROCEDURE — 21400001 HC TELEMETRY ROOM

## 2018-10-14 PROCEDURE — 96375 TX/PRO/DX INJ NEW DRUG ADDON: CPT

## 2018-10-14 PROCEDURE — 99223 1ST HOSP IP/OBS HIGH 75: CPT | Mod: 25,,, | Performed by: INTERNAL MEDICINE

## 2018-10-14 RX ORDER — FAMOTIDINE 20 MG/1
20 TABLET, FILM COATED ORAL 2 TIMES DAILY
Status: DISCONTINUED | OUTPATIENT
Start: 2018-10-14 | End: 2018-10-15

## 2018-10-14 RX ORDER — FUROSEMIDE 10 MG/ML
40 INJECTION INTRAMUSCULAR; INTRAVENOUS 2 TIMES DAILY
Status: DISCONTINUED | OUTPATIENT
Start: 2018-10-14 | End: 2018-10-14

## 2018-10-14 RX ORDER — ACETAMINOPHEN 325 MG/1
650 TABLET ORAL EVERY 8 HOURS PRN
Status: DISCONTINUED | OUTPATIENT
Start: 2018-10-14 | End: 2018-10-16 | Stop reason: HOSPADM

## 2018-10-14 RX ORDER — ONDANSETRON 2 MG/ML
4 INJECTION INTRAMUSCULAR; INTRAVENOUS EVERY 8 HOURS PRN
Status: DISCONTINUED | OUTPATIENT
Start: 2018-10-14 | End: 2018-10-16 | Stop reason: HOSPADM

## 2018-10-14 RX ORDER — FUROSEMIDE 10 MG/ML
40 INJECTION INTRAMUSCULAR; INTRAVENOUS
Status: COMPLETED | OUTPATIENT
Start: 2018-10-14 | End: 2018-10-14

## 2018-10-14 RX ORDER — ACETAMINOPHEN 500 MG
1 TABLET ORAL DAILY
COMMUNITY

## 2018-10-14 RX ORDER — VANCOMYCIN HCL IN 5 % DEXTROSE 1G/250ML
1000 PLASTIC BAG, INJECTION (ML) INTRAVENOUS ONCE
Status: DISCONTINUED | OUTPATIENT
Start: 2018-10-14 | End: 2018-10-14

## 2018-10-14 RX ORDER — IPRATROPIUM BROMIDE AND ALBUTEROL SULFATE 2.5; .5 MG/3ML; MG/3ML
3 SOLUTION RESPIRATORY (INHALATION) EVERY 4 HOURS PRN
Status: DISCONTINUED | OUTPATIENT
Start: 2018-10-14 | End: 2018-10-16

## 2018-10-14 RX ORDER — FUROSEMIDE 10 MG/ML
40 INJECTION INTRAMUSCULAR; INTRAVENOUS 2 TIMES DAILY
Status: DISCONTINUED | OUTPATIENT
Start: 2018-10-15 | End: 2018-10-15 | Stop reason: SDUPTHER

## 2018-10-14 RX ADMIN — FUROSEMIDE 40 MG: 10 INJECTION, SOLUTION INTRAMUSCULAR; INTRAVENOUS at 01:10

## 2018-10-14 RX ADMIN — NITROGLYCERIN 1 INCH: 20 OINTMENT TOPICAL at 10:10

## 2018-10-14 RX ADMIN — FUROSEMIDE 40 MG: 10 INJECTION, SOLUTION INTRAMUSCULAR; INTRAVENOUS at 10:10

## 2018-10-14 RX ADMIN — DEXTROSE 1000 MG: 50 INJECTION, SOLUTION INTRAVENOUS at 12:10

## 2018-10-14 RX ADMIN — FAMOTIDINE 20 MG: 20 TABLET ORAL at 08:10

## 2018-10-14 RX ADMIN — VANCOMYCIN HYDROCHLORIDE 1000 MG: 1 INJECTION, POWDER, LYOPHILIZED, FOR SOLUTION INTRAVENOUS at 01:10

## 2018-10-14 NOTE — PROGRESS NOTES
RT called Radhika NP then spoke to Bernadette TELLEZ regarding pt status, alert/oriented/unlabored/denies SOB. Pt to be placed back on bipap continuous at this time post abg results.

## 2018-10-14 NOTE — HPI
"Carmela Maria is a 89 y.o. female patient with PMHx of diastolic CHF, critical aortic stenosis, CML, COPD, who presents to the ER for evaluation of c/o shortness of breath. Symptoms are constant and moderate in severity. Son stated he has called EMS twice this week for sxs and he stated he took her O2 sats at home which were in the 70's. Son reports EMS gave her oxygen and sxs improved. Yesterday, she refused to come to the hospital. Son reports pt seemed more confused today. Son reports "supposedly" took her 20mg lasix this morning but is not sure. Pt denies any recent long distance traveling. Associated sxs included productive cough and leg swelling. Patient denies any fever, chills, lightheadedness, diaphoresis, CP, nausea, emesis, and all other sxs at this time. In ER, ABG's showed PO2 33 and was placed on bipap, Troponin 0.394, BNP 1788. Pt has a Living Will, but son wanted her to be a FULL CODE. Dr. Jeffers, Cardiology, was consulted and told the the son, the patient told him she didn't want life support. Son then agreed to DNR. Son was told she has critical AS and is terminal. After 60 mg IV Lasix, she was able to come off Bipap and on NC. Son, Ede Payne, is surrogate decision maker. She is admitted with acute on chronic diastolic heart failure with critical aortic stenosis to ICU on bipap.          "

## 2018-10-14 NOTE — ED NOTES
Pt and family at bedside request bipap removal and be placed back on NC, Dr Fleming and YUN Zavaleta NP at bedside and approve, respiratory therapy notified.

## 2018-10-14 NOTE — SUBJECTIVE & OBJECTIVE
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 Unruly Ball    CHF (congestive heart failure)     Clotting disorder     COPD (chronic obstructive pulmonary disease)     Coronary artery disease     CVA (cerebral infarction) 2013    Depression     Fall 2013    Heart murmur     Hyperlipidemia     Osteoporosis 2017    Neck of femur (T score -2.5)    Palpitation 2015    Sleep apnea     Traumatic brain injury 2013    Ulcer        Past Surgical History:   Procedure Laterality Date    APPENDECTOMY       SECTION      TUBAL LIGATION         Review of patient's allergies indicates:   Allergen Reactions    Asmanex twisthaler  [mometasone]      Other reaction(s): Rhinitis  Other reaction(s): Headache  Other reaction(s): Eye irritation    Aspirin      Other reaction(s): Unknown    Brovana  [arformoterol] Nausea Only     Other reaction(s): Headache  Other reaction(s): Dry Nose    Butisol  [butabarbital]      Other reaction(s): Unknown    Codeine      Other reaction(s): Unknown    Diazepam      Other reaction(s): Unknown    Iodine      Other reaction(s): Unknown    Limbitrol  [amitriptyline-chlordiazepoxide]      Other reaction(s): Unknown  Other reaction(s): Unknown    Medrol  [methylprednisolone]      Other reaction(s): Shortness of breath  Other reaction(s): Shortness of breath    Pcn [penicillins]     Sulfa (sulfonamide antibiotics)      Other reaction(s): Unknown       No current facility-administered medications on file prior to encounter.      Current Outpatient Medications on File Prior to Encounter   Medication Sig    albuterol 90 mcg/actuation inhaler Inhale 2 puffs into the lungs every 6 (six) hours.    albuterol-ipratropium (DUO-NEB) 2.5 mg-0.5 mg/3 mL nebulizer solution USE 1 VIAL  VIA NEBULIZER EVERY 6 HOURS WHILE AWAKE    budesonide (PULMICORT) 0.5 mg/2 mL nebulizer solution Take 2 mLs (0.5 mg total) by nebulization 2 (two) times daily.    cholecalciferol, vitamin D3, (VITAMIN D3) 2,000 unit Cap Take 1 capsule by mouth once daily.    dasatinib (SPRYCEL) 50 MG tablet Take 1 tablet (50 mg total) by mouth every other day.    furosemide (LASIX) 20 MG tablet Take 1 tablet (20 mg total) by mouth 2 (two) times daily. (Patient taking differently: Take 20 mg by mouth 2 (two) times daily. Pt takes two tablets once a day)    melatonin 3 mg TbDL Take by mouth. May be 5 mg, not sure    polyethylene glycol (GLYCOLAX) 17 gram PwPk Take 17 g by mouth once daily. Hold for diarrhea    RANEXA 500 mg Tb12 TAKE 1 TABLET TWICE A DAY    ranitidine (ZANTAC) 150 MG tablet Take 150 mg by mouth 2 (two) times daily.    senna-docusate 8.6-50 mg (PERICOLACE) 8.6-50 mg per tablet Take 2 tablets by mouth 2 (two) times daily as needed for Constipation.    simethicone (MYLICON) 125 MG chewable tablet Take 125 mg by mouth every 6 (six) hours as needed for Flatulence. Not sure of dose     Family History     Problem Relation (Age of Onset)    COPD Mother, Sister, Brother    Cancer Sister    Heart disease Father        Tobacco Use    Smoking status: Never Smoker    Smokeless tobacco: Never Used   Substance and Sexual Activity    Alcohol use: No    Drug use: No    Sexual activity: No     Review of Systems   Constitutional: Positive for fatigue and unexpected weight change. Negative for appetite change, chills and fever.   HENT: Negative.  Negative for congestion, ear discharge, facial swelling, sore throat and trouble swallowing.    Eyes: Negative.  Negative for photophobia, pain, discharge, redness and visual disturbance.   Respiratory: Positive for cough and shortness of breath. Negative for chest tightness, wheezing and stridor.    Cardiovascular: Positive for leg swelling. Negative for chest pain and  palpitations.   Gastrointestinal: Negative for abdominal distention, abdominal pain, anal bleeding, blood in stool, constipation, diarrhea, nausea, rectal pain and vomiting.   Endocrine: Negative.  Negative for cold intolerance, heat intolerance, polydipsia, polyphagia and polyuria.   Genitourinary: Negative.  Negative for difficulty urinating, dysuria, flank pain, frequency, hematuria, pelvic pain, urgency, vaginal bleeding and vaginal discharge.   Musculoskeletal: Negative.  Negative for arthralgias, back pain, gait problem, joint swelling, myalgias and neck pain.   Skin: Positive for pallor. Negative for color change, rash and wound.   Allergic/Immunologic: Negative.  Negative for food allergies and immunocompromised state.   Neurological: Positive for weakness. Negative for dizziness, tremors, seizures, syncope, facial asymmetry, speech difficulty, light-headedness, numbness and headaches.   Hematological: Negative.  Negative for adenopathy. Does not bruise/bleed easily.   Psychiatric/Behavioral: Positive for agitation. Negative for confusion, hallucinations, sleep disturbance and suicidal ideas. The patient is not nervous/anxious.    All other systems reviewed and are negative.    Objective:     Vital Signs (Most Recent):  Temp: 97.9 °F (36.6 °C) (10/14/18 0952)  Pulse: 63 (10/14/18 1400)  Resp: (!) 23 (10/14/18 1400)  BP: (!) 122/53 (10/14/18 1400)  SpO2: (!) 86 % (10/14/18 1400) Vital Signs (24h Range):  Temp:  [97.9 °F (36.6 °C)] 97.9 °F (36.6 °C)  Pulse:  [63-90] 63  Resp:  [15-31] 23  SpO2:  [68 %-100 %] 86 %  BP: (107-130)/(53-66) 122/53     Weight: 60.7 kg (133 lb 14.4 oz)  Body mass index is 22.98 kg/m².    Physical Exam   Constitutional: She is oriented to person, place, and time. She appears well-developed and well-nourished. No distress.   HENT:   Head: Normocephalic and atraumatic.   Nose: Nose normal.   Eyes: Conjunctivae and EOM are normal. Pupils are equal, round, and reactive to light. Right eye  exhibits no discharge. Left eye exhibits no discharge.   Neck: Normal range of motion. Neck supple. No JVD present. No tracheal deviation present. No thyromegaly present.   Cardiovascular: Normal rate and regular rhythm. Exam reveals no gallop and no friction rub.   No murmur heard.  2/6 EDUARDO   Pulmonary/Chest: No stridor. She is in respiratory distress. She has no wheezes. She has rales. She exhibits no tenderness.   Abdominal: Soft. Bowel sounds are normal. She exhibits no distension and no mass. There is no tenderness. There is no guarding.   Musculoskeletal: Normal range of motion. She exhibits edema. She exhibits no tenderness or deformity.   Lymphadenopathy:     She has no cervical adenopathy.   Neurological: She is alert and oriented to person, place, and time. She has normal reflexes. No cranial nerve deficit. Coordination normal.   Skin: Skin is warm and dry. No rash noted. She is not diaphoretic. No erythema. No pallor.   Nursing note and vitals reviewed.        CRANIAL NERVES     CN III, IV, VI   Pupils are equal, round, and reactive to light.  Extraocular motions are normal.      Significant Labs:   CBC:   Recent Labs   Lab  10/14/18   1003   WBC  9.69   HGB  11.6*   HCT  36.0*   PLT  148*     CMP:   Recent Labs   Lab  10/14/18   1003   NA  136   K  4.7   CL  95   CO2  30*   GLU  134*   BUN  18   CREATININE  0.8   CALCIUM  9.1   PROT  6.9   ALBUMIN  3.5   BILITOT  0.7   ALKPHOS  65   AST  25   ALT  12   ANIONGAP  11   EGFRNONAA  >60     Cardiac Markers:   Recent Labs   Lab  10/14/18   1003   BNP  1,788*     Lactic Acid:   Recent Labs   Lab  10/14/18   1210   LACTATE  0.6       Significant Imaging: I have reviewed all pertinent imaging results/findings within the past 24 hours.   Imaging Results          X-Ray Chest AP Portable (Final result)  Result time 10/14/18 10:35:17    Final result by Stu Calderon MD (10/14/18 10:35:17)                 Impression:      Findings suggest some developing  consolidation and atelectasis in the left lower lobe.      Electronically signed by: Nelson Amador MD  Date:    10/14/2018  Time:    10:35             Narrative:    EXAMINATION:  XR CHEST AP PORTABLE    CLINICAL HISTORY:  CHF;    TECHNIQUE:  Single frontal view of the chest was performed.    COMPARISON:  07/10/2018    FINDINGS:  There is mild cardiomegaly.  There is a chronic pattern of coarsened reticulonodular interstitial markings within the lungs ting chronic changes of interstitial disease.  There is more localized patchy area of consolidation in the lung base suggesting developing consolidation or atelectasis.

## 2018-10-14 NOTE — ASSESSMENT & PLAN NOTE
Acute on chronic diastolic CHF  -IV lasix 40mg BID  -cardiac diet with 1500 milliliter(s) fluid restriction  -daily weights  -strict I &O  -No BB due to hypotension  - No ACE due to hypotension  - Cardiology consult: Dr. Jeffers spoke with Patient and son  -Chest Xray showed possible Pneumonia: nml WBC, afebrile, procalcitonin nml - No Abx at this time.

## 2018-10-14 NOTE — PROGRESS NOTES
"Patient seen and examined. ER Nurse called to report patient's B/P dropped to the 80's. She currently has BiPap and is asking it to be taken off. Daughter-in-law states her worsening condition is extremely hard on the Son, Ede Maria. He has had 3 family deaths this year: 4/2018 Uncle, 5/2018 sudden death of his Father due to MVA, 9/2018 death of cousin. D-in-law states Ede stated he didn't want her to die at her home "because he could never enter the house again." D-in-law told she may not live through the night. Patient will be placed on NCannula per her wishes (D-in-law heard her say this) and admitted to Telemetry. Son was previously not agreeable to Hospice and unable to decide to send her to inpatient Hospice tonight. She is DNR. She has been downgraded from ICU to telemetry for comfort care. ppt  "

## 2018-10-14 NOTE — ASSESSMENT & PLAN NOTE
SYMPTOMATIOC WITH RECURRENT EXACERBATION WITH ACUTE DIASTOLIC CHF .WANTING CONSERVATIVE THERAPY REFUSED TAVR AND IS DNR.
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
## 2018-10-14 NOTE — PROGRESS NOTES
"Pt taken off of avaps and placed on nc per MD. Pt alert and oriented to person and place. Pt does appear to be slightly confused to what things are around her. ( ex. Attempting to put finger pulse ox probe in mouth) daughter at bedside states she " sometimes does this at home"   "

## 2018-10-14 NOTE — PROGRESS NOTES
Pharmacist Renal Dose Adjustment Note    Carmela Maria is a 89 y.o. female being treated with the medication aztreonam.     Patient Data:  Vital Signs (Most Recent):  Temp: 97.9 °F (36.6 °C) (10/14/18 0952)  Pulse: 74 (10/14/18 1200)  Resp: 20 (10/14/18 1200)  BP: (!) 117/59 (10/14/18 1200)  SpO2: 100 % (10/14/18 1200)   Vital Signs (72h Range):  Temp:  [97.9 °F (36.6 °C)]   Pulse:  [72-90]   Resp:  [15-31]   BP: (114-130)/(56-66)   SpO2:  [68 %-100 %]      Recent Labs   Lab  10/14/18   1003   CREATININE  0.8     Serum creatinine: 0.8 mg/dL 10/14/18 1003  Estimated creatinine clearance: 38.7 mL/min    Medication dose will be changed aztreonam 1000 mg IV every 8 hours.     Pharmacist's Name: Daylin Rucker  Pharmacist's Extension: [phone]
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
## 2018-10-14 NOTE — ED NOTES
Pt resting quietly asleep, NAD noted, VSS, respirations even/unlabored, family at bedside, call light in reach, bed low, SR x 2.

## 2018-10-14 NOTE — ASSESSMENT & PLAN NOTE
-Cardiology consulted: Dr. Jeffers spoke with son and patient  -DNR  -Patient refused surgery, not a surgical candidate

## 2018-10-14 NOTE — H&P
"[facility] Medicine  History & Physical    Patient Name: Carmela Maria  MRN: [id number]  Admission Date: 10/14/2018  Attending Physician: Dr. Anyi Fleming  Primary Care Provider: Aden Jones MD    Patient information was obtained from patient, spouse/SO, past medical records and ER records.     Subjective:     Principal Problem:Congestive heart failure    Chief Complaint:   Chief Complaint   Patient presents with    Shortness of Breath     Pt family states, Her oxygen is low."        HPI: Carmela Maria is a 89 y.o. female patient with PMHx of diastolic CHF, critical aortic stenosis, CML, COPD, who presents to the ER for evaluation of  c/o shortness of breath. Symptoms are constant and moderate in severity. Son stated he has called EMS twice this week for sxs and he stated he took her O2 sats at home which were in the 70's. Son reports EMS gave her oxygen and sxs improved. Yesterday, she refused to come to the hospital. Son reports pt seemed more confused today. Son reports "supposedly" took her 20mg lasix this morning but is not sure. Pt denies any recent long distance traveling. Associated sxs included productive cough and leg swelling. Patient denies any fever, chills, lightheadedness, diaphoresis, CP, nausea, emesis, and all other sxs at this time. In ER, ABG's showed PO2 33 and was placed on bipap, Troponin 0.394, BNP 1788. Pt has a Living Will, but son wanted her to be a FULL CODE. Dr. Jeffers, Cardiology, was consulted and told the the son, the patient told him she didn't want life support. Son then agreed to DNR. Son was told she has critical AS and is terminal. After 60 mg IV Lasix, she was able to come off Bipap and on NC. Son, Ede Payne, is surrogate decision maker. She is admitted to ICU with acute on chronic diastolic heart failure with critical aortic stenosis on Bipap.            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 Unruly Ball    CHF (congestive heart failure)     Clotting disorder     COPD (chronic obstructive pulmonary disease)     Coronary artery disease     CVA (cerebral infarction) 2013    Depression     Fall 2013    Heart murmur     Hyperlipidemia     Osteoporosis 2017    Neck of femur (T score -2.5)    Palpitation 2015    Sleep apnea     Traumatic brain injury 2013    Ulcer        Past Surgical History:   Procedure Laterality Date    APPENDECTOMY       SECTION      TUBAL LIGATION         Review of patient's allergies indicates:   Allergen Reactions    Asmanex twisthaler  [mometasone]      Other reaction(s): Rhinitis  Other reaction(s): Headache  Other reaction(s): Eye irritation    Aspirin      Other reaction(s): Unknown    Brovana  [arformoterol] Nausea Only     Other reaction(s): Headache  Other reaction(s): Dry Nose    Butisol  [butabarbital]      Other reaction(s): Unknown    Codeine      Other reaction(s): Unknown    Diazepam      Other reaction(s): Unknown    Iodine      Other reaction(s): Unknown    Limbitrol  [amitriptyline-chlordiazepoxide]      Other reaction(s): Unknown  Other reaction(s): Unknown    Medrol  [methylprednisolone]      Other reaction(s): Shortness of breath  Other reaction(s): Shortness of breath    Pcn [penicillins]     Sulfa (sulfonamide antibiotics)      Other reaction(s): Unknown       No current facility-administered medications on file prior to encounter.      Current Outpatient Medications on File Prior to Encounter   Medication Sig    albuterol 90 mcg/actuation inhaler Inhale 2 puffs into the lungs every 6 (six) hours.    albuterol-ipratropium (DUO-NEB) 2.5 mg-0.5 mg/3 mL nebulizer solution USE 1 VIAL VIA NEBULIZER EVERY 6 HOURS WHILE AWAKE    budesonide (PULMICORT) 0.5 mg/2 mL nebulizer  solution Take 2 mLs (0.5 mg total) by nebulization 2 (two) times daily.    cholecalciferol, vitamin D3, (VITAMIN D3) 2,000 unit Cap Take 1 capsule by mouth once daily.    dasatinib (SPRYCEL) 50 MG tablet Take 1 tablet (50 mg total) by mouth every other day.    furosemide (LASIX) 20 MG tablet Take 1 tablet (20 mg total) by mouth 2 (two) times daily. (Patient taking differently: Take 20 mg by mouth 2 (two) times daily. Pt takes two tablets once a day)    melatonin 3 mg TbDL Take by mouth. May be 5 mg, not sure    polyethylene glycol (GLYCOLAX) 17 gram PwPk Take 17 g by mouth once daily. Hold for diarrhea    RANEXA 500 mg Tb12 TAKE 1 TABLET TWICE A DAY    ranitidine (ZANTAC) 150 MG tablet Take 150 mg by mouth 2 (two) times daily.    senna-docusate 8.6-50 mg (PERICOLACE) 8.6-50 mg per tablet Take 2 tablets by mouth 2 (two) times daily as needed for Constipation.    simethicone (MYLICON) 125 MG chewable tablet Take 125 mg by mouth every 6 (six) hours as needed for Flatulence. Not sure of dose     Family History     Problem Relation (Age of Onset)    COPD Mother, Sister, Brother    Cancer Sister    Heart disease Father        Tobacco Use    Smoking status: Never Smoker    Smokeless tobacco: Never Used   Substance and Sexual Activity    Alcohol use: No    Drug use: No    Sexual activity: No     Review of Systems   Constitutional: Positive for fatigue and unexpected weight change. Negative for appetite change, chills and fever.   HENT: Negative.  Negative for congestion, ear discharge, facial swelling, sore throat and trouble swallowing.    Eyes: Negative.  Negative for photophobia, pain, discharge, redness and visual disturbance.   Respiratory: Positive for cough and shortness of breath. Negative for chest tightness, wheezing and stridor.    Cardiovascular: Positive for leg swelling. Negative for chest pain and palpitations.   Gastrointestinal: Negative for abdominal distention, abdominal pain, anal bleeding,  blood in stool, constipation, diarrhea, nausea, rectal pain and vomiting.   Endocrine: Negative.  Negative for cold intolerance, heat intolerance, polydipsia, polyphagia and polyuria.   Genitourinary: Negative.  Negative for difficulty urinating, dysuria, flank pain, frequency, hematuria, pelvic pain, urgency, vaginal bleeding and vaginal discharge.   Musculoskeletal: Negative.  Negative for arthralgias, back pain, gait problem, joint swelling, myalgias and neck pain.   Skin: Positive for pallor. Negative for color change, rash and wound.   Allergic/Immunologic: Negative.  Negative for food allergies and immunocompromised state.   Neurological: Positive for weakness. Negative for dizziness, tremors, seizures, syncope, facial asymmetry, speech difficulty, light-headedness, numbness and headaches.   Hematological: Negative.  Negative for adenopathy. Does not bruise/bleed easily.   Psychiatric/Behavioral: Positive for agitation. Negative for confusion, hallucinations, sleep disturbance and suicidal ideas. The patient is not nervous/anxious.    All other systems reviewed and are negative.    Objective:     Vital Signs (Most Recent):  Temp: 97.9 °F (36.6 °C) (10/14/18 0952)  Pulse: 63 (10/14/18 1400)  Resp: (!) 23 (10/14/18 1400)  BP: (!) 122/53 (10/14/18 1400)  SpO2: (!) 86 % (10/14/18 1400) Vital Signs (24h Range):  Temp:  [97.9 °F (36.6 °C)] 97.9 °F (36.6 °C)  Pulse:  [63-90] 63  Resp:  [15-31] 23  SpO2:  [68 %-100 %] 86 %  BP: (107-130)/(53-66) 122/53     Weight: 60.7 kg (133 lb 14.4 oz)  Body mass index is 22.98 kg/m².    Physical Exam   Constitutional: She is oriented to person, place, and time. She appears well-developed and well-nourished. No distress.   HENT:   Head: Normocephalic and atraumatic.   Nose: Nose normal.   Eyes: Conjunctivae and EOM are normal. Pupils are equal, round, and reactive to light. Right eye exhibits no discharge. Left eye exhibits no discharge.   Neck: Normal range of motion. Neck supple.  No JVD present. No tracheal deviation present. No thyromegaly present.   Cardiovascular: Normal rate and regular rhythm. Exam reveals no gallop and no friction rub.   No murmur heard.  2/6 EDUARDO   Pulmonary/Chest: No stridor. She is in respiratory distress. She has no wheezes. She has rales. She exhibits no tenderness.   Abdominal: Soft. Bowel sounds are normal. She exhibits no distension and no mass. There is no tenderness. There is no guarding.   Musculoskeletal: Normal range of motion. She exhibits edema. She exhibits no tenderness or deformity.   Lymphadenopathy:     She has no cervical adenopathy.   Neurological: She is alert and oriented to person, place, and time. She has normal reflexes. No cranial nerve deficit. Coordination normal.   Skin: Skin is warm and dry. No rash noted. She is not diaphoretic. No erythema. No pallor.   Nursing note and vitals reviewed.        CRANIAL NERVES     CN III, IV, VI   Pupils are equal, round, and reactive to light.  Extraocular motions are normal.      Significant Labs:   CBC:   Recent Labs   Lab  10/14/18   1003   WBC  9.69   HGB  11.6*   HCT  36.0*   PLT  148*     CMP:   Recent Labs   Lab  10/14/18   1003   NA  136   K  4.7   CL  95   CO2  30*   GLU  134*   BUN  18   CREATININE  0.8   CALCIUM  9.1   PROT  6.9   ALBUMIN  3.5   BILITOT  0.7   ALKPHOS  65   AST  25   ALT  12   ANIONGAP  11   EGFRNONAA  >60     Cardiac Markers:   Recent Labs   Lab  10/14/18   1003   BNP  1,788*     Lactic Acid:   Recent Labs   Lab  10/14/18   1210   LACTATE  0.6       Significant Imaging: I have reviewed all pertinent imaging results/findings within the past 24 hours.   Imaging Results          X-Ray Chest AP Portable (Final result)  Result time 10/14/18 10:35:17    Final result by Stu Amaodr MD (10/14/18 10:35:17)                 Impression:      Findings suggest some developing consolidation and atelectasis in the left lower lobe.      Electronically signed by: Nelson Amador  MD  Date:    10/14/2018  Time:    10:35             Narrative:    EXAMINATION:  XR CHEST AP PORTABLE    CLINICAL HISTORY:  CHF;    TECHNIQUE:  Single frontal view of the chest was performed.    COMPARISON:  07/10/2018    FINDINGS:  There is mild cardiomegaly.  There is a chronic pattern of coarsened reticulonodular interstitial markings within the lungs ting chronic changes of interstitial disease.  There is more localized patchy area of consolidation in the lung base suggesting developing consolidation or atelectasis.                                  Assessment/Plan:     * Congestive heart failure    Acute on chronic diastolic CHF  -IV lasix 40mg BID  -cardiac diet with 1500 milliliter(s) fluid restriction  -daily weights  -strict I &O  -No BB due to hypotension  - No ACE due to hypotension  - Cardiology consult: Dr. Jeffers spoke with Patient and son  -Chest Xray showed possible Pneumonia: nml WBC, afebrile, procalcitonin nml - No Abx at this time.          Critical aortic valve stenosis    -Cardiology consulted: Dr. Jeffers spoke with son and patient  -DNR  -Patient refused surgery, not a surgical candidate          Acute on chronic respiratory failure with hypoxia    -on Bipap in the ER, weaned off and on NC.          Chronic obstructive pulmonary disease    Duonebs q 8 hours          CML (chronic myelocytic leukemia)    Outpatient management            VTE Risk Mitigation (From admission, onward)        Ordered     IP VTE HIGH RISK PATIENT  Once      10/14/18 1219     Place RADHA hose  Until discontinued      10/14/18 1219     Place sequential compression device  Until discontinued      10/14/18 1219        Kristen Zavaleta NP  Department of Hospital Medicine   [facility]

## 2018-10-14 NOTE — SUBJECTIVE & OBJECTIVE
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 Unruly Ball    CHF (congestive heart failure)     Clotting disorder     COPD (chronic obstructive pulmonary disease)     Coronary artery disease     CVA (cerebral infarction) 2013    Depression     Fall 2013    Heart murmur     Hyperlipidemia     Osteoporosis 2017    Neck of femur (T score -2.5)    Palpitation 2015    Sleep apnea     Traumatic brain injury 2013    Ulcer        Past Surgical History:   Procedure Laterality Date    APPENDECTOMY       SECTION      TUBAL LIGATION         Review of patient's allergies indicates:   Allergen Reactions    Asmanex twisthaler  [mometasone]      Other reaction(s): Rhinitis  Other reaction(s): Headache  Other reaction(s): Eye irritation    Aspirin      Other reaction(s): Unknown    Brovana  [arformoterol] Nausea Only     Other reaction(s): Headache  Other reaction(s): Dry Nose    Butisol  [butabarbital]      Other reaction(s): Unknown    Codeine      Other reaction(s): Unknown    Diazepam      Other reaction(s): Unknown    Iodine      Other reaction(s): Unknown    Limbitrol  [amitriptyline-chlordiazepoxide]      Other reaction(s): Unknown  Other reaction(s): Unknown    Medrol  [methylprednisolone]      Other reaction(s): Shortness of breath  Other reaction(s): Shortness of breath    Pcn [penicillins]     Sulfa (sulfonamide antibiotics)      Other reaction(s): Unknown       No current facility-administered medications on file prior to encounter.      Current Outpatient Medications on File Prior to Encounter   Medication Sig    albuterol 90 mcg/actuation inhaler Inhale 2 puffs into the lungs every 6 (six) hours.    albuterol-ipratropium (DUO-NEB) 2.5 mg-0.5 mg/3 mL nebulizer solution USE 1 VIAL  VIA NEBULIZER EVERY 6 HOURS WHILE AWAKE    budesonide (PULMICORT) 0.5 mg/2 mL nebulizer solution Take 2 mLs (0.5 mg total) by nebulization 2 (two) times daily.    cholecalciferol, vitamin D3, (VITAMIN D3) 2,000 unit Cap Take 1 capsule by mouth once daily.    dasatinib (SPRYCEL) 50 MG tablet Take 1 tablet (50 mg total) by mouth every other day.    furosemide (LASIX) 20 MG tablet Take 1 tablet (20 mg total) by mouth 2 (two) times daily. (Patient taking differently: Take 20 mg by mouth 2 (two) times daily. Pt takes two tablets once a day)    melatonin 3 mg TbDL Take by mouth. May be 5 mg, not sure    polyethylene glycol (GLYCOLAX) 17 gram PwPk Take 17 g by mouth once daily. Hold for diarrhea    RANEXA 500 mg Tb12 TAKE 1 TABLET TWICE A DAY    ranitidine (ZANTAC) 150 MG tablet Take 150 mg by mouth 2 (two) times daily.    senna-docusate 8.6-50 mg (PERICOLACE) 8.6-50 mg per tablet Take 2 tablets by mouth 2 (two) times daily as needed for Constipation.    simethicone (MYLICON) 125 MG chewable tablet Take 125 mg by mouth every 6 (six) hours as needed for Flatulence. Not sure of dose     Family History     Problem Relation (Age of Onset)    COPD Mother, Sister, Brother    Cancer Sister    Heart disease Father        Tobacco Use    Smoking status: Never Smoker    Smokeless tobacco: Never Used   Substance and Sexual Activity    Alcohol use: No    Drug use: No    Sexual activity: No     Review of Systems   Constitution: Negative for diaphoresis, weakness, malaise/fatigue and weight gain.   HENT: Negative for hoarse voice.    Eyes: Negative for double vision and visual disturbance.   Cardiovascular: Positive for dyspnea on exertion and leg swelling. Negative for chest pain, claudication, cyanosis, irregular heartbeat, near-syncope, orthopnea, palpitations, paroxysmal nocturnal dyspnea and syncope.   Respiratory: Positive for cough and shortness of breath. Negative for hemoptysis and snoring.     Hematologic/Lymphatic: Negative for bleeding problem. Does not bruise/bleed easily.   Skin: Negative for color change and poor wound healing.   Musculoskeletal: Negative for muscle cramps, muscle weakness and myalgias.   Gastrointestinal: Negative for bloating, abdominal pain, change in bowel habit, diarrhea, heartburn, hematemesis, hematochezia, melena and nausea.   Neurological: Negative for excessive daytime sleepiness, dizziness, headaches, light-headedness, loss of balance and numbness.   Psychiatric/Behavioral: Negative for memory loss. The patient does not have insomnia.    Allergic/Immunologic: Negative for hives.     Objective:     Vital Signs (Most Recent):  Temp: 97.9 °F (36.6 °C) (10/14/18 0952)  Pulse: 63 (10/14/18 1400)  Resp: (!) 23 (10/14/18 1400)  BP: (!) 122/53 (10/14/18 1400)  SpO2: (!) 86 % (10/14/18 1400) Vital Signs (24h Range):  Temp:  [97.9 °F (36.6 °C)] 97.9 °F (36.6 °C)  Pulse:  [63-90] 63  Resp:  [15-31] 23  SpO2:  [68 %-100 %] 86 %  BP: (107-130)/(53-66) 122/53     Weight: 60.7 kg (133 lb 14.4 oz)  Body mass index is 22.98 kg/m².    SpO2: (!) 86 %  O2 Device (Oxygen Therapy): Other (see comments)(AVAPS)      Intake/Output Summary (Last 24 hours) at 10/14/2018 1453  Last data filed at 10/14/2018 1259  Gross per 24 hour   Intake 50 ml   Output --   Net 50 ml       Lines/Drains/Airways     Drain                 Urethral Catheter 10/14/18 1230 Latex 16 Fr. less than 1 day          Peripheral Intravenous Line                 Peripheral IV - Single Lumen 10/14/18 1001 Right Antecubital less than 1 day                Physical Exam   Constitutional: She is oriented to person, place, and time. She appears well-developed and well-nourished. She does not appear ill. She appears distressed.   FRAIL LOOKING   HENT:   Head: Normocephalic and atraumatic.   Eyes: EOM are normal. Pupils are equal, round, and reactive to light. No scleral icterus.   Neck: Normal range of motion. Neck supple. Normal  carotid pulses, no hepatojugular reflux and no JVD present. Carotid bruit is not present. No tracheal deviation present. No thyromegaly present.   Cardiovascular: Normal rate, regular rhythm, S1 normal and normal pulses. Exam reveals no gallop and no friction rub.   Murmur heard.   Harsh midsystolic murmur is present with a grade of 3/6 at the upper right sternal border radiating to the neck.  ABSENT S2   Pulmonary/Chest: She is in respiratory distress. She has wheezes. She has no rhonchi. She has rales. She exhibits no tenderness.   Abdominal: Soft. Normal appearance, normal aorta and bowel sounds are normal. She exhibits no distension, no abdominal bruit, no ascites and no pulsatile midline mass. There is no hepatomegaly. There is no tenderness.   Musculoskeletal: She exhibits edema (TRACE).        Right shoulder: She exhibits no deformity.   Neurological: She is alert and oriented to person, place, and time. She has normal strength. No cranial nerve deficit. Coordination normal.   Skin: Skin is warm and dry. No rash noted. No cyanosis or erythema. Nails show no clubbing.   Psychiatric: She has a normal mood and affect. Her speech is normal and behavior is normal.   Nursing note and vitals reviewed.      Significant Labs:   ABG:   Recent Labs   Lab  10/14/18   1018  10/14/18   1318   PH  7.391  7.334*   PCO2  57.0*  70.8*   HCO3  34.6*  37.6*   POCSATURATED  62*  92*   BE  10  12   , Blood Culture: No results for input(s): LABBLOO in the last 48 hours., BMP:   Recent Labs   Lab  10/14/18   1003   GLU  134*   NA  136   K  4.7   CL  95   CO2  30*   BUN  18   CREATININE  0.8   CALCIUM  9.1   , CMP   Recent Labs   Lab  10/14/18   1003   NA  136   K  4.7   CL  95   CO2  30*   GLU  134*   BUN  18   CREATININE  0.8   CALCIUM  9.1   PROT  6.9   ALBUMIN  3.5   BILITOT  0.7   ALKPHOS  65   AST  25   ALT  12   ANIONGAP  11   ESTGFRAFRICA  >60   EGFRNONAA  >60   , CBC   Recent Labs   Lab  10/14/18   1003   WBC  9.69   HGB   11.6*   HCT  36.0*   PLT  148*   , INR   Recent Labs   Lab  10/14/18   1003   INR  1.0   , Lipid Panel No results for input(s): CHOL, HDL, LDLCALC, TRIG, CHOLHDL in the last 48 hours.,   Pathology Results  (Last 10 years)               05/20/13 0000  Tissue Specimen to Pathology Edited    05/20/13 0000  Tissue Specimen to Pathology Edited    05/20/13 0000  Tissue Specimen to Pathology Edited      , Troponin   Recent Labs   Lab  10/14/18   1003  10/14/18   1255   TROPONINI  0.272*  0.394*    and All pertinent lab results from the last 24 hours have been reviewed.    Significant Imaging:REVIEWED CXR

## 2018-10-14 NOTE — ED PROVIDER NOTES
"SCRIBE #1 NOTE: I, Olga Olivas, am scribing for, and in the presence of, Umer Romero MD. I have scribed the entire note.      History      Chief Complaint   Patient presents with    Shortness of Breath     Pt family states, Her oxygen is low."       Review of patient's allergies indicates:   Allergen Reactions    Asmanex twisthaler  [mometasone]      Other reaction(s): Rhinitis  Other reaction(s): Headache  Other reaction(s): Eye irritation    Aspirin      Other reaction(s): Unknown    Brovana  [arformoterol] Nausea Only     Other reaction(s): Headache  Other reaction(s): Dry Nose    Butisol  [butabarbital]      Other reaction(s): Unknown    Codeine      Other reaction(s): Unknown    Diazepam      Other reaction(s): Unknown    Iodine      Other reaction(s): Unknown    Limbitrol  [amitriptyline-chlordiazepoxide]      Other reaction(s): Unknown  Other reaction(s): Unknown    Medrol  [methylprednisolone]      Other reaction(s): Shortness of breath  Other reaction(s): Shortness of breath    Pcn [penicillins]     Sulfa (sulfonamide antibiotics)      Other reaction(s): Unknown        HPI   HPI    10/14/2018, 10:17 AM   History obtained from the son and patient      History of Present Illness: Carmela Maria is a 89 y.o. female patient with PMHx of CHF who presents to the Emergency Department for evaluation of shortness of breath. Symptoms are constant and moderate in severity. Son states he has called EMS twice this week for sxs. Son reports EMS gave her oxygen and sxs improved. Son reports pt seemed more confused today. Son reports "supposedly" took her 20mg lasix this morning but is not sure. No mitigating or exacerbating factors reported. Pt denies any recent long distance traveling. Associated sxs include productive cough. Patient denies any fever, chills, lightheadedness, diaphoresis, CP, nausea, emesis, leg swelling, and all other sxs at this time. No prior Tx. No further complaints or concerns at this " time.         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 Unruly Ball    CHF (congestive heart failure)     Clotting disorder     COPD (chronic obstructive pulmonary disease)     Coronary artery disease     CVA (cerebral infarction) 2013    Depression     Fall 2013    Heart murmur     Hyperlipidemia     Osteoporosis 2017    Neck of femur (T score -2.5)    Palpitation 2015    Sleep apnea     Traumatic brain injury 2013    Ulcer        Past Surgical History:  Past Surgical History:   Procedure Laterality Date    APPENDECTOMY       SECTION      TUBAL LIGATION           Family History:  Family History   Problem Relation Age of Onset    COPD Mother     Heart disease Father     Cancer Sister     COPD Sister     COPD Brother        Social History:  Social History     Tobacco Use    Smoking status: Never Smoker    Smokeless tobacco: Never Used   Substance and Sexual Activity    Alcohol use: No    Drug use: No    Sexual activity: No       ROS   Review of Systems   Constitutional: Negative for chills and fever.   HENT: Negative for sore throat.    Respiratory: Positive for cough and shortness of breath.    Cardiovascular: Negative for chest pain.   Gastrointestinal: Negative for abdominal pain, diarrhea, nausea and vomiting.   Genitourinary: Negative for dysuria.   Musculoskeletal: Negative for back pain and neck pain.   Skin: Negative for rash.   Neurological: Negative for dizziness, syncope, speech difficulty, weakness, numbness and headaches.   Hematological: Does not bruise/bleed easily.   All other systems reviewed and are negative.      Physical Exam      Initial Vitals [10/14/18 0952]   BP  Pulse Resp Temp SpO2   127/61 90 20 97.9 °F (36.6 °C) (!) 68 %      MAP       --          Physical Exam  Nursing Notes and Vital Signs Reviewed.   Constitutional: Patient is in mild distress. Frail. Elderly  Head: Atraumatic. Normocephalic.  Eyes: PERRL. EOM intact. Conjunctivae are not pale. No scleral icterus.  ENT: Mucous membranes are dry. Oropharynx is clear and symmetric.    Neck: Supple. Full ROM. No lymphadenopathy.  Cardiovascular: Regular rate. Regular rhythm. 3/6 systolic murmur. Distal pulses are 2+ and symmetric.  Pulmonary/Chest: Mild respiratory distress. Rales bilaterally.   Abdominal: Soft and non-distended.  There is no tenderness.  No rebound, guarding, or rigidity. Good bowel sounds.  Genitourinary: No CVA tenderness  Musculoskeletal: Moves all extremities. No obvious deformities. No edema. No calf tenderness.  Skin: Warm and dry.  Neurological:  Alert, awake, and appropriate.  Normal speech.  No acute focal neurological deficits are appreciated.  Psychiatric: Normal affect. Good eye contact. Appropriate in content.    ED Course    Critical Care  Date/Time: 10/14/2018 1:02 PM  Performed by: Umer Romero MD  Authorized by: Umer Romero MD   Direct patient critical care time: 10 minutes  Additional history critical care time: 5 minutes  Ordering / reviewing critical care time: 10 minutes  Documentation critical care time: 5 minutes  Consult with family critical care time: 5 minutes  Other critical care time: 10 minutes  Total critical care time (exclusive of procedural time) : 45 minutes  Critical care was necessary to treat or prevent imminent or life-threatening deterioration of the following conditions: respiratory failure.  Critical care was time spent personally by me on the following activities: blood draw for specimens, development of treatment plan with patient or surrogate, discussions with consultants, interpretation of cardiac output measurements, evaluation of patient's response to  "treatment, examination of patient, obtaining history from patient or surrogate, ordering and performing treatments and interventions, ordering and review of laboratory studies, ordering and review of radiographic studies, pulse oximetry, re-evaluation of patient's condition and review of old charts.        ED Vital Signs:  Vitals:    10/14/18 0952 10/14/18 1000 10/14/18 1001 10/14/18 1015   BP: 127/61 130/66  (!) 125/59   Pulse: 90 84 84 85   Resp: 20 (!) 31  (!) 28   Temp: 97.9 °F (36.6 °C)      TempSrc: Oral      SpO2: (!) 68% (!) 93%  100%   Weight:  60.7 kg (133 lb 14.4 oz)     Height: 5' 4" (1.626 m)       10/14/18 1020   BP:    Pulse: 80   Resp: 15   Temp:    TempSrc:    SpO2: 99%   Weight:    Height:        Abnormal Lab Results:  Labs Reviewed   CBC W/ AUTO DIFFERENTIAL - Abnormal; Notable for the following components:       Result Value    RBC 3.65 (*)     Hemoglobin 11.6 (*)     Hematocrit 36.0 (*)     MCV 99 (*)     MCH 31.8 (*)     RDW 15.8 (*)     Platelets 148 (*)     Gran # (ANC) 7.9 (*)     Gran% 81.4 (*)     Lymph% 10.8 (*)     All other components within normal limits   ISTAT PROCEDURE - Abnormal; Notable for the following components:    POC PCO2 57.0 (*)     POC PO2 33 (*)     POC HCO3 34.6 (*)     POC SATURATED O2 62 (*)     All other components within normal limits   CULTURE, URINE   CULTURE, BLOOD   CULTURE, BLOOD   PROTIME-INR   COMPREHENSIVE METABOLIC PANEL   TROPONIN I   B-TYPE NATRIURETIC PEPTIDE   LACTIC ACID, PLASMA   URINALYSIS        All Lab Results:  Results for orders placed or performed during the hospital encounter of 10/14/18   CBC auto differential   Result Value Ref Range    WBC 9.69 3.90 - 12.70 K/uL    RBC 3.65 (L) 4.00 - 5.40 M/uL    Hemoglobin 11.6 (L) 12.0 - 16.0 g/dL    Hematocrit 36.0 (L) 37.0 - 48.5 %    MCV 99 (H) 82 - 98 fL    MCH 31.8 (H) 27.0 - 31.0 pg    MCHC 32.2 32.0 - 36.0 g/dL    RDW 15.8 (H) 11.5 - 14.5 %    Platelets 148 (L) 150 - 350 K/uL    MPV 12.8 9.2 - 12.9 " fL    Gran # (ANC) 7.9 (H) 1.8 - 7.7 K/uL    Lymph # 1.1 1.0 - 4.8 K/uL    Mono # 0.7 0.3 - 1.0 K/uL    Eos # 0.0 0.0 - 0.5 K/uL    Baso # 0.02 0.00 - 0.20 K/uL    Gran% 81.4 (H) 38.0 - 73.0 %    Lymph% 10.8 (L) 18.0 - 48.0 %    Mono% 7.3 4.0 - 15.0 %    Eosinophil% 0.3 0.0 - 8.0 %    Basophil% 0.2 0.0 - 1.9 %    Differential Method Automated    Protime-INR   Result Value Ref Range    Prothrombin Time 9.9 9.0 - 12.5 sec    INR 1.0 0.8 - 1.2   ISTAT PROCEDURE   Result Value Ref Range    POC PH 7.391 7.35 - 7.45    POC PCO2 57.0 (HH) 35 - 45 mmHg    POC PO2 33 (LL) 80 - 100 mmHg    POC HCO3 34.6 (H) 24 - 28 mmol/L    POC BE 10 -2 to 2 mmol/L    POC SATURATED O2 62 (L) 95 - 100 %    Sample ARTERIAL     Site RR     Allens Test Pass     DelSys Room Air     Mode SPONT          Imaging Results:  Imaging Results          X-Ray Chest AP Portable (Final result)  Result time 10/14/18 10:35:17    Final result by Stu Amador MD (10/14/18 10:35:17)                 Impression:      Findings suggest some developing consolidation and atelectasis in the left lower lobe.      Electronically signed by: Nelson Amador MD  Date:    10/14/2018  Time:    10:35             Narrative:    EXAMINATION:  XR CHEST AP PORTABLE    CLINICAL HISTORY:  CHF;    TECHNIQUE:  Single frontal view of the chest was performed.    COMPARISON:  07/10/2018    FINDINGS:  There is mild cardiomegaly.  There is a chronic pattern of coarsened reticulonodular interstitial markings within the lungs ting chronic changes of interstitial disease.  There is more localized patchy area of consolidation in the lung base suggesting developing consolidation or atelectasis.                               The EKG was ordered, reviewed, and independently interpreted by the ED provider.  Interpretation time: 9:53  Rate: 87 BPM  Rhythm: normal sinus rhythm  Interpretation: Left ventricular hypertrophy with repolarization abnormality. No STEMI.             The Emergency  Provider reviewed the vital signs and test results, which are outlined above.    ED Discussion       10:40 AM: Labs via fax, WBC 9.69, otherwise no significant abnormalities.    11:39 AM: Labs via fax reviewed, Troponin .272, Creatinine .77, and BNP 1787.     11:41 AM: Discussed case with Radhika Zavaleta NP ([facility]). Dr. Fleming agrees with current care and management of pt and accepts admission.   Admitting Service: [facility] medicine   Admitting Physician: Dr. Fleming  Admit to: ICU    11:45 AM: Re-evaluated pt. I have discussed test results, shared treatment plan, and the need for admission with patient and family at bedside. Pt and family express understanding at this time and agree with all information. All questions answered. Pt and family have no further questions or concerns at this time. Pt is ready for admit.    12:17 PM: Pt's son reports pt has a DNR.    12:46 PM: Lindsey Daigle NP ([facility]) evaluated pt at this charla and recommends putting pt on BiPAP at this time and admit pt to ICU due to pt's O2 stats being in the lower 80s.    Dr. Jeffers and Dr. Fleming in ED to discuss poor prognosis and DNR with pt/family.  Patient seems slightly more alert and grabbing mask to pull it off so clinically she is probably improving on her CO2 retention.  Family asking for medicines to settle her down however we advised her that decreasing her respiratory drive at this time could be catastrophic.  Patient is calm and tolerating treatment at this time.        ED Medication(s):  Medications   vancomycin in dextrose 5 % 1 gram/250 mL IVPB 1,000 mg (not administered)   aztreonam 1 g in dextrose 5 % 50 mL IVPB (ready to mix system) (not administered)   furosemide injection 40 mg (40 mg Intravenous Given 10/14/18 1008)   nitroGLYCERIN 2% TD oint ointment 1 inch (1 inch Topical (Top) Given 10/14/18 1008)       This SmartLink is deprecated. Use [employer] instead to display the medication list for a patient.           Medical Decision Making    Medical Decision Making:   Clinical Tests:   Lab Tests: Ordered and Reviewed  Radiological Study: Ordered and Reviewed  Medical Tests: Ordered and Reviewed           Scribe Attestation:   Scribe #1: I performed the above scribed service and the documentation accurately describes the services I performed. I attest to the accuracy of the note.    Attending:   Physician Attestation Statement for Scribe #1: I, Umer Romero MD, personally performed the services described in this documentation, as scribed by Olga Olivas, in my presence, and it is both accurate and complete.          Clinical Impression       ICD-10-CM ICD-9-CM   1. Congestive heart failure, unspecified HF chronicity, unspecified heart failure type I50.9 428.0   2. Shortness of breath R06.02 786.05   3. Pneumonia of left lower lobe due to infectious organism J18.1 486   4. Hypoxia R09.02 799.02       Disposition:   Disposition: Admitted  Condition: Serious         Umer Romero MD  10/15/18 0639       Umer Romero MD  10/15/18 0645

## 2018-10-15 LAB
ALBUMIN SERPL BCP-MCNC: 3 G/DL
ALP SERPL-CCNC: 55 U/L
ALT SERPL W/O P-5'-P-CCNC: 11 U/L
ANION GAP SERPL CALC-SCNC: 6 MMOL/L
AST SERPL-CCNC: 20 U/L
BASOPHILS # BLD AUTO: 0.01 K/UL
BASOPHILS NFR BLD: 0.2 %
BILIRUB SERPL-MCNC: 0.4 MG/DL
BUN SERPL-MCNC: 16 MG/DL
CALCIUM SERPL-MCNC: 8.4 MG/DL
CHLORIDE SERPL-SCNC: 93 MMOL/L
CO2 SERPL-SCNC: 36 MMOL/L
CREAT SERPL-MCNC: 0.7 MG/DL
DIFFERENTIAL METHOD: ABNORMAL
EOSINOPHIL # BLD AUTO: 0.1 K/UL
EOSINOPHIL NFR BLD: 2.2 %
ERYTHROCYTE [DISTWIDTH] IN BLOOD BY AUTOMATED COUNT: 15.5 %
EST. GFR  (AFRICAN AMERICAN): >60 ML/MIN/1.73 M^2
EST. GFR  (NON AFRICAN AMERICAN): >60 ML/MIN/1.73 M^2
GLUCOSE SERPL-MCNC: 96 MG/DL
HCT VFR BLD AUTO: 34 %
HGB BLD-MCNC: 10.6 G/DL
LYMPHOCYTES # BLD AUTO: 1.2 K/UL
LYMPHOCYTES NFR BLD: 18.8 %
MCH RBC QN AUTO: 31.3 PG
MCHC RBC AUTO-ENTMCNC: 31.2 G/DL
MCV RBC AUTO: 100 FL
MONOCYTES # BLD AUTO: 0.7 K/UL
MONOCYTES NFR BLD: 10.3 %
NEUTROPHILS # BLD AUTO: 4.5 K/UL
NEUTROPHILS NFR BLD: 68.5 %
PLATELET # BLD AUTO: 136 K/UL
PMV BLD AUTO: 13.2 FL
POTASSIUM SERPL-SCNC: 4 MMOL/L
PROT SERPL-MCNC: 6 G/DL
RBC # BLD AUTO: 3.39 M/UL
SODIUM SERPL-SCNC: 135 MMOL/L
WBC # BLD AUTO: 6.5 K/UL

## 2018-10-15 PROCEDURE — 99233 SBSQ HOSP IP/OBS HIGH 50: CPT | Mod: ,,, | Performed by: INTERNAL MEDICINE

## 2018-10-15 PROCEDURE — 36415 COLL VENOUS BLD VENIPUNCTURE: CPT

## 2018-10-15 PROCEDURE — 27000221 HC OXYGEN, UP TO 24 HOURS

## 2018-10-15 PROCEDURE — 63600175 PHARM REV CODE 636 W HCPCS: Performed by: FAMILY MEDICINE

## 2018-10-15 PROCEDURE — 25000003 PHARM REV CODE 250: Performed by: EMERGENCY MEDICINE

## 2018-10-15 PROCEDURE — 94761 N-INVAS EAR/PLS OXIMETRY MLT: CPT

## 2018-10-15 PROCEDURE — 80053 COMPREHEN METABOLIC PANEL: CPT

## 2018-10-15 PROCEDURE — 21400001 HC TELEMETRY ROOM

## 2018-10-15 PROCEDURE — 85025 COMPLETE CBC W/AUTO DIFF WBC: CPT

## 2018-10-15 PROCEDURE — 25000003 PHARM REV CODE 250: Performed by: FAMILY MEDICINE

## 2018-10-15 RX ORDER — FUROSEMIDE 10 MG/ML
20 INJECTION INTRAMUSCULAR; INTRAVENOUS 2 TIMES DAILY
Status: DISCONTINUED | OUTPATIENT
Start: 2018-10-15 | End: 2018-10-16 | Stop reason: HOSPADM

## 2018-10-15 RX ORDER — MOXIFLOXACIN HYDROCHLORIDE 400 MG/1
400 TABLET ORAL DAILY
Status: DISCONTINUED | OUTPATIENT
Start: 2018-10-15 | End: 2018-10-16 | Stop reason: HOSPADM

## 2018-10-15 RX ORDER — FAMOTIDINE 20 MG/1
20 TABLET, FILM COATED ORAL DAILY
Status: DISCONTINUED | OUTPATIENT
Start: 2018-10-16 | End: 2018-10-16 | Stop reason: HOSPADM

## 2018-10-15 RX ADMIN — FUROSEMIDE 20 MG: 10 INJECTION, SOLUTION INTRAMUSCULAR; INTRAVENOUS at 10:10

## 2018-10-15 RX ADMIN — MOXIFLOXACIN HYDROCHLORIDE 400 MG: 400 TABLET, FILM COATED ORAL at 10:10

## 2018-10-15 RX ADMIN — FAMOTIDINE 20 MG: 20 TABLET ORAL at 10:10

## 2018-10-15 RX ADMIN — ACETAMINOPHEN 650 MG: 325 TABLET ORAL at 03:10

## 2018-10-15 NOTE — PLAN OF CARE
Met with patients family at bedside. Provided patient preference form for resuming HH services. Patient's daughter-in-law signed patient preference choice form for [facility]. Placed signed form in patients blue folder. Made home health referral through [facility]. MSW will continue to follow with discharge needs.

## 2018-10-15 NOTE — HOSPITAL COURSE
Pt admitted for pulm edema. Lasix was on hold overnight due to hypotension. Resumed Lasix IV BID and will monitor closely. Discussed hospice with son who is not ready yet given dad and uncle had passed away recently this year. Pt diuresed approximately 1 L so far. On 4LPM.     10/16:Pt's oxygen weaned down to 2.5L.  Pt diuresed 1.2L total so far. Spoke to family with cards about pt's poor prognosis. Hospice is rec'd. Family agrees to in hospice since family unable to take care of pt at home. Pt is accepted to [facility]. Seen and examined. Deemed suitable for d/c.

## 2018-10-15 NOTE — PROGRESS NOTES
"[facility] Hospital Medicine  Progress Note    Patient Name: Carmela Maria  MRN: [id number]  Patient Class: IP- Inpatient   Admission Date: 10/14/2018  Length of Stay: 1 days  Attending Physician: Светлана Fleming MD  Primary Care Provider: Aden Jones MD        Subjective:     Principal Problem:Congestive heart failure    HPI:  Carmela Maria is a 89 y.o. female patient with PMHx of diastolic CHF, critical aortic stenosis, CML, COPD, who presents to the ER for evaluation of  c/o shortness of breath. Symptoms are constant and moderate in severity. Son stated he has called EMS twice this week for sxs and he stated he took her O2 sats at home which were in the 70's. Son reports EMS gave her oxygen and sxs improved. Yesterday, she refused to come to the hospital. Son reports pt seemed more confused today. Son reports "supposedly" took her 20mg lasix this morning but is not sure. Pt denies any recent long distance traveling. Associated sxs included productive cough and leg swelling. Patient denies any fever, chills, lightheadedness, diaphoresis, CP, nausea, emesis, and all other sxs at this time. In ER, ABG's showed PO2 33 and was placed on bipap, Troponin 0.394, BNP 1788. Pt has a Living Will, but son wanted her to be a FULL CODE. Dr. Jeffers, Cardiology, was consulted and told the the son, the patient told him she didn't want life support. Son then agreed to DNR. Son was told she has critical AS and is terminal. After 60 mg IV Lasix, she was able to come off Bipap and on NC. Son, Ede Payne, is surrogate decision maker. She is admitted with acute on chronic diastolic heart failure with critical aortic stenosis to ICU on bipap.            Hospital Course:  Pt admitted for pulm edema. Lasix was on hold overnight due to hypotension. Resuming Lasix IV BID and will monitor closely. Discussed hospice with son who is not ready yet given dad and uncle had passed away recently this year. Pt diuresed " approximately 1 L so far. On 4LPM.     Interval History:Pt denies any Sob or CP    Review of Systems   All other systems reviewed and are negative.    Objective:     Vital Signs (Most Recent):  Temp: 98.7 °F (37.1 °C) (10/15/18 1523)  Pulse: 86 (10/15/18 1523)  Resp: 18 (10/15/18 1523)  BP: (!) 104/55 (10/15/18 1523)  SpO2: (!) 92 % (10/15/18 1523) Vital Signs (24h Range):  Temp:  [97.5 °F (36.4 °C)-100.2 °F (37.9 °C)] 98.7 °F (37.1 °C)  Pulse:  [70-88] 86  Resp:  [14-30] 18  SpO2:  [92 %-100 %] 92 %  BP: ()/(50-59) 104/55     Weight: 58.8 kg (129 lb 10.1 oz)  Body mass index is 22.25 kg/m².    Intake/Output Summary (Last 24 hours) at 10/15/2018 1746  Last data filed at 10/15/2018 0800  Gross per 24 hour   Intake 580 ml   Output 850 ml   Net -270 ml      Physical Exam   Constitutional: She is oriented to person, place, and time. She appears well-developed and well-nourished.   HENT:   Head: Normocephalic and atraumatic.   Nose: Nose normal.   Eyes: Conjunctivae and EOM are normal.   Neck: Normal range of motion. Neck supple.   Cardiovascular: Normal rate, regular rhythm, normal heart sounds and intact distal pulses.   Pulmonary/Chest: Effort normal and breath sounds normal. She has no wheezes.   Decreased breath sounds with faint crackles   Abdominal: Soft. Bowel sounds are normal. She exhibits no mass. There is no tenderness. There is no rebound and no guarding.   Musculoskeletal: Normal range of motion. She exhibits no edema or tenderness.   Neurological: She is alert and oriented to person, place, and time. No cranial nerve deficit.   Skin: Skin is warm. No rash noted.   Psychiatric: She has a normal mood and affect. Her behavior is normal. Thought content normal.   Nursing note and vitals reviewed.      Significant Labs:   CBC:   Recent Labs   Lab  10/14/18   1003  10/15/18   0452   WBC  9.69  6.50   HGB  11.6*  10.6*   HCT  36.0*  34.0*   PLT  148*  136*     CMP:   Recent Labs   Lab  10/14/18   1003   10/15/18   0452   NA  136  135*   K  4.7  4.0   CL  95  93*   CO2  30*  36*   GLU  134*  96   BUN  18  16   CREATININE  0.8  0.7   CALCIUM  9.1  8.4*   PROT  6.9  6.0   ALBUMIN  3.5  3.0*   BILITOT  0.7  0.4   ALKPHOS  65  55   AST  25  20   ALT  12  11   ANIONGAP  11  6*   EGFRNONAA  >60  >60       Significant Imaging: I have reviewed all pertinent imaging results/findings within the past 24 hours.   Imaging Results          X-Ray Chest AP Portable (Final result)  Result time 10/14/18 10:35:17    Final result by Stu Amador MD (10/14/18 10:35:17)                 Impression:      Findings suggest some developing consolidation and atelectasis in the left lower lobe.      Electronically signed by: Nelson Amador MD  Date:    10/14/2018  Time:    10:35             Narrative:    EXAMINATION:  XR CHEST AP PORTABLE    CLINICAL HISTORY:  CHF;    TECHNIQUE:  Single frontal view of the chest was performed.    COMPARISON:  07/10/2018    FINDINGS:  There is mild cardiomegaly.  There is a chronic pattern of coarsened reticulonodular interstitial markings within the lungs ting chronic changes of interstitial disease.  There is more localized patchy area of consolidation in the lung base suggesting developing consolidation or atelectasis.                                  Assessment/Plan:      * Congestive heart failure    Acute on chronic diastolic CHF  -IV lasix 20mg BID  -cardiac diet with 1500 milliliter(s) fluid restriction  -daily weights  -strict I &O  -No BB due to hypotension  - No ACE due to hypotension  - Cardiology on board          Acute on chronic respiratory failure with hypoxia    -cont supplemental oxygen and diurese  -no aggressive interventions        Chronic obstructive pulmonary disease    Duonebs q 8 hours          CML (chronic myelocytic leukemia)    Outpatient management          Critical aortic valve stenosis    -Cardiology consulted: Dr. Jeffers spoke with son and patient  -DNR  -Patient refused  surgery, not a surgical candidate            VTE Risk Mitigation (From admission, onward)        Ordered     IP VTE HIGH RISK PATIENT  Once      10/14/18 1219     Place RADHA hose  Until discontinued      10/14/18 1219     Place sequential compression device  Until discontinued      10/14/18 1219              Светлана Fleming MD  Department of Hospital Medicine   [facility]

## 2018-10-15 NOTE — ASSESSMENT & PLAN NOTE
-Decompensated CHF in setting of severe valvular disease/critical AS  -Continue IV Lasix  -Discussed overall poor prognosis; patient and family not ready to transition to hospice at present time

## 2018-10-15 NOTE — ASSESSMENT & PLAN NOTE
-Elevation secondary to demand ischemia from respiratory failure, hypoxia, and fluid overload  -Continue same mgmt  -Allergic to ASA
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
## 2018-10-15 NOTE — ASSESSMENT & PLAN NOTE
Acute on chronic diastolic CHF  -IV lasix 20mg BID  -cardiac diet with 1500 milliliter(s) fluid restriction  -daily weights  -strict I &O  -No BB due to hypotension  - No ACE due to hypotension  - Cardiology on board

## 2018-10-15 NOTE — ASSESSMENT & PLAN NOTE
-Presents with symptomatic severe AS/decompensated diastolic CHF  -Wants conservative mgmt; refused TAVR  -Continue IV diuresis  -Poor prognosis

## 2018-10-15 NOTE — SUBJECTIVE & OBJECTIVE
Interval History:Pt denies any Sob or CP    Review of Systems   All other systems reviewed and are negative.    Objective:     Vital Signs (Most Recent):  Temp: 98.7 °F (37.1 °C) (10/15/18 1523)  Pulse: 86 (10/15/18 1523)  Resp: 18 (10/15/18 1523)  BP: (!) 104/55 (10/15/18 1523)  SpO2: (!) 92 % (10/15/18 1523) Vital Signs (24h Range):  Temp:  [97.5 °F (36.4 °C)-100.2 °F (37.9 °C)] 98.7 °F (37.1 °C)  Pulse:  [70-88] 86  Resp:  [14-30] 18  SpO2:  [92 %-100 %] 92 %  BP: ()/(50-59) 104/55     Weight: 58.8 kg (129 lb 10.1 oz)  Body mass index is 22.25 kg/m².    Intake/Output Summary (Last 24 hours) at 10/15/2018 1746  Last data filed at 10/15/2018 0800  Gross per 24 hour   Intake 580 ml   Output 850 ml   Net -270 ml      Physical Exam   Constitutional: She is oriented to person, place, and time. She appears well-developed and well-nourished.   HENT:   Head: Normocephalic and atraumatic.   Nose: Nose normal.   Eyes: Conjunctivae and EOM are normal.   Neck: Normal range of motion. Neck supple.   Cardiovascular: Normal rate, regular rhythm, normal heart sounds and intact distal pulses.   Pulmonary/Chest: Effort normal and breath sounds normal. She has no wheezes.   Decreased breath sounds with faint crackles   Abdominal: Soft. Bowel sounds are normal. She exhibits no mass. There is no tenderness. There is no rebound and no guarding.   Musculoskeletal: Normal range of motion. She exhibits no edema or tenderness.   Neurological: She is alert and oriented to person, place, and time. No cranial nerve deficit.   Skin: Skin is warm. No rash noted.   Psychiatric: She has a normal mood and affect. Her behavior is normal. Thought content normal.   Nursing note and vitals reviewed.      Significant Labs:   CBC:   Recent Labs   Lab  10/14/18   1003  10/15/18   0452   WBC  9.69  6.50   HGB  11.6*  10.6*   HCT  36.0*  34.0*   PLT  148*  136*     CMP:   Recent Labs   Lab  10/14/18   1003  10/15/18   0452   NA  136  135*   K  4.7   4.0   CL  95  93*   CO2  30*  36*   GLU  134*  96   BUN  18  16   CREATININE  0.8  0.7   CALCIUM  9.1  8.4*   PROT  6.9  6.0   ALBUMIN  3.5  3.0*   BILITOT  0.7  0.4   ALKPHOS  65  55   AST  25  20   ALT  12  11   ANIONGAP  11  6*   EGFRNONAA  >60  >60       Significant Imaging: I have reviewed all pertinent imaging results/findings within the past 24 hours.   Imaging Results          X-Ray Chest AP Portable (Final result)  Result time 10/14/18 10:35:17    Final result by Stu Amador MD (10/14/18 10:35:17)                 Impression:      Findings suggest some developing consolidation and atelectasis in the left lower lobe.      Electronically signed by: Nelson Amador MD  Date:    10/14/2018  Time:    10:35             Narrative:    EXAMINATION:  XR CHEST AP PORTABLE    CLINICAL HISTORY:  CHF;    TECHNIQUE:  Single frontal view of the chest was performed.    COMPARISON:  07/10/2018    FINDINGS:  There is mild cardiomegaly.  There is a chronic pattern of coarsened reticulonodular interstitial markings within the lungs ting chronic changes of interstitial disease.  There is more localized patchy area of consolidation in the lung base suggesting developing consolidation or atelectasis.

## 2018-10-15 NOTE — HOSPITAL COURSE
10/15/18-Patient seen and examined today, resting in bed. States SOB has improved. No chest pain. Labs reviewed.   10/16/18 - has SOB, stable. No chest pain. Alert. C/o left flank pain

## 2018-10-15 NOTE — PROGRESS NOTES
Pharmacist Renal Dose Adjustment Note    Carmela Maria is a 89 y.o. female being treated with the medication famotidine.     Patient Data:  Vital Signs (Most Recent):  Temp: 98.7 °F (37.1 °C) (10/15/18 1523)  Pulse: 86 (10/15/18 1523)  Resp: 18 (10/15/18 1523)  BP: (!) 104/55 (10/15/18 1523)  SpO2: (!) 92 % (10/15/18 1523)   Vital Signs (72h Range):  Temp:  [97.5 °F (36.4 °C)-100.2 °F (37.9 °C)]   Pulse:  [62-90]   Resp:  [13-31]   BP: ()/(48-78)   SpO2:  [68 %-100 %]      Recent Labs   Lab  10/14/18   1003  10/15/18   0452   CREATININE  0.8  0.7     Serum creatinine: 0.7 mg/dL 10/15/18 0452  Estimated creatinine clearance: 44.3 mL/min    Medication dose will be changed to famotidine 20 mg PO Q24H.     Pharmacist's Name: Daylin Rucker  Pharmacist's Extension: [phone]

## 2018-10-15 NOTE — SUBJECTIVE & OBJECTIVE
Review of Systems   Constitution: Positive for weakness and malaise/fatigue.   HENT: Negative.    Cardiovascular: Positive for dyspnea on exertion.   Respiratory: Negative.    Endocrine: Negative.    Hematologic/Lymphatic: Negative.    Skin: Negative.    Musculoskeletal: Negative.    Gastrointestinal: Negative.    Genitourinary: Negative.    Psychiatric/Behavioral: Negative.    Allergic/Immunologic: Negative.      Objective:     Vital Signs (Most Recent):  Temp: 98.9 °F (37.2 °C) (10/15/18 0834)  Pulse: 73 (10/15/18 0834)  Resp: 20 (10/15/18 0834)  BP: (!) 109/54 (10/15/18 0834)  SpO2: 97 % (10/15/18 0834) Vital Signs (24h Range):  Temp:  [97.5 °F (36.4 °C)-100.2 °F (37.9 °C)] 98.9 °F (37.2 °C)  Pulse:  [62-88] 73  Resp:  [13-31] 20  SpO2:  [86 %-100 %] 97 %  BP: ()/(48-78) 109/54     Weight: 58.8 kg (129 lb 10.1 oz)  Body mass index is 22.25 kg/m².     SpO2: 97 %  O2 Device (Oxygen Therapy): nasal cannula      Intake/Output Summary (Last 24 hours) at 10/15/2018 1106  Last data filed at 10/15/2018 0451  Gross per 24 hour   Intake 390 ml   Output 850 ml   Net -460 ml       Lines/Drains/Airways     Drain                 Urethral Catheter 10/14/18 1230 Latex 16 Fr. less than 1 day          Peripheral Intravenous Line                 Peripheral IV - Single Lumen 10/14/18 1001 Right Antecubital 1 day                Physical Exam   Constitutional: She is oriented to person, place, and time. No distress.   Elderly, frail     HENT:   Head: Normocephalic and atraumatic.   Eyes: Pupils are equal, round, and reactive to light. Right eye exhibits no discharge. Left eye exhibits no discharge.   Neck: Neck supple. JVD present.   Cardiovascular: S1 normal.   Murmur heard.   Harsh midsystolic murmur is present at the upper right sternal border radiating to the neck.  Diminished S2   Pulmonary/Chest: Effort normal. She has rales.   Abdominal: Soft. She exhibits no distension. There is no tenderness. There is no rebound.    Musculoskeletal: Edema: trace BLE.   Neurological: She is oriented to person, place, and time.   Skin: Skin is warm and dry. She is not diaphoretic.   Psychiatric: She has a normal mood and affect. Her behavior is normal. Thought content normal.   Nursing note and vitals reviewed.      Significant Labs:   CMP   Recent Labs   Lab  10/14/18   1003  10/15/18   0452   NA  136  135*   K  4.7  4.0   CL  95  93*   CO2  30*  36*   GLU  134*  96   BUN  18  16   CREATININE  0.8  0.7   CALCIUM  9.1  8.4*   PROT  6.9  6.0   ALBUMIN  3.5  3.0*   BILITOT  0.7  0.4   ALKPHOS  65  55   AST  25  20   ALT  12  11   ANIONGAP  11  6*   ESTGFRAFRICA  >60  >60   EGFRNONAA  >60  >60   , CBC   Recent Labs   Lab  10/14/18   1003  10/15/18   0452   WBC  9.69  6.50   HGB  11.6*  10.6*   HCT  36.0*  34.0*   PLT  148*  136*   , Troponin   Recent Labs   Lab  10/14/18   1003  10/14/18   1255  10/14/18   1928   TROPONINI  0.272*  0.394*  0.380*    and All pertinent lab results from the last 24 hours have been reviewed.    Significant Imaging: Echocardiogram:   2D echo with color flow doppler:   Results for orders placed or performed in visit on 05/09/18   2D echo with color flow doppler   Result Value Ref Range    EF 55 55 - 65    Mitral Valve Regurgitation TRIVIAL     Diastolic Dysfunction Yes (A)     Aortic Valve Stenosis SEVERE (A)     Est. PA Systolic Pressure 42.25 (A)     Tricuspid Valve Regurgitation TRIVIAL    , EKG: Reviewed and X-Ray: CXR: X-Ray Chest 1 View (CXR): No results found for this visit on 10/14/18. and X-Ray Chest PA and Lateral (CXR): No results found for this visit on 10/14/18.

## 2018-10-16 VITALS
HEIGHT: 64 IN | DIASTOLIC BLOOD PRESSURE: 53 MMHG | BODY MASS INDEX: 22.43 KG/M2 | RESPIRATION RATE: 18 BRPM | WEIGHT: 131.38 LBS | SYSTOLIC BLOOD PRESSURE: 109 MMHG | OXYGEN SATURATION: 95 % | HEART RATE: 88 BPM | TEMPERATURE: 99 F

## 2018-10-16 LAB
ALBUMIN SERPL BCP-MCNC: 3 G/DL
ALP SERPL-CCNC: 57 U/L
ALT SERPL W/O P-5'-P-CCNC: 14 U/L
ANION GAP SERPL CALC-SCNC: 6 MMOL/L
AORTIC VALVE STENOSIS: ABNORMAL
AST SERPL-CCNC: 19 U/L
BASOPHILS # BLD AUTO: 0.02 K/UL
BASOPHILS NFR BLD: 0.3 %
BILIRUB SERPL-MCNC: 0.4 MG/DL
BUN SERPL-MCNC: 22 MG/DL
CALCIUM SERPL-MCNC: 8.5 MG/DL
CHLORIDE SERPL-SCNC: 93 MMOL/L
CO2 SERPL-SCNC: 37 MMOL/L
CREAT SERPL-MCNC: 0.8 MG/DL
DIASTOLIC DYSFUNCTION: NO
DIFFERENTIAL METHOD: ABNORMAL
EOSINOPHIL # BLD AUTO: 0.2 K/UL
EOSINOPHIL NFR BLD: 2.5 %
ERYTHROCYTE [DISTWIDTH] IN BLOOD BY AUTOMATED COUNT: 15.4 %
EST. GFR  (AFRICAN AMERICAN): >60 ML/MIN/1.73 M^2
EST. GFR  (NON AFRICAN AMERICAN): >60 ML/MIN/1.73 M^2
GLUCOSE SERPL-MCNC: 93 MG/DL
HCT VFR BLD AUTO: 33.2 %
HGB BLD-MCNC: 10.4 G/DL
LYMPHOCYTES # BLD AUTO: 1.7 K/UL
LYMPHOCYTES NFR BLD: 22.5 %
MCH RBC QN AUTO: 31.4 PG
MCHC RBC AUTO-ENTMCNC: 31.3 G/DL
MCV RBC AUTO: 100 FL
MONOCYTES # BLD AUTO: 0.9 K/UL
MONOCYTES NFR BLD: 11.9 %
NEUTROPHILS # BLD AUTO: 4.7 K/UL
NEUTROPHILS NFR BLD: 62.8 %
PLATELET # BLD AUTO: 143 K/UL
PMV BLD AUTO: 13.4 FL
POTASSIUM SERPL-SCNC: 4 MMOL/L
PROT SERPL-MCNC: 6.1 G/DL
RBC # BLD AUTO: 3.31 M/UL
RETIRED EF AND QEF - SEE NOTES: 55 (ref 55–65)
SODIUM SERPL-SCNC: 136 MMOL/L
WBC # BLD AUTO: 7.48 K/UL

## 2018-10-16 PROCEDURE — 85025 COMPLETE CBC W/AUTO DIFF WBC: CPT

## 2018-10-16 PROCEDURE — 25000003 PHARM REV CODE 250: Performed by: FAMILY MEDICINE

## 2018-10-16 PROCEDURE — 25000003 PHARM REV CODE 250: Performed by: EMERGENCY MEDICINE

## 2018-10-16 PROCEDURE — 80053 COMPREHEN METABOLIC PANEL: CPT

## 2018-10-16 PROCEDURE — 36415 COLL VENOUS BLD VENIPUNCTURE: CPT

## 2018-10-16 PROCEDURE — 94761 N-INVAS EAR/PLS OXIMETRY MLT: CPT

## 2018-10-16 PROCEDURE — 63600175 PHARM REV CODE 636 W HCPCS: Performed by: FAMILY MEDICINE

## 2018-10-16 PROCEDURE — 99232 SBSQ HOSP IP/OBS MODERATE 35: CPT | Mod: ,,, | Performed by: INTERNAL MEDICINE

## 2018-10-16 PROCEDURE — 94640 AIRWAY INHALATION TREATMENT: CPT

## 2018-10-16 PROCEDURE — 25000242 PHARM REV CODE 250 ALT 637 W/ HCPCS: Performed by: FAMILY MEDICINE

## 2018-10-16 PROCEDURE — 27000221 HC OXYGEN, UP TO 24 HOURS

## 2018-10-16 RX ORDER — IPRATROPIUM BROMIDE AND ALBUTEROL SULFATE 2.5; .5 MG/3ML; MG/3ML
3 SOLUTION RESPIRATORY (INHALATION)
Status: DISCONTINUED | OUTPATIENT
Start: 2018-10-16 | End: 2018-10-16 | Stop reason: HOSPADM

## 2018-10-16 RX ORDER — BUDESONIDE 0.5 MG/2ML
0.5 INHALANT ORAL EVERY 12 HOURS
Status: DISCONTINUED | OUTPATIENT
Start: 2018-10-16 | End: 2018-10-16 | Stop reason: HOSPADM

## 2018-10-16 RX ORDER — FUROSEMIDE 40 MG/1
40 TABLET ORAL 2 TIMES DAILY
Qty: 60 TABLET | Refills: 11 | Status: SHIPPED | OUTPATIENT
Start: 2018-10-16 | End: 2019-10-16

## 2018-10-16 RX ADMIN — FUROSEMIDE 20 MG: 10 INJECTION, SOLUTION INTRAMUSCULAR; INTRAVENOUS at 09:10

## 2018-10-16 RX ADMIN — IPRATROPIUM BROMIDE AND ALBUTEROL SULFATE 3 ML: .5; 3 SOLUTION RESPIRATORY (INHALATION) at 01:10

## 2018-10-16 RX ADMIN — FAMOTIDINE 20 MG: 20 TABLET ORAL at 09:10

## 2018-10-16 RX ADMIN — ACETAMINOPHEN 650 MG: 325 TABLET ORAL at 12:10

## 2018-10-16 RX ADMIN — MOXIFLOXACIN HYDROCHLORIDE 400 MG: 400 TABLET, FILM COATED ORAL at 09:10

## 2018-10-16 NOTE — CONSULTS
Met with patient's family to discuss [facility]. Family agrees that patient needs care beyond their capability. Son is comfortable having [facility] , [place], come speak to family about services available through their facility. Patient's son is still not comfortable with placing patient in hospice, but would like to hear what services are available with [facility]. Son signed patient preference form for [facility] to come discuss options with family and son will make decision moving forward at that time. Son was concerned that he would not be able to stay with patient at hospice facility if patient is placed at [facility]. MSW confirmed with [facility] that son would be able to stay in patient's room overnight and that the facility does not limit visiting hours.    MSW contacted [facility] community liaison, [place] to come speak to family in patient's room about options available. [place] confirmed that she would be at hospital within 20 minutes to speak with family. MSW notified patient's family.    Sent referral for [facility] through [facility].

## 2018-10-16 NOTE — SUBJECTIVE & OBJECTIVE
ROS  Objective:     Vital Signs (Most Recent):  Temp: 97.8 °F (36.6 °C) (10/16/18 0724)  Pulse: 79 (10/16/18 0900)  Resp: 18 (10/16/18 0724)  BP: (!) 117/55 (10/16/18 0724)  SpO2: 99 % (10/16/18 0845) Vital Signs (24h Range):  Temp:  [97.8 °F (36.6 °C)-98.9 °F (37.2 °C)] 97.8 °F (36.6 °C)  Pulse:  [72-87] 79  Resp:  [15-20] 18  SpO2:  [92 %-100 %] 99 %  BP: ()/(51-56) 117/55     Weight: 59.6 kg (131 lb 6.3 oz)  Body mass index is 22.55 kg/m².     SpO2: 99 %  O2 Device (Oxygen Therapy): nasal cannula      Intake/Output Summary (Last 24 hours) at 10/16/2018 1209  Last data filed at 10/16/2018 1049  Gross per 24 hour   Intake 100 ml   Output 1725 ml   Net -1625 ml       Lines/Drains/Airways     Drain                 Urethral Catheter 10/14/18 1230 Latex 16 Fr. 1 day          Peripheral Intravenous Line                 Peripheral IV - Single Lumen 10/14/18 1001 Right Antecubital 2 days                Physical Exam   Constitutional: She is oriented to person, place, and time. No distress.   Elderly, frail     HENT:   Head: Normocephalic and atraumatic.   Eyes: Pupils are equal, round, and reactive to light. Right eye exhibits no discharge. Left eye exhibits no discharge.   Neck: Neck supple. JVD present.   Cardiovascular: S1 normal.   Murmur heard.   Harsh midsystolic murmur is present at the upper right sternal border radiating to the neck.  Diminished S2   Pulmonary/Chest: Effort normal. She has rales.   Abdominal: Soft. She exhibits no distension. There is no tenderness. There is no rebound.   Musculoskeletal: Edema: trace BLE.   Neurological: She is oriented to person, place, and time.   Skin: Skin is warm and dry. She is not diaphoretic.   Psychiatric: She has a normal mood and affect. Her behavior is normal. Thought content normal.   Nursing note and vitals reviewed.      Significant Labs:   ABG:   Recent Labs   Lab  10/14/18   1318  10/14/18   1515   PH  7.334*  7.420   PCO2  70.8*  61.6*   HCO3  37.6*   40.0*   POCSATURATED  92*  92*   BE  12  15   , Blood Culture:   Recent Labs   Lab  10/14/18   1210   LABBLOO  No Growth to date  No Growth to date   , BMP:   Recent Labs   Lab  10/15/18   0452  10/16/18   0517   GLU  96  93   NA  135*  136   K  4.0  4.0   CL  93*  93*   CO2  36*  37*   BUN  16  22   CREATININE  0.7  0.8   CALCIUM  8.4*  8.5*   , CMP   Recent Labs   Lab  10/15/18   0452  10/16/18   0517   NA  135*  136   K  4.0  4.0   CL  93*  93*   CO2  36*  37*   GLU  96  93   BUN  16  22   CREATININE  0.7  0.8   CALCIUM  8.4*  8.5*   PROT  6.0  6.1   ALBUMIN  3.0*  3.0*   BILITOT  0.4  0.4   ALKPHOS  55  57   AST  20  19   ALT  11  14   ANIONGAP  6*  6*   ESTGFRAFRICA  >60  >60   EGFRNONAA  >60  >60   , CBC   Recent Labs   Lab  10/15/18   0452  10/16/18   0517   WBC  6.50  7.48   HGB  10.6*  10.4*   HCT  34.0*  33.2*   PLT  136*  143*   , INR No results for input(s): INR, PROTIME in the last 48 hours., Lipid Panel No results for input(s): CHOL, HDL, LDLCALC, TRIG, CHOLHDL in the last 48 hours. and Troponin   Recent Labs   Lab  10/14/18   1255  10/14/18   1928   TROPONINI  0.394*  0.380*       Significant Imaging:

## 2018-10-16 NOTE — PROGRESS NOTES
"[facility] Hospital Medicine  Progress Note    Patient Name: Carmela Maria  MRN: [id number]  Patient Class: IP- Inpatient   Admission Date: 10/14/2018  Length of Stay: 2 days  Attending Physician: Светлана Fleming MD  Primary Care Provider: Aden Jones MD        Subjective:     Principal Problem:Congestive heart failure    HPI:  Carmela Maria is a 89 y.o. female patient with PMHx of diastolic CHF, critical aortic stenosis, CML, COPD, who presents to the ER for evaluation of  c/o shortness of breath. Symptoms are constant and moderate in severity. Son stated he has called EMS twice this week for sxs and he stated he took her O2 sats at home which were in the 70's. Son reports EMS gave her oxygen and sxs improved. Yesterday, she refused to come to the hospital. Son reports pt seemed more confused today. Son reports "supposedly" took her 20mg lasix this morning but is not sure. Pt denies any recent long distance traveling. Associated sxs included productive cough and leg swelling. Patient denies any fever, chills, lightheadedness, diaphoresis, CP, nausea, emesis, and all other sxs at this time. In ER, ABG's showed PO2 33 and was placed on bipap, Troponin 0.394, BNP 1788. Pt has a Living Will, but son wanted her to be a FULL CODE. Dr. Jeffers, Cardiology, was consulted and told the the son, the patient told him she didn't want life support. Son then agreed to DNR. Son was told she has critical AS and is terminal. After 60 mg IV Lasix, she was able to come off Bipap and on NC. Son, Ede Payne, is surrogate decision maker. She is admitted with acute on chronic diastolic heart failure with critical aortic stenosis to ICU on bipap.            Hospital Course:  Pt admitted for pulm edema. Lasix was on hold overnight due to hypotension. Resumed Lasix IV BID and will monitor closely. Discussed hospice with son who is not ready yet given dad and uncle had passed away recently this year. Pt diuresed " approximately 1 L so far. On 4LPM.     10/16:Pt's oxygen weaned down to 2.5L.  Pt diuresed 1.2L total so far. Spoke to family with cards about pt's poor prognosis. Hospice is rec'd. Family agrees to inpt hospice since family unable to take care of pt at home.    Interval History:Pt denies any Sob or CP    Review of Systems   All other systems reviewed and are negative.    Objective:     Vital Signs (Most Recent):  Temp: 97.8 °F (36.6 °C) (10/16/18 0724)  Pulse: 83 (10/16/18 1310)  Resp: 18 (10/16/18 1310)  BP: (!) 117/55 (10/16/18 0724)  SpO2: (!) 93 % (10/16/18 1310) Vital Signs (24h Range):  Temp:  [97.8 °F (36.6 °C)-98.9 °F (37.2 °C)] 97.8 °F (36.6 °C)  Pulse:  [72-87] 83  Resp:  [15-20] 18  SpO2:  [92 %-100 %] 93 %  BP: ()/(51-56) 117/55     Weight: 59.6 kg (131 lb 6.3 oz)  Body mass index is 22.55 kg/m².    Intake/Output Summary (Last 24 hours) at 10/16/2018 1516  Last data filed at 10/16/2018 1049  Gross per 24 hour   Intake 100 ml   Output 1725 ml   Net -1625 ml      Physical Exam   Constitutional: She is oriented to person, place, and time. She appears well-developed and well-nourished.   HENT:   Head: Normocephalic and atraumatic.   Nose: Nose normal.   Eyes: Conjunctivae and EOM are normal.   Neck: Normal range of motion. Neck supple.   Cardiovascular: Normal rate, regular rhythm, normal heart sounds and intact distal pulses.   Pulmonary/Chest: Effort normal and breath sounds normal. She has no wheezes.   Decreased breath sounds with faint crackles   Abdominal: Soft. Bowel sounds are normal. She exhibits no mass. There is no tenderness. There is no rebound and no guarding.   Musculoskeletal: Normal range of motion. She exhibits no edema or tenderness.   Neurological: She is alert and oriented to person, place, and time. No cranial nerve deficit.   Skin: Skin is warm. No rash noted.   Psychiatric: She has a normal mood and affect. Her behavior is normal. Thought content normal.   Nursing note and  vitals reviewed.      Significant Labs:   CBC:   Recent Labs   Lab  10/15/18   0452  10/16/18   0517   WBC  6.50  7.48   HGB  10.6*  10.4*   HCT  34.0*  33.2*   PLT  136*  143*     CMP:   Recent Labs   Lab  10/15/18   0452  10/16/18   0517   NA  135*  136   K  4.0  4.0   CL  93*  93*   CO2  36*  37*   GLU  96  93   BUN  16  22   CREATININE  0.7  0.8   CALCIUM  8.4*  8.5*   PROT  6.0  6.1   ALBUMIN  3.0*  3.0*   BILITOT  0.4  0.4   ALKPHOS  55  57   AST  20  19   ALT  11  14   ANIONGAP  6*  6*   EGFRNONAA  >60  >60       Significant Imaging: I have reviewed all pertinent imaging results/findings within the past 24 hours.   Imaging Results          X-Ray Chest AP Portable (Final result)  Result time 10/14/18 10:35:17    Final result by Stu Amador MD (10/14/18 10:35:17)                 Impression:      Findings suggest some developing consolidation and atelectasis in the left lower lobe.      Electronically signed by: Nelson Amador MD  Date:    10/14/2018  Time:    10:35             Narrative:    EXAMINATION:  XR CHEST AP PORTABLE    CLINICAL HISTORY:  CHF;    TECHNIQUE:  Single frontal view of the chest was performed.    COMPARISON:  07/10/2018    FINDINGS:  There is mild cardiomegaly.  There is a chronic pattern of coarsened reticulonodular interstitial markings within the lungs ting chronic changes of interstitial disease.  There is more localized patchy area of consolidation in the lung base suggesting developing consolidation or atelectasis.                                  Assessment/Plan:      * Congestive heart failure    Acute on chronic diastolic CHF  -IV lasix 20mg BID  -cardiac diet with 1500 milliliter(s) fluid restriction  -daily weights  -strict I &O  -No BB due to hypotension  - No ACE due to hypotension  - Cardiology on board          Acute on chronic respiratory failure with hypoxia    -cont supplemental oxygen and diurese  -no aggressive interventions        Chronic obstructive pulmonary  disease    Duonebs q 8 hours  Resume budesonide        CML (chronic myelocytic leukemia)    Outpatient management          Critical aortic valve stenosis    -Cardiology consulted: Dr. Jeffers spoke with son and patient  -DNR  -Patient refused surgery, not a surgical candidate  -comfort care  -inpt hospice consult            VTE Risk Mitigation (From admission, onward)        Ordered     IP VTE HIGH RISK PATIENT  Once      10/14/18 1219     Place RADHA hose  Until discontinued      10/14/18 1219     Place sequential compression device  Until discontinued      10/14/18 1219              Светлана Fleming MD  Department of Hospital Medicine   [facility]

## 2018-10-16 NOTE — PLAN OF CARE
10/16/18 0935   Medicare Message   Important Message from Medicare regarding Discharge Appeal Rights Given to patient/caregiver;Explained to patient/caregiver;Signed/date by patient/caregiver   Date IMM was signed 10/16/18   Time IMM was signed 0935

## 2018-10-16 NOTE — PLAN OF CARE
, Tyler Joy, met with patient and family. Consents obtained for inpatient hospice at [facility]. Tyler will contact with bed availability. Update to Dr. Fleming. Patient will need oxygen for transport. Per nursing, patient able to sit up. Will not qualify for ambulance based on medical necessity. Tiffanie Cisneros CM supervisor to obtain PA for ambulance versus cost to family.     Per Tyler, [facility] will provide transportation with oxygen. Patient has been accepted by [facility]. Updated nurse Shayy to fax d/c orders and AVS to [facility] at [phone] and give report to Valerie at [facility] at [phone].

## 2018-10-16 NOTE — DISCHARGE SUMMARY
"[facility] Hospital Medicine  Discharge Summary      Patient Name: Carmela Maria  MRN: [id number]  Admission Date: 10/14/2018  Hospital Length of Stay: 2 days  Discharge Date and Time:  10/16/2018 5:06 PM  Attending Physician: Светлана Fleming MD   Discharging Provider: Светлана Fleming MD  Primary Care Provider: Aden Jones MD      HPI:   Carmela Maria is a 89 y.o. female patient with PMHx of diastolic CHF, critical aortic stenosis, CML, COPD, who presents to the ER for evaluation of  c/o shortness of breath. Symptoms are constant and moderate in severity. Son stated he has called EMS twice this week for sxs and he stated he took her O2 sats at home which were in the 70's. Son reports EMS gave her oxygen and sxs improved. Yesterday, she refused to come to the hospital. Son reports pt seemed more confused today. Son reports "supposedly" took her 20mg lasix this morning but is not sure. Pt denies any recent long distance traveling. Associated sxs included productive cough and leg swelling. Patient denies any fever, chills, lightheadedness, diaphoresis, CP, nausea, emesis, and all other sxs at this time. In ER, ABG's showed PO2 33 and was placed on bipap, Troponin 0.394, BNP 1788. Pt has a Living Will, but son wanted her to be a FULL CODE. Dr. Jeffers, Cardiology, was consulted and told the the son, the patient told him she didn't want life support. Son then agreed to DNR. Son was told she has critical AS and is terminal. After 60 mg IV Lasix, she was able to come off Bipap and on NC. Son, Ede Payne, is surrogate decision maker. She is admitted with acute on chronic diastolic heart failure with critical aortic stenosis to ICU on bipap.            * No surgery found *      Hospital Course:   Pt admitted for pulm edema. Lasix was on hold overnight due to hypotension. Resumed Lasix IV BID and will monitor closely. Discussed hospice with son who is not ready yet given dad and uncle had passed " away recently this year. Pt diuresed approximately 1 L so far. On 4LPM.     10/16:Pt's oxygen weaned down to 2.5L.  Pt diuresed 1.2L total so far. Spoke to family with cards about pt's poor prognosis. Hospice is rec'd. Family agrees to inpt hospice since family unable to take care of pt at home. Pt is accepted to [facility]. Seen and examined. Deemed suitable for d/c.      Consults:   Consults (From admission, onward)        Status Ordering Provider     Inpatient consult to Cardiology  Once     Provider:  Machelle Jeffers MD    Completed RACHNA BERRIOS     Inpatient consult to Hospitalist  Once     Provider:  (Not yet assigned)    Acknowledged JANIYA DEE     Inpatient consult to Social Work  Once     Provider:  (Not yet assigned)    Completed IVANA BISWAS          No new Assessment & Plan notes have been filed under this hospital service since the last note was generated.  Service: Hospital Medicine    Final Active Diagnoses:    Diagnosis Date Noted POA    PRINCIPAL PROBLEM:  Congestive heart failure [I50.9] 10/14/2018 Yes    Acute on chronic respiratory failure with hypoxia [J96.21] 10/14/2018 Yes    Elevated troponin [R74.8] 07/10/2018 Yes    Chronic obstructive pulmonary disease [J44.9] 08/12/2013 Yes    CML (chronic myelocytic leukemia) [C92.10] 06/14/2013 Yes    Critical aortic valve stenosis [I35.0] 06/09/2013 Yes      Problems Resolved During this Admission:       Discharged Condition: stable    Disposition: Hospice/Medical Facility    Follow Up:    Patient Instructions:      Diet Adult Regular     Activity as tolerated       Significant Diagnostic Studies: Labs:   CMP   Recent Labs   Lab  10/15/18   0452  10/16/18   0517   NA  135*  136   K  4.0  4.0   CL  93*  93*   CO2  36*  37*   GLU  96  93   BUN  16  22   CREATININE  0.7  0.8   CALCIUM  8.4*  8.5*   PROT  6.0  6.1   ALBUMIN  3.0*  3.0*   BILITOT  0.4  0.4   ALKPHOS  55  57   AST  20  19   ALT  11  14   ANIONGAP  6*  6*    ESTGFRAFRICA  >60  >60   EGFRNONAA  >60  >60    and CBC   Recent Labs   Lab  10/15/18   0452  10/16/18   0517   WBC  6.50  7.48   HGB  10.6*  10.4*   HCT  34.0*  33.2*   PLT  136*  143*       Pending Diagnostic Studies:     None         Medications:  Reconciled Home Medications:      Medication List      CHANGE how you take these medications    furosemide 40 MG tablet  Commonly known as:  LASIX  Take 1 tablet (40 mg total) by mouth 2 (two) times daily.  What changed:    · medication strength  · how much to take        CONTINUE taking these medications    albuterol 90 mcg/actuation inhaler  Commonly known as:  PROVENTIL/VENTOLIN HFA  Inhale 2 puffs into the lungs every 6 (six) hours.     albuterol-ipratropium 2.5 mg-0.5 mg/3 mL nebulizer solution  Commonly known as:  DUO-NEB  USE 1 VIAL VIA NEBULIZER EVERY 6 HOURS WHILE AWAKE     budesonide 0.5 mg/2 mL nebulizer solution  Commonly known as:  PULMICORT  Take 2 mLs (0.5 mg total) by nebulization 2 (two) times daily.     dasatinib 50 MG tablet  Commonly known as:  SPRYCEL  Take 1 tablet (50 mg total) by mouth every other day.     melatonin 3 mg Tbdl  Take by mouth. May be 5 mg, not sure     polyethylene glycol 17 gram Pwpk  Commonly known as:  GLYCOLAX  Take 17 g by mouth once daily. Hold for diarrhea     RANEXA 500 MG Tb12  Generic drug:  ranolazine  TAKE 1 TABLET TWICE A DAY     ranitidine 150 MG tablet  Commonly known as:  ZANTAC  Take 150 mg by mouth 2 (two) times daily.     senna-docusate 8.6-50 mg 8.6-50 mg per tablet  Commonly known as:  PERICOLACE  Take 2 tablets by mouth 2 (two) times daily as needed for Constipation.     simethicone 125 MG chewable tablet  Commonly known as:  MYLICON  Take 125 mg by mouth every 6 (six) hours as needed for Flatulence. Not sure of dose     VITAMIN D3 2,000 unit Cap  Generic drug:  cholecalciferol (vitamin D3)  Take 1 capsule by mouth once daily.            Indwelling Lines/Drains at time of discharge:   Lines/Drains/Airways      Drain                 Urethral Catheter 10/14/18 1230 Latex 16 Fr. 2 days                Time spent on the discharge of patient: >30 minutes  Patient was seen and examined on the date of discharge and determined to be suitable for discharge.         Светлана Fleming MD  Department of Hospital Medicine  [facility]

## 2018-10-16 NOTE — SUBJECTIVE & OBJECTIVE
Interval History:Pt denies any Sob or CP    Review of Systems   All other systems reviewed and are negative.    Objective:     Vital Signs (Most Recent):  Temp: 97.8 °F (36.6 °C) (10/16/18 0724)  Pulse: 83 (10/16/18 1310)  Resp: 18 (10/16/18 1310)  BP: (!) 117/55 (10/16/18 0724)  SpO2: (!) 93 % (10/16/18 1310) Vital Signs (24h Range):  Temp:  [97.8 °F (36.6 °C)-98.9 °F (37.2 °C)] 97.8 °F (36.6 °C)  Pulse:  [72-87] 83  Resp:  [15-20] 18  SpO2:  [92 %-100 %] 93 %  BP: ()/(51-56) 117/55     Weight: 59.6 kg (131 lb 6.3 oz)  Body mass index is 22.55 kg/m².    Intake/Output Summary (Last 24 hours) at 10/16/2018 1516  Last data filed at 10/16/2018 1049  Gross per 24 hour   Intake 100 ml   Output 1725 ml   Net -1625 ml      Physical Exam   Constitutional: She is oriented to person, place, and time. She appears well-developed and well-nourished.   HENT:   Head: Normocephalic and atraumatic.   Nose: Nose normal.   Eyes: Conjunctivae and EOM are normal.   Neck: Normal range of motion. Neck supple.   Cardiovascular: Normal rate, regular rhythm, normal heart sounds and intact distal pulses.   Pulmonary/Chest: Effort normal and breath sounds normal. She has no wheezes.   Decreased breath sounds with faint crackles   Abdominal: Soft. Bowel sounds are normal. She exhibits no mass. There is no tenderness. There is no rebound and no guarding.   Musculoskeletal: Normal range of motion. She exhibits no edema or tenderness.   Neurological: She is alert and oriented to person, place, and time. No cranial nerve deficit.   Skin: Skin is warm. No rash noted.   Psychiatric: She has a normal mood and affect. Her behavior is normal. Thought content normal.   Nursing note and vitals reviewed.      Significant Labs:   CBC:   Recent Labs   Lab  10/15/18   0452  10/16/18   0517   WBC  6.50  7.48   HGB  10.6*  10.4*   HCT  34.0*  33.2*   PLT  136*  143*     CMP:   Recent Labs   Lab  10/15/18   0452  10/16/18   0517   NA  135*  136   K  4.0   4.0   CL  93*  93*   CO2  36*  37*   GLU  96  93   BUN  16  22   CREATININE  0.7  0.8   CALCIUM  8.4*  8.5*   PROT  6.0  6.1   ALBUMIN  3.0*  3.0*   BILITOT  0.4  0.4   ALKPHOS  55  57   AST  20  19   ALT  11  14   ANIONGAP  6*  6*   EGFRNONAA  >60  >60       Significant Imaging: I have reviewed all pertinent imaging results/findings within the past 24 hours.   Imaging Results          X-Ray Chest AP Portable (Final result)  Result time 10/14/18 10:35:17    Final result by Stu Amador MD (10/14/18 10:35:17)                 Impression:      Findings suggest some developing consolidation and atelectasis in the left lower lobe.      Electronically signed by: Nelson Amador MD  Date:    10/14/2018  Time:    10:35             Narrative:    EXAMINATION:  XR CHEST AP PORTABLE    CLINICAL HISTORY:  CHF;    TECHNIQUE:  Single frontal view of the chest was performed.    COMPARISON:  07/10/2018    FINDINGS:  There is mild cardiomegaly.  There is a chronic pattern of coarsened reticulonodular interstitial markings within the lungs ting chronic changes of interstitial disease.  There is more localized patchy area of consolidation in the lung base suggesting developing consolidation or atelectasis.

## 2018-10-16 NOTE — ASSESSMENT & PLAN NOTE
-Cardiology consulted: Dr. Jeffers spoke with son and patient  -DNR  -Patient refused surgery, not a surgical candidate  -comfort care  -inpt hospice consult

## 2018-10-16 NOTE — PROGRESS NOTES
[facility]   Cardiology  Progress Note    Patient Name: Carmela Maria  MRN: [id number]  Admission Date: 10/14/2018  Hospital Length of Stay: 2 days  Code Status: DNR   Attending Physician: Светлана Fleming MD   Primary Care Physician: Aden Jones MD  Expected Discharge Date: 10/16/2018  Principal Problem:Congestive heart failure    Subjective:     Hospital Course:   10/15/18-Patient seen and examined today, resting in bed. States SOB has improved. No chest pain. Labs reviewed.   10/16/18 - has SOB, stable. No chest pain. Alert. C/o left flank pain      ROS  Objective:     Vital Signs (Most Recent):  Temp: 97.8 °F (36.6 °C) (10/16/18 0724)  Pulse: 79 (10/16/18 0900)  Resp: 18 (10/16/18 0724)  BP: (!) 117/55 (10/16/18 0724)  SpO2: 99 % (10/16/18 0845) Vital Signs (24h Range):  Temp:  [97.8 °F (36.6 °C)-98.9 °F (37.2 °C)] 97.8 °F (36.6 °C)  Pulse:  [72-87] 79  Resp:  [15-20] 18  SpO2:  [92 %-100 %] 99 %  BP: ()/(51-56) 117/55     Weight: 59.6 kg (131 lb 6.3 oz)  Body mass index is 22.55 kg/m².     SpO2: 99 %  O2 Device (Oxygen Therapy): nasal cannula      Intake/Output Summary (Last 24 hours) at 10/16/2018 1209  Last data filed at 10/16/2018 1049  Gross per 24 hour   Intake 100 ml   Output 1725 ml   Net -1625 ml       Lines/Drains/Airways     Drain                 Urethral Catheter 10/14/18 1230 Latex 16 Fr. 1 day          Peripheral Intravenous Line                 Peripheral IV - Single Lumen 10/14/18 1001 Right Antecubital 2 days                Physical Exam   Constitutional: She is oriented to person, place, and time. No distress.   Elderly, frail     HENT:   Head: Normocephalic and atraumatic.   Eyes: Pupils are equal, round, and reactive to light. Right eye exhibits no discharge. Left eye exhibits no discharge.   Neck: Neck supple. JVD present.   Cardiovascular: S1 normal.   Murmur heard.   Harsh midsystolic murmur is present at the upper right sternal border radiating to the  neck.  Diminished S2   Pulmonary/Chest: Effort normal. She has rales.   Abdominal: Soft. She exhibits no distension. There is no tenderness. There is no rebound.   Musculoskeletal: Edema: trace BLE.   Neurological: She is oriented to person, place, and time.   Skin: Skin is warm and dry. She is not diaphoretic.   Psychiatric: She has a normal mood and affect. Her behavior is normal. Thought content normal.   Nursing note and vitals reviewed.      Significant Labs:   ABG:   Recent Labs   Lab  10/14/18   1318  10/14/18   1515   PH  7.334*  7.420   PCO2  70.8*  61.6*   HCO3  37.6*  40.0*   POCSATURATED  92*  92*   BE  12  15   , Blood Culture:   Recent Labs   Lab  10/14/18   1210   LABBLOO  No Growth to date  No Growth to date   , BMP:   Recent Labs   Lab  10/15/18   0452  10/16/18   0517   GLU  96  93   NA  135*  136   K  4.0  4.0   CL  93*  93*   CO2  36*  37*   BUN  16  22   CREATININE  0.7  0.8   CALCIUM  8.4*  8.5*   , CMP   Recent Labs   Lab  10/15/18   0452  10/16/18   0517   NA  135*  136   K  4.0  4.0   CL  93*  93*   CO2  36*  37*   GLU  96  93   BUN  16  22   CREATININE  0.7  0.8   CALCIUM  8.4*  8.5*   PROT  6.0  6.1   ALBUMIN  3.0*  3.0*   BILITOT  0.4  0.4   ALKPHOS  55  57   AST  20  19   ALT  11  14   ANIONGAP  6*  6*   ESTGFRAFRICA  >60  >60   EGFRNONAA  >60  >60   , CBC   Recent Labs   Lab  10/15/18   0452  10/16/18   0517   WBC  6.50  7.48   HGB  10.6*  10.4*   HCT  34.0*  33.2*   PLT  136*  143*   , INR No results for input(s): INR, PROTIME in the last 48 hours., Lipid Panel No results for input(s): CHOL, HDL, LDLCALC, TRIG, CHOLHDL in the last 48 hours. and Troponin   Recent Labs   Lab  10/14/18   1255  10/14/18   1928   TROPONINI  0.394*  0.380*       Significant Imaging:      Assessment and Plan:       * Congestive heart failure    -Decompensated CHF in setting of severe valvular disease/critical AS  -Continue IV Lasix  -Discussed overall poor prognosis; patient and family not ready to  transition to hospice at present time        Acute on chronic respiratory failure with hypoxia    SECONDARY TO COPD AND DECOMPENSATED ACUTE DIASTOLIC CHF         Elevated troponin    -Elevation secondary to demand ischemia from respiratory failure, hypoxia, and fluid overload  -Continue same mgmt  -Allergic to ASA        Chronic obstructive pulmonary disease    -Mgmt as per hospital medicine        CML (chronic myelocytic leukemia)    PER HOSPITAL TEAM        Critical aortic valve stenosis    -Presents with symptomatic severe AS/decompensated diastolic CHF  -Wants conservative mgmt; refused TAVR  -Continue IV diuresis  - had family meeting with pt's son, daughter in law and hospital medicine. Will arrange CHF hospice.             VTE Risk Mitigation (From admission, onward)        Ordered     IP VTE HIGH RISK PATIENT  Once      10/14/18 1219     Place RADHA hose  Until discontinued      10/14/18 1219     Place sequential compression device  Until discontinued      10/14/18 1219          Rigo Dillon MD  Cardiology  [facility]

## 2018-10-16 NOTE — PROGRESS NOTES
Report called to Valerie at the [facility]. Advised me that they will be coming to transport the patient within the hour. AVS faxed to [phone].

## 2018-10-16 NOTE — PROGRESS NOTES
"Went over discharge instructions with patient and family.   Patient was transferred to the [facility].   Stressed importance of making and keeping all follow ups.   Patient verbalized understanding and has no questions in regards to discharge.  IV removed, catheter intact.  Telemetry box removed.  Patient wheeled out with oxygen to company transport van. No s/s of distress noted.  BP (!) 109/53   Pulse 88   Temp 98.5 °F (36.9 °C)   Resp 18   Ht 5' 4" (1.626 m)   Wt 59.6 kg (131 lb 6.3 oz)   SpO2 95%   Breastfeeding? No   BMI 22.55 kg/m²       "

## 2018-10-16 NOTE — PROGRESS NOTES
Home Oxygen Evaluation    Date Performed: 10/16/2018    1) Patient's Home O2 Sat on room air, while at rest: 83        If O2 sats on room air at rest are 88% or below, patient qualifies. No additional testing needed. Document N/A in steps 2 and 3. If 89% or above, complete steps 2.      2) Patient's O2 Sat on room air while exercising: na        If O2 sats on room air while exercising remain 89% or above patient does not qualify, no further testing needed Document N/A in step 3. If O2 sats on room air while exercising are 88% or below, continue to step 3.      3) Patient's O2 Sat while exercising on O2: N/A at N/A LPM         (Must show improvement from #2 for patients to qualify)    If O2 sats improve on oxygen, patient qualifies for portable oxygen. If not, the patient does not qualify.

## 2018-10-16 NOTE — ASSESSMENT & PLAN NOTE
-Presents with symptomatic severe AS/decompensated diastolic CHF  -Wants conservative mgmt; refused TAVR  -Continue IV diuresis  - had family meeting with pt's son, daughter in law and hospital medicine. Will arrange CHF hospice.

## 2018-10-16 NOTE — PROGRESS NOTES
Pt in bed sleeping with NC in place, O2 between 2 and 3L. She fed herself lunch without difficulty. No s/s of distress noted. Family at the bedside. Will continue to monitor.

## 2018-10-16 NOTE — PLAN OF CARE
Problem: Patient Care Overview  Goal: Plan of Care Review  Outcome: Outcome(s) achieved Date Met: 10/16/18  Patient is awake alert and oriented. Patient adequate for discharge. Family at the bedside.

## 2018-10-19 LAB
BACTERIA BLD CULT: NORMAL
BACTERIA BLD CULT: NORMAL

## 2018-10-19 NOTE — PHYSICIAN QUERY
PT Name: Carmela Maria  MR #: [id number]    Physician Query Form - Consultant Diagnosis Clarification     CDS/: Karyn Jones               Contact information:[email or web address]  This form is a permanent document in the medical record.     Query Date: October 19, 2018      By submitting this query, we are merely seeking further clarification of documentation.  Please utilize your independent clinical judgment when addressing the question(s) below.      The Medical record contains the following:   Diagnosis Supporting Clinical Information Location in Medical Record   Pneumonia Pneumonia of left lower lobe due to infectious organism     Congestive heart failure    Acute on chronic diastolic CHF   -IV lasix 40mg BID     -Chest Xray showed possible Pneumonia: nml WBC, afebrile, procalcitonin nml - No Abx at this time.      Chronic obstructive pulmonary disease    Duonebs q 8 hours      IV Vancomycin  Moxifloxacin p.o.       ED MD      H&P                      Mar 10-14 only  Mar 10-15 to 10-16           Do you agree with the Consultants diagnosis of Pneumonia?                 [   ]   Yes               [   ]   Yes, but it resolved prior to my assessment of the patient               [ x  ]   No                [   ]   Clinically undetermined               [   ]   Other/Clarification of findings: ___________________________________________

## 2019-02-04 ENCOUNTER — PATIENT MESSAGE (OUTPATIENT)
Dept: PULMONOLOGY | Facility: CLINIC | Age: 84
End: 2019-02-04
